# Patient Record
Sex: MALE | Race: WHITE | NOT HISPANIC OR LATINO
[De-identification: names, ages, dates, MRNs, and addresses within clinical notes are randomized per-mention and may not be internally consistent; named-entity substitution may affect disease eponyms.]

---

## 2017-12-12 PROBLEM — Z00.00 ENCOUNTER FOR PREVENTIVE HEALTH EXAMINATION: Status: ACTIVE | Noted: 2017-12-12

## 2017-12-18 ENCOUNTER — APPOINTMENT (OUTPATIENT)
Dept: SURGICAL ONCOLOGY | Facility: CLINIC | Age: 82
End: 2017-12-18
Payer: MEDICARE

## 2017-12-18 VITALS
HEIGHT: 63 IN | WEIGHT: 143 LBS | SYSTOLIC BLOOD PRESSURE: 148 MMHG | HEART RATE: 74 BPM | DIASTOLIC BLOOD PRESSURE: 70 MMHG | BODY MASS INDEX: 25.34 KG/M2 | TEMPERATURE: 97.8 F

## 2017-12-18 DIAGNOSIS — Z87.438 PERSONAL HISTORY OF OTHER DISEASES OF MALE GENITAL ORGANS: ICD-10-CM

## 2017-12-18 DIAGNOSIS — Z87.448 PERSONAL HISTORY OF OTHER DISEASES OF URINARY SYSTEM: ICD-10-CM

## 2017-12-18 DIAGNOSIS — N40.0 BENIGN PROSTATIC HYPERPLASIA WITHOUT LOWER URINARY TRACT SYMPMS: ICD-10-CM

## 2017-12-18 DIAGNOSIS — Z78.9 OTHER SPECIFIED HEALTH STATUS: ICD-10-CM

## 2017-12-18 DIAGNOSIS — Z99.2 DEPENDENCE ON RENAL DIALYSIS: ICD-10-CM

## 2017-12-18 PROCEDURE — 99204 OFFICE O/P NEW MOD 45 MIN: CPT

## 2018-01-03 ENCOUNTER — RESULT REVIEW (OUTPATIENT)
Age: 83
End: 2018-01-03

## 2018-02-13 ENCOUNTER — APPOINTMENT (OUTPATIENT)
Dept: NUCLEAR MEDICINE | Facility: HOSPITAL | Age: 83
End: 2018-02-13

## 2018-05-21 ENCOUNTER — FORM ENCOUNTER (OUTPATIENT)
Age: 83
End: 2018-05-21

## 2018-05-22 ENCOUNTER — APPOINTMENT (OUTPATIENT)
Dept: CT IMAGING | Facility: IMAGING CENTER | Age: 83
End: 2018-05-22
Payer: MEDICARE

## 2018-05-22 ENCOUNTER — OUTPATIENT (OUTPATIENT)
Dept: OUTPATIENT SERVICES | Facility: HOSPITAL | Age: 83
LOS: 1 days | End: 2018-05-22
Payer: MEDICARE

## 2018-05-22 DIAGNOSIS — C43.61 MALIGNANT MELANOMA OF RIGHT UPPER LIMB, INCLUDING SHOULDER: ICD-10-CM

## 2018-05-22 PROCEDURE — 71250 CT THORAX DX C-: CPT | Mod: 26

## 2018-05-22 PROCEDURE — 71250 CT THORAX DX C-: CPT

## 2018-06-07 ENCOUNTER — INPATIENT (INPATIENT)
Facility: HOSPITAL | Age: 83
LOS: 3 days | Discharge: HOME | DRG: 246 | End: 2018-06-11
Attending: THORACIC SURGERY (CARDIOTHORACIC VASCULAR SURGERY) | Admitting: THORACIC SURGERY (CARDIOTHORACIC VASCULAR SURGERY)
Payer: MEDICARE

## 2018-06-07 VITALS
SYSTOLIC BLOOD PRESSURE: 136 MMHG | WEIGHT: 140.88 LBS | OXYGEN SATURATION: 99 % | DIASTOLIC BLOOD PRESSURE: 79 MMHG | HEART RATE: 76 BPM | RESPIRATION RATE: 18 BRPM | TEMPERATURE: 98 F

## 2018-06-07 DIAGNOSIS — N18.6 END STAGE RENAL DISEASE: ICD-10-CM

## 2018-06-07 DIAGNOSIS — N30.90 CYSTITIS, UNSPECIFIED WITHOUT HEMATURIA: ICD-10-CM

## 2018-06-07 DIAGNOSIS — I25.10 ATHEROSCLEROTIC HEART DISEASE OF NATIVE CORONARY ARTERY WITHOUT ANGINA PECTORIS: ICD-10-CM

## 2018-06-07 DIAGNOSIS — I77.0 ARTERIOVENOUS FISTULA, ACQUIRED: Chronic | ICD-10-CM

## 2018-06-07 LAB
ALBUMIN SERPL ELPH-MCNC: 4 G/DL — SIGNIFICANT CHANGE UP (ref 3.3–5)
ALP SERPL-CCNC: 58 U/L — SIGNIFICANT CHANGE UP (ref 40–120)
ALT FLD-CCNC: 24 U/L — SIGNIFICANT CHANGE UP (ref 10–45)
ANION GAP SERPL CALC-SCNC: 13 MMOL/L — SIGNIFICANT CHANGE UP (ref 5–17)
APPEARANCE UR: CLEAR — SIGNIFICANT CHANGE UP
APTT BLD: 26.5 SEC — LOW (ref 27.5–37.4)
AST SERPL-CCNC: 27 U/L — SIGNIFICANT CHANGE UP (ref 10–40)
BASOPHILS # BLD AUTO: 0.1 K/UL — SIGNIFICANT CHANGE UP (ref 0–0.2)
BASOPHILS NFR BLD AUTO: 1.4 % — SIGNIFICANT CHANGE UP (ref 0–2)
BILIRUB SERPL-MCNC: 0.4 MG/DL — SIGNIFICANT CHANGE UP (ref 0.2–1.2)
BILIRUB UR-MCNC: NEGATIVE — SIGNIFICANT CHANGE UP
BLD GP AB SCN SERPL QL: NEGATIVE — SIGNIFICANT CHANGE UP
BUN SERPL-MCNC: 29 MG/DL — HIGH (ref 7–23)
CALCIUM SERPL-MCNC: 9.5 MG/DL — SIGNIFICANT CHANGE UP (ref 8.4–10.5)
CHLORIDE SERPL-SCNC: 99 MMOL/L — SIGNIFICANT CHANGE UP (ref 96–108)
CO2 SERPL-SCNC: 27 MMOL/L — SIGNIFICANT CHANGE UP (ref 22–31)
COLOR SPEC: YELLOW — SIGNIFICANT CHANGE UP
CREAT SERPL-MCNC: 5.77 MG/DL — HIGH (ref 0.5–1.3)
DIFF PNL FLD: ABNORMAL
EOSINOPHIL # BLD AUTO: 0.4 K/UL — SIGNIFICANT CHANGE UP (ref 0–0.5)
EOSINOPHIL NFR BLD AUTO: 5.2 % — SIGNIFICANT CHANGE UP (ref 0–6)
ESTIMATED AVERAGE GLUCOSE: 97 MG/DL — SIGNIFICANT CHANGE UP (ref 68–114)
GLUCOSE SERPL-MCNC: 95 MG/DL — SIGNIFICANT CHANGE UP (ref 70–99)
GLUCOSE UR QL: 100 MG/DL
HBA1C BLD-MCNC: 5 % — SIGNIFICANT CHANGE UP (ref 4–5.6)
HBA1C BLD-MCNC: 5 % — SIGNIFICANT CHANGE UP (ref 4–5.6)
HCT VFR BLD CALC: 30 % — LOW (ref 39–50)
HGB BLD-MCNC: 10.2 G/DL — LOW (ref 13–17)
INR BLD: 0.96 RATIO — SIGNIFICANT CHANGE UP (ref 0.88–1.16)
KETONES UR-MCNC: NEGATIVE — SIGNIFICANT CHANGE UP
LEUKOCYTE ESTERASE UR-ACNC: ABNORMAL
LYMPHOCYTES # BLD AUTO: 1.7 K/UL — SIGNIFICANT CHANGE UP (ref 1–3.3)
LYMPHOCYTES # BLD AUTO: 20.4 % — SIGNIFICANT CHANGE UP (ref 13–44)
MCHC RBC-ENTMCNC: 32.2 PG — SIGNIFICANT CHANGE UP (ref 27–34)
MCHC RBC-ENTMCNC: 34 GM/DL — SIGNIFICANT CHANGE UP (ref 32–36)
MCV RBC AUTO: 94.8 FL — SIGNIFICANT CHANGE UP (ref 80–100)
MONOCYTES # BLD AUTO: 0.6 K/UL — SIGNIFICANT CHANGE UP (ref 0–0.9)
MONOCYTES NFR BLD AUTO: 7.6 % — SIGNIFICANT CHANGE UP (ref 2–14)
MRSA PCR RESULT.: SIGNIFICANT CHANGE UP
NEUTROPHILS # BLD AUTO: 5.4 K/UL — SIGNIFICANT CHANGE UP (ref 1.8–7.4)
NEUTROPHILS NFR BLD AUTO: 65.4 % — SIGNIFICANT CHANGE UP (ref 43–77)
NITRITE UR-MCNC: NEGATIVE — SIGNIFICANT CHANGE UP
NT-PROBNP SERPL-SCNC: 2583 PG/ML — HIGH (ref 0–300)
PA ADP PRP-ACNC: 290 PRU — SIGNIFICANT CHANGE UP (ref 194–417)
PH UR: >9 — HIGH (ref 5–8)
PLATELET # BLD AUTO: 174 K/UL — SIGNIFICANT CHANGE UP (ref 150–400)
POTASSIUM SERPL-MCNC: 4.3 MMOL/L — SIGNIFICANT CHANGE UP (ref 3.5–5.3)
POTASSIUM SERPL-SCNC: 4.3 MMOL/L — SIGNIFICANT CHANGE UP (ref 3.5–5.3)
PROT SERPL-MCNC: 7.1 G/DL — SIGNIFICANT CHANGE UP (ref 6–8.3)
PROT UR-MCNC: 300 MG/DL
PROTHROM AB SERPL-ACNC: 10.5 SEC — SIGNIFICANT CHANGE UP (ref 9.8–12.7)
RBC # BLD: 3.17 M/UL — LOW (ref 4.2–5.8)
RBC # FLD: 13.2 % — SIGNIFICANT CHANGE UP (ref 10.3–14.5)
RH IG SCN BLD-IMP: POSITIVE — SIGNIFICANT CHANGE UP
S AUREUS DNA NOSE QL NAA+PROBE: SIGNIFICANT CHANGE UP
SODIUM SERPL-SCNC: 139 MMOL/L — SIGNIFICANT CHANGE UP (ref 135–145)
SP GR SPEC: 1.01 — SIGNIFICANT CHANGE UP (ref 1.01–1.02)
T4 FREE SERPL-MCNC: 1.4 NG/DL — SIGNIFICANT CHANGE UP (ref 0.9–1.8)
TSH SERPL-MCNC: 2.15 UIU/ML — SIGNIFICANT CHANGE UP (ref 0.27–4.2)
UROBILINOGEN FLD QL: NEGATIVE — SIGNIFICANT CHANGE UP
WBC # BLD: 8.2 K/UL — SIGNIFICANT CHANGE UP (ref 3.8–10.5)
WBC # FLD AUTO: 8.2 K/UL — SIGNIFICANT CHANGE UP (ref 3.8–10.5)

## 2018-06-07 PROCEDURE — 71045 X-RAY EXAM CHEST 1 VIEW: CPT | Mod: 26

## 2018-06-07 PROCEDURE — 99152 MOD SED SAME PHYS/QHP 5/>YRS: CPT

## 2018-06-07 PROCEDURE — 93010 ELECTROCARDIOGRAM REPORT: CPT

## 2018-06-07 PROCEDURE — 93460 R&L HRT ART/VENTRICLE ANGIO: CPT | Mod: 26

## 2018-06-07 PROCEDURE — 93306 TTE W/DOPPLER COMPLETE: CPT | Mod: 26

## 2018-06-07 PROCEDURE — 99222 1ST HOSP IP/OBS MODERATE 55: CPT

## 2018-06-07 RX ORDER — ATORVASTATIN CALCIUM 80 MG/1
40 TABLET, FILM COATED ORAL AT BEDTIME
Qty: 0 | Refills: 0 | Status: DISCONTINUED | OUTPATIENT
Start: 2018-06-07 | End: 2018-06-11

## 2018-06-07 RX ORDER — ASPIRIN/CALCIUM CARB/MAGNESIUM 324 MG
81 TABLET ORAL DAILY
Qty: 0 | Refills: 0 | Status: DISCONTINUED | OUTPATIENT
Start: 2018-06-07 | End: 2018-06-11

## 2018-06-07 RX ORDER — CEFUROXIME AXETIL 250 MG
1500 TABLET ORAL ONCE
Qty: 0 | Refills: 0 | Status: DISCONTINUED | OUTPATIENT
Start: 2018-06-07 | End: 2018-06-07

## 2018-06-07 RX ORDER — CHLORHEXIDINE GLUCONATE 213 G/1000ML
1 SOLUTION TOPICAL
Qty: 0 | Refills: 0 | Status: DISCONTINUED | OUTPATIENT
Start: 2018-06-07 | End: 2018-06-07

## 2018-06-07 RX ORDER — AMLODIPINE BESYLATE 2.5 MG/1
10 TABLET ORAL DAILY
Qty: 0 | Refills: 0 | Status: DISCONTINUED | OUTPATIENT
Start: 2018-06-07 | End: 2018-06-07

## 2018-06-07 RX ORDER — AMLODIPINE BESYLATE 2.5 MG/1
10 TABLET ORAL DAILY
Qty: 0 | Refills: 0 | Status: DISCONTINUED | OUTPATIENT
Start: 2018-06-07 | End: 2018-06-11

## 2018-06-07 RX ORDER — LATANOPROST 0.05 MG/ML
1 SOLUTION/ DROPS OPHTHALMIC; TOPICAL AT BEDTIME
Qty: 0 | Refills: 0 | Status: DISCONTINUED | OUTPATIENT
Start: 2018-06-07 | End: 2018-06-11

## 2018-06-07 RX ORDER — FINASTERIDE 5 MG/1
5 TABLET, FILM COATED ORAL DAILY
Qty: 0 | Refills: 0 | Status: DISCONTINUED | OUTPATIENT
Start: 2018-06-07 | End: 2018-06-11

## 2018-06-07 RX ORDER — HEPARIN SODIUM 5000 [USP'U]/ML
5000 INJECTION INTRAVENOUS; SUBCUTANEOUS EVERY 8 HOURS
Qty: 0 | Refills: 0 | Status: DISCONTINUED | OUTPATIENT
Start: 2018-06-07 | End: 2018-06-11

## 2018-06-07 RX ORDER — TAMSULOSIN HYDROCHLORIDE 0.4 MG/1
0.4 CAPSULE ORAL AT BEDTIME
Qty: 0 | Refills: 0 | Status: DISCONTINUED | OUTPATIENT
Start: 2018-06-07 | End: 2018-06-11

## 2018-06-07 RX ORDER — CEFTRIAXONE 500 MG/1
1 INJECTION, POWDER, FOR SOLUTION INTRAMUSCULAR; INTRAVENOUS ONCE
Qty: 0 | Refills: 0 | Status: COMPLETED | OUTPATIENT
Start: 2018-06-07 | End: 2018-06-07

## 2018-06-07 RX ORDER — CHLORHEXIDINE GLUCONATE 213 G/1000ML
15 SOLUTION TOPICAL
Qty: 0 | Refills: 0 | Status: DISCONTINUED | OUTPATIENT
Start: 2018-06-07 | End: 2018-06-07

## 2018-06-07 RX ORDER — METOPROLOL TARTRATE 50 MG
25 TABLET ORAL
Qty: 0 | Refills: 0 | Status: DISCONTINUED | OUTPATIENT
Start: 2018-06-07 | End: 2018-06-11

## 2018-06-07 RX ORDER — SODIUM CHLORIDE 9 MG/ML
3 INJECTION INTRAMUSCULAR; INTRAVENOUS; SUBCUTANEOUS EVERY 8 HOURS
Qty: 0 | Refills: 0 | Status: DISCONTINUED | OUTPATIENT
Start: 2018-06-07 | End: 2018-06-11

## 2018-06-07 RX ADMIN — HEPARIN SODIUM 5000 UNIT(S): 5000 INJECTION INTRAVENOUS; SUBCUTANEOUS at 06:26

## 2018-06-07 RX ADMIN — LATANOPROST 1 DROP(S): 0.05 SOLUTION/ DROPS OPHTHALMIC; TOPICAL at 21:41

## 2018-06-07 RX ADMIN — TAMSULOSIN HYDROCHLORIDE 0.4 MILLIGRAM(S): 0.4 CAPSULE ORAL at 21:41

## 2018-06-07 RX ADMIN — Medication 25 MILLIGRAM(S): at 06:14

## 2018-06-07 RX ADMIN — AMLODIPINE BESYLATE 10 MILLIGRAM(S): 2.5 TABLET ORAL at 06:14

## 2018-06-07 RX ADMIN — FINASTERIDE 5 MILLIGRAM(S): 5 TABLET, FILM COATED ORAL at 11:20

## 2018-06-07 RX ADMIN — SODIUM CHLORIDE 3 MILLILITER(S): 9 INJECTION INTRAMUSCULAR; INTRAVENOUS; SUBCUTANEOUS at 05:43

## 2018-06-07 RX ADMIN — SODIUM CHLORIDE 3 MILLILITER(S): 9 INJECTION INTRAMUSCULAR; INTRAVENOUS; SUBCUTANEOUS at 21:38

## 2018-06-07 RX ADMIN — CEFTRIAXONE 100 GRAM(S): 500 INJECTION, POWDER, FOR SOLUTION INTRAMUSCULAR; INTRAVENOUS at 11:20

## 2018-06-07 RX ADMIN — HEPARIN SODIUM 5000 UNIT(S): 5000 INJECTION INTRAVENOUS; SUBCUTANEOUS at 21:41

## 2018-06-07 RX ADMIN — ATORVASTATIN CALCIUM 40 MILLIGRAM(S): 80 TABLET, FILM COATED ORAL at 21:41

## 2018-06-07 RX ADMIN — Medication 25 MILLIGRAM(S): at 17:39

## 2018-06-07 RX ADMIN — Medication 81 MILLIGRAM(S): at 11:20

## 2018-06-07 NOTE — H&P ADULT - PROBLEM SELECTOR PLAN 1
D/w attending surgeon Dr. Garcia re: surgical management   C/w ASA, statin, BB for ACS  DVT ppx - Hep SQ, GI ppx - Pepcid   Pre op w/u: labs including p2y12 post cath, TTE, carotid duplex, type and screen, TFT's, A1c, cbc, cmp, EKG, PFT's

## 2018-06-07 NOTE — H&P ADULT - NSHPLABSRESULTS_GEN_ALL_CORE
18 coronary cath at OSH revealin-95% proximal and mid RCA stenosis, 70-80% stenosis RPL, 80% ostial and 70-80%  stenosis of mid large D1  6/3/18 TTE at OSH revealing: LV systolic function normal, normal LV wall motion, RV systolic function normal, mild MR, no effusion

## 2018-06-07 NOTE — CONSULT NOTE ADULT - SUBJECTIVE AND OBJECTIVE BOX
Patient is a 82y old  Male who presents with a chief complaint of CTS eval for CAD (07 Jun 2018 02:45)    HPI:  83 y/o M PMH CAD, ESRD on HD  - MWF via LUE AVF (last HD 6/6/18) , BPH, urinary outlet obstruction, self cath x3y. Pt presented to ED at [place], [place], c/o CP x several weeks described as sharp, heavy, radiating to L. arm and neck assoc. w/ diaphoresis and n/v, and worsened by physical exertion. In ED, found to have NSTEMI (downsloping in leads 1, aVL, V1, V2) and + troponins. Pt started on Heparin gtt, then transferred to [facility] ([place]. At [facility], cath revealing multivessel CAD; however, EKG changes not seen. At [facility], patient was referred for PCI; however, patient and family decided upon transfer and further evaluation and treatment at [facility], as patient's family lives nearby. Prior to transfer at [facility], patient was found to have bladder obstruction 2/2 BPH and probable cystitis, Urine Cx + group B strep and was placed on Amoxicilin x 5 day course. (07 Jun 2018 02:45)      PAST MEDICAL & SURGICAL HISTORY:  Cystitis  NSTEMI (non-ST elevated myocardial infarction)  CAD (coronary artery disease)  ESRD (end stage renal disease) on dialysis  BPH (benign prostatic hyperplasia)  AV fistula      Social history:    FAMILY HISTORY:    REVIEW OF SYSTEMS  General:	Denies any malaise fatigue or chills. Fevers absent    Skin:No rash  	  Ophthalmologic:Denies any visual complaints,discharge redness or photophobia  	  ENMT:No nasal discharge,headache,sinus congestion or throat pain.No dental complaints    Respiratory and Thorax:No cough,sputum or chest pain.Denies shortness of breath  	  Cardiovascular:	No chest pain,palpitaions or dizziness    Gastrointestinal:	NO nausea,abdominal pain or diarrhea.    Genitourinary:	No dysuria,frequency. No flank pain    Musculoskeletal:	No joint swelling or pain.No weakness    Neurological:No confusion,diziness.No extremity weakness.No bladder or bowel incontinence	    Psychiatric:No delusions or hallucinations	    Hematology/Lymphatics:	No LN swelling.No gum bleeding     Endocrine:	No recent weight gain or loss.No abnormal heat/cold intolerance    Allergic/Immunologic:	No hives or rash   Allergies    No Known Allergies    Intolerances        Antimicrobials:          Vital Signs Last 24 Hrs  T(C): 36.7 (07 Jun 2018 04:55), Max: 36.7 (07 Jun 2018 01:10)  T(F): 98.1 (07 Jun 2018 04:55), Max: 98.1 (07 Jun 2018 01:10)  HR: 70 (07 Jun 2018 04:55) (70 - 76)  BP: 136/62 (07 Jun 2018 04:55) (136/62 - 136/79)  BP(mean): --  RR: 17 (07 Jun 2018 04:55) (17 - 18)  SpO2: 99% (07 Jun 2018 04:55) (99% - 99%)    PHYSICAL EXAM:Pleasant patient in no acute distress.      Constitutional:Comfortable.Awake and alert  No cachexia     Eyes:PERRL EOMI.NO discharge or conjunctival injection    ENMT:No sinus tenderness.No thrush.No pharyngeal exudate or erythema.Fair dental hygiene    Neck:Supple,No LN,no JVD                Gastrointestinal:Soft BS(+) no tenderness no masses ,No rebound or guarding    Genitourinary:No CVA tendereness     Rectal:    Extremities:No cyanosis,clubbing or edema.    Vascular:peripheral pulses felt    Neurological:AAO X 3,No grossly focal deficits    Skin:No rash     Lymph Nodes:No palpable LNs    Musculoskeletal:No joint swelling or LOM    Psychiatric:Affect normal.                                10.2   8.2   )-----------( 174      ( 07 Jun 2018 06:50 )             30.0         06-07    139  |  99  |  29<H>  ----------------------------<  95  4.3   |  27  |  5.77<H>    Ca    9.5      07 Jun 2018 07:23    TPro  7.1  /  Alb  4.0  /  TBili  0.4  /  DBili  x   /  AST  27  /  ALT  24  /  AlkPhos  58  06-07      RECENT CULTURES:      MICROBIOLOGY:          Radiology:      Assessment:        Recommendations and Plan:    Pager [phone]  After 5 pm/weekends or if no response :[phone]
[facility]  [phone]  NEPHROLOGY      INITIAL CONSULT NOTE  --------------------------------------------------------------------------------  HPI:    81 y/o M PMH CAD, ESRD on HD  - MWF via LUE AVF (last HD 6/6/18) , BPH, urinary outlet obstruction, self cath x3y. Pt presented to ED at [place], [place], c/o CP x several weeks described as sharp, heavy, radiating to L. arm and neck assoc. w/ diaphoresis and n/v, and worsened by physical exertion. In ED, found to have NSTEMI (downsloping in leads 1, aVL, V1, V2) and + troponins. Pt started on Heparin gtt, then transferred to [facility] ([place] for further work up. At [facility], cath revealing multivessel CAD; however, EKG changes not seen. At [facility], patient was referred for PCI; however, patient and family decided upon transfer and further evaluation and treatment at [facility], as patient's family lives nearby. Prior to transfer at [facility], patient was found to have bladder obstruction 2/2 BPH and probable cystitis, Urine Cx + group B strep and was placed on Amoxicilin x 5 day course. pt now admitted being considered for coronary intervention       PAST HISTORY  --------------------------------------------------------------------------------  PAST MEDICAL & SURGICAL HISTORY:  Cystitis  NSTEMI (non-ST elevated myocardial infarction)  CAD (coronary artery disease)  ESRD (end stage renal disease) on dialysis  BPH (benign prostatic hyperplasia)  AV fistula    FAMILY HISTORY: no ckd     PAST SOCIAL HISTORY: past tobacco no alcohol     ALLERGIES & MEDICATIONS  --------------------------------------------------------------------------------  Allergies    No Known Allergies    Intolerances      Standing Inpatient Medications  amLODIPine   Tablet 10 milliGRAM(s) Oral daily  aspirin enteric coated 81 milliGRAM(s) Oral daily  atorvastatin 40 milliGRAM(s) Oral at bedtime  cefuroxime  IVPB 1500 milliGRAM(s) IV Intermittent once  chlorhexidine 0.12% Liquid 15 milliLiter(s) Swish and Spit two times a day  chlorhexidine 4% Liquid 1 Application(s) Topical two times a day  finasteride 5 milliGRAM(s) Oral daily  heparin  Injectable 5000 Unit(s) SubCutaneous every 8 hours  latanoprost 0.005% Ophthalmic Solution 1 Drop(s) Both EYES at bedtime  metoprolol tartrate 25 milliGRAM(s) Oral two times a day  sodium chloride 0.9% lock flush 3 milliLiter(s) IV Push every 8 hours  tamsulosin 0.4 milliGRAM(s) Oral at bedtime    PRN Inpatient Medications      REVIEW OF SYSTEMS  --------------------------------------------------------------------------------  Gen: No  fevers/chills   Skin: No rashes  Head/Eyes/Ears/Mouth: No headache; Normal hearing;  No sinus pain/discomfort, sore throat  Respiratory: No dyspnea, cough, wheezing, hemoptysis  CV: No chest pain, orthopnea now   GI: No abdominal pain, diarrhea, nausea, vomiting, melena, hematochezia  : No dysuria, decrease urination or hesitancy urinating  hematuria, nocturia self cath  MSK: No joint pain/swelling; no back pain  Neuro: No dizziness/lightheadedness  also with no edema     All other systems were reviewed and are negative, except as noted.    VITALS/PHYSICAL EXAM  --------------------------------------------------------------------------------  T(C): 36.8 (06-07-18 @ 18:00), Max: 36.9 (06-07-18 @ 17:29)  HR: 71 (06-07-18 @ 18:00) (70 - 76)  BP: 130/63 (06-07-18 @ 18:00) (130/63 - 146/53)  RR: 18 (06-07-18 @ 18:00) (17 - 18)  SpO2: 96% (06-07-18 @ 18:00) (96% - 99%)  Wt(kg): --    Weight (kg): 63.9 (06-07-18 @ 01:10)      06-06-18 @ 07:01  -  06-07-18 @ 07:00  --------------------------------------------------------  IN: 150 mL / OUT: 400 mL / NET: -250 mL    06-07-18 @ 07:01  -  06-07-18 @ 18:55  --------------------------------------------------------  IN: 480 mL / OUT: 400 mL / NET: 80 mL      Physical Exam:  	Gen: Non toxic comfortable appearing   	no jvd , supple neck,   	Pulm: decrease bs  no rales or ronchi or wheezing  	CV: RRR, S1S2; no rub  	Back: No CVA tenderness; no sacral edema  	Abd: +BS, soft, nontender/nondistended  	: No suprapubic tenderness  	UE: Warm, no cyanosis  no clubbing,  no edema; no asterixis  	LE: Warm, no cyanosis  no clubbing, no edema  	Neuro: alert and oriented. speech coherent   	Psych: Normal affect and mood  	Skin: Warm, no decrease skin turgor   	Vascular access:    LABS/STUDIES  --------------------------------------------------------------------------------              10.2   8.2   >-----------<  174      [06-07-18 @ 06:50]              30.0     139  |  99  |  29  ----------------------------<  95      [06-07-18 @ 07:23]  4.3   |  27  |  5.77        Ca     9.5     [06-07-18 @ 07:23]    TPro  7.1  /  Alb  4.0  /  TBili  0.4  /  DBili  x   /  AST  27  /  ALT  24  /  AlkPhos  58  [06-07-18 @ 07:23]    PT/INR: PT 10.5 , INR 0.96       [06-07-18 @ 06:50]  PTT: 26.5       [06-07-18 @ 06:50]      Creatinine Trend:  SCr 5.77 [06-07 @ 07:23]    Urinalysis - [06-07-18 @ 07:23]      Color Yellow / Appearance Clear / SG 1.012 / pH >9.0      Gluc 100 / Ketone Negative  / Bili Negative / Urobili Negative       Blood Small / Protein 300 / Leuk Est Moderate / Nitrite Negative      RBC 5-10 / WBC >50 / Hyaline  / Gran  / Sq Epi  / Non Sq Epi Few / Bacteria       HbA1c 5.0      [06-07-18 @ 11:06]  TSH 2.15      [06-07-18 @ 08:03]

## 2018-06-07 NOTE — H&P ADULT - PMH
BPH (benign prostatic hyperplasia)    CAD (coronary artery disease)    Cystitis    ESRD (end stage renal disease) on dialysis    NSTEMI (non-ST elevated myocardial infarction)

## 2018-06-07 NOTE — PROGRESS NOTE ADULT - PROBLEM SELECTOR PLAN 1
Repeat cath today with Dr Simmons  NPO after midnight  ck am labs  Abx on all to OR  T& C  CABG in am with Dr Garcia

## 2018-06-07 NOTE — CONSULT NOTE ADULT - ASSESSMENT
82 yr old with BPH, CRI, admitted for CABG. Prior to transfer had positive urine culture for group B stret.  Received 5 days of po ampicillin or amoxicillin     Desies any fever, chills sweats, malaise  has villareal     Reasonable to give one dose of ceftriaxone today  and apt [facility] get routine antibiotic prophylaxis with surgery in am.
81 y/o M PMH CAD, ESRD on HD  - MWF via LUE AVF (last HD 6/6/18) , BPH, urinary outlet obstruction, self cath x3y. Pt presented to ED at [place], [place], c/o CP found to have NSTEMI  At [facility], cath revealing multivessel CAD; however, EKG changes not seen. At [facility], patient was referred for PCI; however, patient and family decided upon transfer and further evaluation and treatment at [facility], awas found to have bladder obstruction 2/2 BPH and probable cystitis, Urine Cx + group B strep and was placed on Amoxicilin x 5 day course. now coronary angiogram    1- esrd  2- cad  3- htn  4- anemia    last hd yesterday therefore next hd in am consent obtained witnessed and in chart  for coronary angio  hold norvasc in am   to have hep serologies  epogen with hd   d/w cts when seen

## 2018-06-07 NOTE — H&P ADULT - HISTORY OF PRESENT ILLNESS
81 y/o M PMH CAD, ESRD on HD  - MWF via LUE AVF (last HD 6/6/18) , BPH, urinary outlet obstruction, self cath x3y. Pt presented to ED at [place], [place], c/o CP x several weeks described as sharp, heavy, radiating to L. arm and neck assoc. w/ diaphoresis and n/v, and worsened by physical exertion. In ED, found to have NSTEMI (downsloping in leads 1, aVL, V1, V2) and + troponins. Pt started on Heparin gtt, then transferred to [facility] [place]. At [facility], cath revealing multivessel CAD; however, EKG changes not seen. At [facility], patient was referred for PCI; however, patient and family decided upon transfer and further evaluation and treatment at [facility], as patient's family lives nearby. Prior to transfer at [facility], patient was found to have bladder obstruction 2/2 BPH and probable cystitis, Urine Cx + group B strep and was placed on Amoxicilin x 5 day course.

## 2018-06-07 NOTE — PROGRESS NOTE ADULT - ASSESSMENT
81 y/o male h/o ESRD/HD (M,W,F) BPH, self straight  villareal cath transferred to [facility] for surgical evaluation of CAD

## 2018-06-07 NOTE — PROGRESS NOTE ADULT - SUBJECTIVE AND OBJECTIVE BOX
Cardiac Surgery Pre-op Note:    CC: Patient is a 82y old  Male who transferred form [facility] for eval for CAD (2018 02:45)      Referring Physician:                                                                                                           Surgeon: Dr Garcia    Procedure: (Date) (Procedure) CABG    Allergies    No Known Allergies    Intolerances        HPI:  81 y/o M PMH CAD, ESRD on HD  - MWF via LUE AVF (last HD 18) , BPH, urinary outlet obstruction, self cath x3y. Pt presented to ED at [place], [place], c/o CP x several weeks described as sharp, heavy, radiating to L. arm and neck assoc. w/ diaphoresis and n/v, and worsened by physical exertion. In ED, found to have NSTEMI (downsloping in leads 1, aVL, V1, V2) and + troponins. Pt started on Heparin gtt, then transferred to [facility] [place] up. At [facility], cath revealing multivessel CAD; however, EKG changes not seen. At [facility], patient was referred for PCI; however, patient and family decided upon transfer and further evaluation and treatment at [facility], as patient's family lives nearby. Prior to transfer at [facility], patient was found to have bladder obstruction 2/2 BPH and probable cystitis, Urine Cx + group B strep and was placed on Amoxicilin x 5 day course. (2018 02:45)      PAST MEDICAL & SURGICAL HISTORY:  Cystitis  NSTEMI (non-ST elevated myocardial infarction)  CAD (coronary artery disease)  ESRD (end stage renal disease) on dialysis  BPH (benign prostatic hyperplasia)  AV fistula      MEDICATIONS  (STANDING):  amLODIPine   Tablet 10 milliGRAM(s) Oral daily  aspirin enteric coated 81 milliGRAM(s) Oral daily  atorvastatin 40 milliGRAM(s) Oral at bedtime  cefuroxime  IVPB 1500 milliGRAM(s) IV Intermittent once  chlorhexidine 0.12% Liquid 15 milliLiter(s) Swish and Spit two times a day  chlorhexidine 4% Liquid 1 Application(s) Topical two times a day  finasteride 5 milliGRAM(s) Oral daily  heparin  Injectable 5000 Unit(s) SubCutaneous every 8 hours  latanoprost 0.005% Ophthalmic Solution 1 Drop(s) Both EYES at bedtime  metoprolol tartrate 25 milliGRAM(s) Oral two times a day  sodium chloride 0.9% lock flush 3 milliLiter(s) IV Push every 8 hours  tamsulosin 0.4 milliGRAM(s) Oral at bedtime    MEDICATIONS  (PRN):        Labs:                        10.2   8.2   )-----------( 174      ( 2018 06:50 )             30.0         139  |  99  |  29<H>  ----------------------------<  95  4.3   |  27  |  5.77<H>    Ca    9.5      2018 07:23    TPro  7.1  /  Alb  4.0  /  TBili  0.4  /  DBili  x   /  AST  27  /  ALT  24  /  AlkPhos  58      PT/INR - ( 2018 06:50 )   PT: 10.5 sec;   INR: 0.96 ratio         PTT - ( 2018 06:50 )  PTT:26.5 sec    Blood Type: ABO Interpretation: B ( @ 07:04)    HGB A1C: Hemoglobin A1C, Whole Blood: 5.0 % ( @ 11:06)    Prealbumin:   Pro-BNP: Serum Pro-Brain Natriuretic Peptide: 2583 pg/mL ( @ 07:23)    Thyroid Panel:  @ 08:03/2.15  1.4/--/--    MRSA:  / MSSA:   Urinalysis Basic - ( 2018 07:23 )    Color: Yellow / Appearance: Clear / S.012 / pH: x  Gluc: x / Ketone: Negative  / Bili: Negative / Urobili: Negative   Blood: x / Protein: 300 mg/dL / Nitrite: Negative   Leuk Esterase: Moderate / RBC: 5-10 /HPF / WBC >50 /HPF   Sq Epi: x / Non Sq Epi: Few /HPF / Bacteria: x        CXR: < from: Xray Chest 1 View AP/PA (18 @ 06:03) >  The heart is normal in size. The lungs are clear. Degenerative changes of   the thoracic spine.    < end of copied text >      EKG:     Carotid Duplex:      PFT's:    Echocardiogram: pending    Cardiac catheterization: pending repeat    Vein Mapping:    Gen: WN/WD NAD  Neuro: AAOx3, nonfocal  Pulm: CTA B/L  CV: RRR, S1S2  Abd: Soft, NT, ND +BS  Ext: No edema, + peripheral pulses      Pt has AICD/PPM [ ] Yes  [ x] No             Brand Name:  Pre-op Beta Blocker ordered within 24 hrs of surgery (CABG ONLY)?  [ ] Yes  [ ] No  If not, Why?  Type & Cross  [x ] Yes  [ ] No  NPO after Midnight [x ] Yes  [ ] No  Pre-op ABX ordered, to be taped on chart:  [x ] Yes  [ ] No     Hibiclens/Peridex ordered [x ] Yes  [ ] No  Intraop on Hold: PRBCs, CXR, LINCOLN [x ]   Consent obtained  [ x] Yes  [ ] No

## 2018-06-07 NOTE — H&P ADULT - NSHPPHYSICALEXAM_GEN_ALL_CORE
General: NAD  HEENT:  NC/AT  Neuro: A&Ox4, speech clear, no focal deficits noted  Respiratory: B/L BS CTA, no wheeze, no rhonchi, no crackles noted  Cardiovascular: RRR, normal S1S2, no murmur noted  GI: Abd soft, NT/ND, +BSx4Q +BM  Peripheral Vascular:  B/L LE neg edema, 2+ peripheral pulses, no clubbing, cyanosis, varicosities/PVD noted  +LUE AVF w/ thrill   Musculoskeletal: B/L UE and LE 5/5 strength   Psychiatric: Normal mood, normal affect observed  Skin: Normal exam to inspection and palpation w/o bleeding or hematoma

## 2018-06-07 NOTE — H&P ADULT - ASSESSMENT
81 y/o M PMH CAD, ESRD on HD  - MWF via LUE AVF (last HD 6/6/18) , BPH, urinary outlet obstruction, self cath x3y. Transferred from facility in [place] to [facility] for CTS eval after cath revealed multivessel disease

## 2018-06-08 ENCOUNTER — APPOINTMENT (OUTPATIENT)
Dept: CARDIOTHORACIC SURGERY | Facility: HOSPITAL | Age: 83
End: 2018-06-08

## 2018-06-08 LAB
ANION GAP SERPL CALC-SCNC: 16 MMOL/L — SIGNIFICANT CHANGE UP (ref 5–17)
BUN SERPL-MCNC: 40 MG/DL — HIGH (ref 7–23)
CALCIUM SERPL-MCNC: 9.5 MG/DL — SIGNIFICANT CHANGE UP (ref 8.4–10.5)
CHLORIDE SERPL-SCNC: 99 MMOL/L — SIGNIFICANT CHANGE UP (ref 96–108)
CO2 SERPL-SCNC: 24 MMOL/L — SIGNIFICANT CHANGE UP (ref 22–31)
CREAT SERPL-MCNC: 7.58 MG/DL — HIGH (ref 0.5–1.3)
GLUCOSE SERPL-MCNC: 88 MG/DL — SIGNIFICANT CHANGE UP (ref 70–99)
HCT VFR BLD CALC: 31.2 % — LOW (ref 39–50)
HGB BLD-MCNC: 10.4 G/DL — LOW (ref 13–17)
MCHC RBC-ENTMCNC: 32.2 PG — SIGNIFICANT CHANGE UP (ref 27–34)
MCHC RBC-ENTMCNC: 33.5 GM/DL — SIGNIFICANT CHANGE UP (ref 32–36)
MCV RBC AUTO: 96.2 FL — SIGNIFICANT CHANGE UP (ref 80–100)
PLATELET # BLD AUTO: 174 K/UL — SIGNIFICANT CHANGE UP (ref 150–400)
POTASSIUM SERPL-MCNC: 4.9 MMOL/L — SIGNIFICANT CHANGE UP (ref 3.5–5.3)
POTASSIUM SERPL-SCNC: 4.9 MMOL/L — SIGNIFICANT CHANGE UP (ref 3.5–5.3)
RBC # BLD: 3.24 M/UL — LOW (ref 4.2–5.8)
RBC # FLD: 13.5 % — SIGNIFICANT CHANGE UP (ref 10.3–14.5)
RH IG SCN BLD-IMP: POSITIVE — SIGNIFICANT CHANGE UP
SODIUM SERPL-SCNC: 139 MMOL/L — SIGNIFICANT CHANGE UP (ref 135–145)
WBC # BLD: 9.2 K/UL — SIGNIFICANT CHANGE UP (ref 3.8–10.5)
WBC # FLD AUTO: 9.2 K/UL — SIGNIFICANT CHANGE UP (ref 3.8–10.5)

## 2018-06-08 PROCEDURE — 92933 PRQ TRLML C ATHRC ST ANGIOP1: CPT | Mod: RC

## 2018-06-08 PROCEDURE — 93010 ELECTROCARDIOGRAM REPORT: CPT | Mod: 76

## 2018-06-08 PROCEDURE — 99152 MOD SED SAME PHYS/QHP 5/>YRS: CPT

## 2018-06-08 RX ORDER — FENTANYL CITRATE 50 UG/ML
25 INJECTION INTRAVENOUS ONCE
Qty: 0 | Refills: 0 | Status: DISCONTINUED | OUTPATIENT
Start: 2018-06-08 | End: 2018-06-08

## 2018-06-08 RX ORDER — ONDANSETRON 8 MG/1
4 TABLET, FILM COATED ORAL ONCE
Qty: 0 | Refills: 0 | Status: DISCONTINUED | OUTPATIENT
Start: 2018-06-08 | End: 2018-06-08

## 2018-06-08 RX ORDER — NITROGLYCERIN 6.5 MG
0.5 CAPSULE, EXTENDED RELEASE ORAL ONCE
Qty: 0 | Refills: 0 | Status: COMPLETED | OUTPATIENT
Start: 2018-06-08 | End: 2018-06-08

## 2018-06-08 RX ORDER — CLOPIDOGREL BISULFATE 75 MG/1
600 TABLET, FILM COATED ORAL ONCE
Qty: 0 | Refills: 0 | Status: COMPLETED | OUTPATIENT
Start: 2018-06-08 | End: 2018-06-08

## 2018-06-08 RX ORDER — CLOPIDOGREL BISULFATE 75 MG/1
75 TABLET, FILM COATED ORAL DAILY
Qty: 0 | Refills: 0 | Status: DISCONTINUED | OUTPATIENT
Start: 2018-06-09 | End: 2018-06-11

## 2018-06-08 RX ORDER — LANOLIN ALCOHOL/MO/W.PET/CERES
3 CREAM (GRAM) TOPICAL AT BEDTIME
Qty: 0 | Refills: 0 | Status: DISCONTINUED | OUTPATIENT
Start: 2018-06-08 | End: 2018-06-11

## 2018-06-08 RX ADMIN — FENTANYL CITRATE 25 MICROGRAM(S): 50 INJECTION INTRAVENOUS at 12:52

## 2018-06-08 RX ADMIN — ATORVASTATIN CALCIUM 40 MILLIGRAM(S): 80 TABLET, FILM COATED ORAL at 23:01

## 2018-06-08 RX ADMIN — LATANOPROST 1 DROP(S): 0.05 SOLUTION/ DROPS OPHTHALMIC; TOPICAL at 23:01

## 2018-06-08 RX ADMIN — FINASTERIDE 5 MILLIGRAM(S): 5 TABLET, FILM COATED ORAL at 16:49

## 2018-06-08 RX ADMIN — CLOPIDOGREL BISULFATE 600 MILLIGRAM(S): 75 TABLET, FILM COATED ORAL at 14:50

## 2018-06-08 RX ADMIN — Medication 81 MILLIGRAM(S): at 08:14

## 2018-06-08 RX ADMIN — Medication 25 MILLIGRAM(S): at 23:01

## 2018-06-08 RX ADMIN — SODIUM CHLORIDE 3 MILLILITER(S): 9 INJECTION INTRAMUSCULAR; INTRAVENOUS; SUBCUTANEOUS at 23:17

## 2018-06-08 RX ADMIN — SODIUM CHLORIDE 3 MILLILITER(S): 9 INJECTION INTRAMUSCULAR; INTRAVENOUS; SUBCUTANEOUS at 16:44

## 2018-06-08 RX ADMIN — AMLODIPINE BESYLATE 10 MILLIGRAM(S): 2.5 TABLET ORAL at 05:20

## 2018-06-08 RX ADMIN — HEPARIN SODIUM 5000 UNIT(S): 5000 INJECTION INTRAVENOUS; SUBCUTANEOUS at 05:19

## 2018-06-08 RX ADMIN — TAMSULOSIN HYDROCHLORIDE 0.4 MILLIGRAM(S): 0.4 CAPSULE ORAL at 23:16

## 2018-06-08 RX ADMIN — HEPARIN SODIUM 5000 UNIT(S): 5000 INJECTION INTRAVENOUS; SUBCUTANEOUS at 23:01

## 2018-06-08 RX ADMIN — Medication 3 MILLIGRAM(S): at 23:49

## 2018-06-08 RX ADMIN — HEPARIN SODIUM 5000 UNIT(S): 5000 INJECTION INTRAVENOUS; SUBCUTANEOUS at 16:49

## 2018-06-08 RX ADMIN — Medication 25 MILLIGRAM(S): at 05:20

## 2018-06-08 RX ADMIN — SODIUM CHLORIDE 3 MILLILITER(S): 9 INJECTION INTRAMUSCULAR; INTRAVENOUS; SUBCUTANEOUS at 05:18

## 2018-06-08 RX ADMIN — Medication 0.5 INCH(S): at 12:32

## 2018-06-08 NOTE — PROGRESS NOTE ADULT - SUBJECTIVE AND OBJECTIVE BOX
[facility]   [phone]  DIALYSIS NOTE  Chief Complaint: ESRD/Ongoing hemodialysis requirement.    24 hour events/subjective:      seen earlier. no worsening sob       ALLERGIES & MEDICATIONS  --------------------------------------------------------------------------------  Allergies    No Known Allergies    Intolerances      Standing Inpatient Medications  amLODIPine   Tablet 10 milliGRAM(s) Oral daily  aspirin enteric coated 81 milliGRAM(s) Oral daily  atorvastatin 40 milliGRAM(s) Oral at bedtime  finasteride 5 milliGRAM(s) Oral daily  heparin  Injectable 5000 Unit(s) SubCutaneous every 8 hours  latanoprost 0.005% Ophthalmic Solution 1 Drop(s) Both EYES at bedtime  metoprolol tartrate 25 milliGRAM(s) Oral two times a day  sodium chloride 0.9% lock flush 3 milliLiter(s) IV Push every 8 hours  tamsulosin 0.4 milliGRAM(s) Oral at bedtime    PRN Inpatient Medications      REVIEW OF SYSTEMS  --------------------------------------------------------------------------------  no itching or rash  no fever or chill  no cp or palp   no sob or cough   no N/V/D/ no abd pain   ext no edema no pain         VITALS/PHYSICAL EXAM  --------------------------------------------------------------------------------  T(C): 36.3 (06-08-18 @ 16:33), Max: 36.9 (06-07-18 @ 19:00)  HR: 90 (06-08-18 @ 18:30) (65 - 90)  BP: 142/72 (06-08-18 @ 18:30) (116/57 - 151/57)  RR: 18 (06-08-18 @ 18:30) (17 - 18)  SpO2: 100% (06-08-18 @ 18:30) (96% - 100%)  Wt(kg): --    Weight (kg): 63.9 (06-07-18 @ 01:10)      06-07-18 @ 07:01  -  06-08-18 @ 07:00  --------------------------------------------------------  IN: 560 mL / OUT: 950 mL / NET: -390 mL      Physical Exam:  		    	Gen: alert oriented place person and date   	Pulm: Decreased breath sounds b/l bases no rales or ronchi  	CV: RRR, S1/S2  	Abd: +BS, soft, nontender/nondistended  	Extremity: No cyanosis, no edema no clubbing  	+ avf   	    LABS/STUDIES  --------------------------------------------------------------------------------              10.4   9.2   >-----------<  174      [06-08-18 @ 06:10]              31.2     139  |  99  |  40  ----------------------------<  88      [06-08-18 @ 06:10]  4.9   |  24  |  7.58        Ca     9.5     [06-08-18 @ 06:10]    TPro  7.1  /  Alb  4.0  /  TBili  0.4  /  DBili  x   /  AST  27  /  ALT  24  /  AlkPhos  58  [06-07-18 @ 07:23]    PT/INR: PT 10.5 , INR 0.96       [06-07-18 @ 06:50]  PTT: 26.5       [06-07-18 @ 06:50]              imp/suggest: ESRD      Hemodialysis Prescription:  	Access: avf  	Dialyzer: revaclear 300  	Blood Flow (mL/Min): 400  	Dialysate Flow (mL/Min): 600  	Target UF (Liters): 1 liter - 1.5 liter   	Treatment Time:3h   	Potassium: 2k  	Calcium: 2.5  	  CESAR    Vitamin D     continue with hd   see hd flow sheet

## 2018-06-08 NOTE — CHART NOTE - NSCHARTNOTEFT_GEN_A_CORE
Removal of Femoral Sheath    Pulses in the right lower extremity are palpable.  The patient was placed in the supine position. The insertion site was identified and the sutures were removed per protocol.    The __6__ Israeli femoral sheath was then removed.   Direct pressure was applied for  ___30___ minutes.   Complications: None, VSS, Good Hemostasis.     Monitoring of the right groin and both lower extremities including neuro-vascular checks and vital signs every 15 minutes x 4, then every 30 minutes x 4, then every 1 hour was ordered.    Discharge Instruction discussed with patient: ASA, Plavix, statin, diet, activities, access site care, follow up care, reportable signs and symptoms.     Plan: continue to monitor, Continue ASA, Plavix, Statin,   discharge home in am if stable.  Pt will follow up with his/her cardiologist in 1-2weeks.

## 2018-06-09 LAB
ANION GAP SERPL CALC-SCNC: 16 MMOL/L — SIGNIFICANT CHANGE UP (ref 5–17)
BASOPHILS # BLD AUTO: 0.1 K/UL — SIGNIFICANT CHANGE UP (ref 0–0.2)
BASOPHILS NFR BLD AUTO: 0.7 % — SIGNIFICANT CHANGE UP (ref 0–2)
BUN SERPL-MCNC: 24 MG/DL — HIGH (ref 7–23)
CALCIUM SERPL-MCNC: 9.8 MG/DL — SIGNIFICANT CHANGE UP (ref 8.4–10.5)
CHLORIDE SERPL-SCNC: 95 MMOL/L — LOW (ref 96–108)
CO2 SERPL-SCNC: 28 MMOL/L — SIGNIFICANT CHANGE UP (ref 22–31)
CREAT SERPL-MCNC: 5.25 MG/DL — HIGH (ref 0.5–1.3)
EOSINOPHIL # BLD AUTO: 0.4 K/UL — SIGNIFICANT CHANGE UP (ref 0–0.5)
EOSINOPHIL NFR BLD AUTO: 3.6 % — SIGNIFICANT CHANGE UP (ref 0–6)
GLUCOSE SERPL-MCNC: 100 MG/DL — HIGH (ref 70–99)
HAV IGM SER-ACNC: SIGNIFICANT CHANGE UP
HBV CORE AB SER-ACNC: SIGNIFICANT CHANGE UP
HBV CORE IGM SER-ACNC: SIGNIFICANT CHANGE UP
HBV SURFACE AB SER-ACNC: <3 MIU/ML — LOW
HBV SURFACE AG SER-ACNC: SIGNIFICANT CHANGE UP
HCT VFR BLD CALC: 29.7 % — LOW (ref 39–50)
HCV AB S/CO SERPL IA: 0.2 S/CO — SIGNIFICANT CHANGE UP
HCV AB SERPL-IMP: SIGNIFICANT CHANGE UP
HGB BLD-MCNC: 10.2 G/DL — LOW (ref 13–17)
LYMPHOCYTES # BLD AUTO: 17.1 % — SIGNIFICANT CHANGE UP (ref 13–44)
LYMPHOCYTES # BLD AUTO: 2 K/UL — SIGNIFICANT CHANGE UP (ref 1–3.3)
MCHC RBC-ENTMCNC: 32.8 PG — SIGNIFICANT CHANGE UP (ref 27–34)
MCHC RBC-ENTMCNC: 34.3 GM/DL — SIGNIFICANT CHANGE UP (ref 32–36)
MCV RBC AUTO: 95.5 FL — SIGNIFICANT CHANGE UP (ref 80–100)
MONOCYTES # BLD AUTO: 0.8 K/UL — SIGNIFICANT CHANGE UP (ref 0–0.9)
MONOCYTES NFR BLD AUTO: 6.9 % — SIGNIFICANT CHANGE UP (ref 2–14)
NEUTROPHILS # BLD AUTO: 8.2 K/UL — HIGH (ref 1.8–7.4)
NEUTROPHILS NFR BLD AUTO: 71.8 % — SIGNIFICANT CHANGE UP (ref 43–77)
PLATELET # BLD AUTO: 194 K/UL — SIGNIFICANT CHANGE UP (ref 150–400)
POTASSIUM SERPL-MCNC: 3.6 MMOL/L — SIGNIFICANT CHANGE UP (ref 3.5–5.3)
POTASSIUM SERPL-SCNC: 3.6 MMOL/L — SIGNIFICANT CHANGE UP (ref 3.5–5.3)
RBC # BLD: 3.11 M/UL — LOW (ref 4.2–5.8)
RBC # FLD: 13.7 % — SIGNIFICANT CHANGE UP (ref 10.3–14.5)
SODIUM SERPL-SCNC: 139 MMOL/L — SIGNIFICANT CHANGE UP (ref 135–145)
WBC # BLD: 11.5 K/UL — HIGH (ref 3.8–10.5)
WBC # FLD AUTO: 11.5 K/UL — HIGH (ref 3.8–10.5)

## 2018-06-09 PROCEDURE — 99231 SBSQ HOSP IP/OBS SF/LOW 25: CPT

## 2018-06-09 RX ADMIN — Medication 81 MILLIGRAM(S): at 12:20

## 2018-06-09 RX ADMIN — TAMSULOSIN HYDROCHLORIDE 0.4 MILLIGRAM(S): 0.4 CAPSULE ORAL at 22:35

## 2018-06-09 RX ADMIN — AMLODIPINE BESYLATE 10 MILLIGRAM(S): 2.5 TABLET ORAL at 07:11

## 2018-06-09 RX ADMIN — Medication 25 MILLIGRAM(S): at 18:33

## 2018-06-09 RX ADMIN — LATANOPROST 1 DROP(S): 0.05 SOLUTION/ DROPS OPHTHALMIC; TOPICAL at 22:34

## 2018-06-09 RX ADMIN — SODIUM CHLORIDE 3 MILLILITER(S): 9 INJECTION INTRAMUSCULAR; INTRAVENOUS; SUBCUTANEOUS at 07:12

## 2018-06-09 RX ADMIN — HEPARIN SODIUM 5000 UNIT(S): 5000 INJECTION INTRAVENOUS; SUBCUTANEOUS at 22:34

## 2018-06-09 RX ADMIN — CLOPIDOGREL BISULFATE 75 MILLIGRAM(S): 75 TABLET, FILM COATED ORAL at 12:20

## 2018-06-09 RX ADMIN — FINASTERIDE 5 MILLIGRAM(S): 5 TABLET, FILM COATED ORAL at 12:20

## 2018-06-09 RX ADMIN — Medication 25 MILLIGRAM(S): at 07:11

## 2018-06-09 RX ADMIN — Medication 0.5 INCH(S): at 00:30

## 2018-06-09 RX ADMIN — ATORVASTATIN CALCIUM 40 MILLIGRAM(S): 80 TABLET, FILM COATED ORAL at 22:34

## 2018-06-09 RX ADMIN — SODIUM CHLORIDE 3 MILLILITER(S): 9 INJECTION INTRAMUSCULAR; INTRAVENOUS; SUBCUTANEOUS at 22:34

## 2018-06-09 RX ADMIN — SODIUM CHLORIDE 3 MILLILITER(S): 9 INJECTION INTRAMUSCULAR; INTRAVENOUS; SUBCUTANEOUS at 13:19

## 2018-06-09 NOTE — PROGRESS NOTE ADULT - ASSESSMENT
81 y/o M PMH CAD, ESRD on HD  - MWF via LUE AVF (last HD 6/6/18) , BPH, urinary outlet obstruction, self cath x3y. Pt presented to ED at [place], [place], c/o CP found to have NSTEMI  At [facility], cath revealing multivessel CAD; however, EKG changes not seen. At [facility], patient was referred for PCI; however, patient and family decided upon transfer and further evaluation and treatment at [facility], awas found to have bladder obstruction 2/2 BPH and probable cystitis, Urine Cx + group B strep and was placed on Amoxicilin x 5 day course. now coronary angiogram    1- esrd  2- cad  3- htn  4- anemia    no hd today   cont with norvasc 10 mg qd and metoprolol. change norvasc to losartan 50 mg qd   trend hb   cont porscar for obstructive uropathy hx/bph

## 2018-06-09 NOTE — PROGRESS NOTE ADULT - ASSESSMENT
83 y/o M PMH CAD, ESRD on HD  - MWF via LUE AVF (last HD 6/6/18) , BPH, urinary outlet obstruction, self cath x3y. Pt presented to ED at [place], [place], c/o CP found to have NSTEMI  At [facility], cath revealing multivessel CAD; however, EKG changes not seen. At [facility], patient was referred for PCI; however, patient and family decided upon transfer and further evaluation and treatment at [facility], awas found to have bladder obstruction 2/2 BPH and probable cystitis, Urine Cx + group B strep and was placed on Amoxicilin x 5 day course. now coronary angiogram  ID consulted for uti- pt given 1 dose of ceftriaxone 7/2  renal following for ESRD - HD MWF   no surgery as per Dr. Garcia; pt underwent PCI to RCA 6/8 6/9 VSS- + groin hematoma compressed by cardiology; h/h stable  Discharge planning - home after HD mon 6/11

## 2018-06-09 NOTE — CHART NOTE - NSCHARTNOTEFT_GEN_A_CORE
Groin check s/p PCI LAD 6/8    right groin with small hematoma 1" x 1/2 "  manually compressed- with complete resolution  +DP    Bedrest x 3 hours  can ambulate at 10 am if groin remains soft  Hold SQ heparin this am - can resume tonight if groin remains stable  CBC pending Groin check s/p PCI LAD 6/8    right groin with small hematoma 1" x 1/2 "  manually compressed- with complete resolution  +DP    Bedrest x 3 hours  can ambulate at 10 am if groin remains soft  Hold SQ heparin this am - can resume tonight if groin remains stable  CBC pending  [facility] NP notified accordingly Groin check s/p PCI LAD 6/8    right groin with small hematoma 1" x 1/2 " no bruit  manually compressed- with complete resolution  +DP    Bedrest x 3 hours  can ambulate at 10 am if groin remains soft  Hold SQ heparin this am - can resume tonight if groin remains stable  CBC pending  CVS NP notified accordingly      11:00 am    groin soft, non tender.   no hematoma or bleeding +DP  ambulating around room. no pain  continue to monitor. CBC stable  D/W CVS NP Groin check s/p PCI RCA 6/8    right groin with small hematoma 1" x 1/2 " no bruit  manually compressed- with complete resolution  +DP    Bedrest x 3 hours  can ambulate at 10 am if groin remains soft  Hold SQ heparin this am - can resume tonight if groin remains stable  CBC pending  CVS NP notified accordingly      11:00 am    groin soft, non tender.   no hematoma or bleeding +DP  ambulating around room. no pain  continue to monitor. CBC stable  D/W [facility]S NP

## 2018-06-09 NOTE — PROGRESS NOTE ADULT - SUBJECTIVE AND OBJECTIVE BOX
VITAL SIGNS    Telemetry:    Vital Signs Last 24 Hrs  T(C): 36.8 (18 @ 12:47), Max: 37.2 (18 @ 20:30)  T(F): 98.2 (18 @ 12:47), Max: 99 (18 @ 20:30)  HR: 80 (18 @ 12:47) (79 - 90)  BP: 107/76 (18 @ 12:47) (107/67 - 148/72)  RR: 19 (18 @ 12:47) (17 - 19)  SpO2: 96% (18 @ 12:47) (96% - 100%)             @ 07:01  -   @ 07:00  --------------------------------------------------------  IN: 100 mL / OUT: 600 mL / NET: -500 mL     @ 07:01  -   @ 16:11  --------------------------------------------------------  IN: 580 mL / OUT: 50 mL / NET: 530 mL       Daily     Daily Weight in k (2018 08:00)  Admit Wt: Drug Dosing Weight    Weight (kg): 63.9 (2018 01:10)      CAPILLARY BLOOD GLUCOSE              amLODIPine   Tablet 10 milliGRAM(s) Oral daily  aspirin enteric coated 81 milliGRAM(s) Oral daily  atorvastatin 40 milliGRAM(s) Oral at bedtime  clopidogrel Tablet 75 milliGRAM(s) Oral daily  finasteride 5 milliGRAM(s) Oral daily  heparin  Injectable 5000 Unit(s) SubCutaneous every 8 hours  latanoprost 0.005% Ophthalmic Solution 1 Drop(s) Both EYES at bedtime  melatonin 3 milliGRAM(s) Oral at bedtime PRN  metoprolol tartrate 25 milliGRAM(s) Oral two times a day  sodium chloride 0.9% lock flush 3 milliLiter(s) IV Push every 8 hours  tamsulosin 0.4 milliGRAM(s) Oral at bedtime      PHYSICAL EXAM    Subjective: "Hi.   Neurology: alert and oriented x 3, nonfocal, no gross deficits  CV : tele:  RSR  Sternal Wound :  CDI with dressing , Stable  Lungs: clear. RR easy, unlabored   Abdomen: soft, nontender, nondistended, positive bowel sounds, bowel movement   Neg N/V/D   :  pt voiding without difficulty   Extremities:   ANDRES; edema, neg calf tenderness.   PPP bilaterally      PW:  Chest tubes: VITAL SIGNS    Telemetry: rsr 70-80    Vital Signs Last 24 Hrs  T(C): 36.8 (18 @ 12:47), Max: 37.2 (18 @ 20:30)  T(F): 98.2 (18 @ 12:47), Max: 99 (18 @ 20:30)  HR: 80 (18 @ 12:47) (79 - 90)  BP: 107/76 (18 @ 12:47) (107/67 - 148/72)  RR: 19 (18 @ 12:47) (17 - 19)  SpO2: 96% (18 @ 12:47) (96% - 100%)             @ 07:01  -   @ 07:00  --------------------------------------------------------  IN: 100 mL / OUT: 600 mL / NET: -500 mL     @ 07:01  -   @ 16:11  --------------------------------------------------------  IN: 580 mL / OUT: 50 mL / NET: 530 mL       Daily     Daily Weight in k (2018 08:00)  Admit Wt: Drug Dosing Weight    Weight (kg): 63.9 (2018 01:10)      CAPILLARY BLOOD GLUCOSE              amLODIPine   Tablet 10 milliGRAM(s) Oral daily  aspirin enteric coated 81 milliGRAM(s) Oral daily  atorvastatin 40 milliGRAM(s) Oral at bedtime  clopidogrel Tablet 75 milliGRAM(s) Oral daily  finasteride 5 milliGRAM(s) Oral daily  heparin  Injectable 5000 Unit(s) SubCutaneous every 8 hours  latanoprost 0.005% Ophthalmic Solution 1 Drop(s) Both EYES at bedtime  melatonin 3 milliGRAM(s) Oral at bedtime PRN  metoprolol tartrate 25 milliGRAM(s) Oral two times a day  sodium chloride 0.9% lock flush 3 milliLiter(s) IV Push every 8 hours  tamsulosin 0.4 milliGRAM(s) Oral at bedtime      PHYSICAL EXAM    Subjective: "no complaints."   Neurology: alert and oriented x 3, nonfocal, no gross deficits  CV : tele:  RSR 70-80   Lungs: clear. RR easy, unlabored   Abdomen: soft, nontender, nondistended, positive bowel sounds,+ bowel movement   Neg N/V/D   :  + villareal - clear, yellow urine   Extremities:   ANDRES; neg LE edema, neg calf tenderness.   PPP bilaterally; LT AVF + bruit; thrill;   right groin hematoma- ecchymotic but soft

## 2018-06-09 NOTE — PROGRESS NOTE ADULT - SUBJECTIVE AND OBJECTIVE BOX
[facility]   [phone]  RENAL FOLLOW UP NOTE  --------------------------------------------------------------------------------  Chief Complaint:    24 hour events/subjective:    seen and d/w cts team   pt with no specific c/o     PAST HISTORY  --------------------------------------------------------------------------------  No significant changes to PMH, PSH, FHx, SHx, unless otherwise noted    ALLERGIES & MEDICATIONS  --------------------------------------------------------------------------------  Allergies    No Known Allergies    Intolerances      Standing Inpatient Medications  amLODIPine   Tablet 10 milliGRAM(s) Oral daily  aspirin enteric coated 81 milliGRAM(s) Oral daily  atorvastatin 40 milliGRAM(s) Oral at bedtime  clopidogrel Tablet 75 milliGRAM(s) Oral daily  finasteride 5 milliGRAM(s) Oral daily  heparin  Injectable 5000 Unit(s) SubCutaneous every 8 hours  latanoprost 0.005% Ophthalmic Solution 1 Drop(s) Both EYES at bedtime  metoprolol tartrate 25 milliGRAM(s) Oral two times a day  sodium chloride 0.9% lock flush 3 milliLiter(s) IV Push every 8 hours  tamsulosin 0.4 milliGRAM(s) Oral at bedtime    PRN Inpatient Medications  melatonin 3 milliGRAM(s) Oral at bedtime PRN      REVIEW OF SYSTEMS  --------------------------------------------------------------------------------    Gen: denies  fevers/chills,  CVS: denies chest pain/palpitations  Resp: denies SOB/Cough  GI: Denies N/V/Abd pain  : Denies dysuria     All other systems were reviewed and are negative, except as noted.    VITALS/PHYSICAL EXAM  --------------------------------------------------------------------------------  T(C): 37.2 (06-09-18 @ 05:02), Max: 37.2 (06-08-18 @ 20:30)  HR: 79 (06-09-18 @ 05:02) (79 - 90)  BP: 110/59 (06-09-18 @ 05:02) (107/67 - 148/72)  RR: 17 (06-09-18 @ 05:02) (17 - 18)  SpO2: 98% (06-09-18 @ 05:02) (96% - 100%)  Wt(kg): --        06-08-18 @ 07:01  -  06-09-18 @ 07:00  --------------------------------------------------------  IN: 100 mL / OUT: 600 mL / NET: -500 mL    06-09-18 @ 07:01  -  06-09-18 @ 11:12  --------------------------------------------------------  IN: 240 mL / OUT: 0 mL / NET: 240 mL      Physical Exam:  	  Gen: Non toxic comfortable appearing   	no jvd , supple neck,   	Pulm: decrease bs  no rales or ronchi or wheezing  	CV: RRR, S1S2; no rub  	Abd: +BS, soft, nontender/nondistended  	: No suprapubic tenderness  	UE: Warm, no cyanosis  no clubbing,  no edema  	LE: Warm, no cyanosis  no clubbing, no edema  	Neuro: alert and oriented. speech coherent     LABS/STUDIES  --------------------------------------------------------------------------------              10.2   11.5  >-----------<  194      [06-09-18 @ 06:57]              29.7     139  |  95  |  24  ----------------------------<  100      [06-09-18 @ 06:56]  3.6   |  28  |  5.25        Ca     9.8     [06-09-18 @ 06:56]            Creatinine Trend:  SCr 5.25 [06-09 @ 06:56]  SCr 7.58 [06-08 @ 06:10]  SCr 5.77 [06-07 @ 07:23]              Urinalysis - [06-07-18 @ 07:23]      Color Yellow / Appearance Clear / SG 1.012 / pH >9.0      Gluc 100 / Ketone Negative  / Bili Negative / Urobili Negative       Blood Small / Protein 300 / Leuk Est Moderate / Nitrite Negative      RBC 5-10 / WBC >50 / Hyaline  / Gran  / Sq Epi  / Non Sq Epi Few / Bacteria       HbA1c 5.0      [06-07-18 @ 11:06]  TSH 2.15      [06-07-18 @ 08:03]

## 2018-06-10 LAB
ANION GAP SERPL CALC-SCNC: 18 MMOL/L — HIGH (ref 5–17)
BUN SERPL-MCNC: 41 MG/DL — HIGH (ref 7–23)
CALCIUM SERPL-MCNC: 9.5 MG/DL — SIGNIFICANT CHANGE UP (ref 8.4–10.5)
CHLORIDE SERPL-SCNC: 97 MMOL/L — SIGNIFICANT CHANGE UP (ref 96–108)
CO2 SERPL-SCNC: 24 MMOL/L — SIGNIFICANT CHANGE UP (ref 22–31)
CREAT SERPL-MCNC: 7.42 MG/DL — HIGH (ref 0.5–1.3)
GLUCOSE SERPL-MCNC: 90 MG/DL — SIGNIFICANT CHANGE UP (ref 70–99)
HCT VFR BLD CALC: 28.1 % — LOW (ref 39–50)
HGB BLD-MCNC: 9.7 G/DL — LOW (ref 13–17)
MCHC RBC-ENTMCNC: 33.1 PG — SIGNIFICANT CHANGE UP (ref 27–34)
MCHC RBC-ENTMCNC: 34.4 GM/DL — SIGNIFICANT CHANGE UP (ref 32–36)
MCV RBC AUTO: 96.3 FL — SIGNIFICANT CHANGE UP (ref 80–100)
PLATELET # BLD AUTO: 175 K/UL — SIGNIFICANT CHANGE UP (ref 150–400)
POTASSIUM SERPL-MCNC: 4.6 MMOL/L — SIGNIFICANT CHANGE UP (ref 3.5–5.3)
POTASSIUM SERPL-SCNC: 4.6 MMOL/L — SIGNIFICANT CHANGE UP (ref 3.5–5.3)
RBC # BLD: 2.92 M/UL — LOW (ref 4.2–5.8)
RBC # FLD: 14 % — SIGNIFICANT CHANGE UP (ref 10.3–14.5)
SODIUM SERPL-SCNC: 139 MMOL/L — SIGNIFICANT CHANGE UP (ref 135–145)
WBC # BLD: 9.6 K/UL — SIGNIFICANT CHANGE UP (ref 3.8–10.5)
WBC # FLD AUTO: 9.6 K/UL — SIGNIFICANT CHANGE UP (ref 3.8–10.5)

## 2018-06-10 RX ADMIN — CLOPIDOGREL BISULFATE 75 MILLIGRAM(S): 75 TABLET, FILM COATED ORAL at 11:55

## 2018-06-10 RX ADMIN — HEPARIN SODIUM 5000 UNIT(S): 5000 INJECTION INTRAVENOUS; SUBCUTANEOUS at 05:27

## 2018-06-10 RX ADMIN — SODIUM CHLORIDE 3 MILLILITER(S): 9 INJECTION INTRAMUSCULAR; INTRAVENOUS; SUBCUTANEOUS at 14:05

## 2018-06-10 RX ADMIN — SODIUM CHLORIDE 3 MILLILITER(S): 9 INJECTION INTRAMUSCULAR; INTRAVENOUS; SUBCUTANEOUS at 22:40

## 2018-06-10 RX ADMIN — Medication 81 MILLIGRAM(S): at 11:56

## 2018-06-10 RX ADMIN — Medication 3 MILLIGRAM(S): at 22:37

## 2018-06-10 RX ADMIN — TAMSULOSIN HYDROCHLORIDE 0.4 MILLIGRAM(S): 0.4 CAPSULE ORAL at 22:37

## 2018-06-10 RX ADMIN — SODIUM CHLORIDE 3 MILLILITER(S): 9 INJECTION INTRAMUSCULAR; INTRAVENOUS; SUBCUTANEOUS at 05:24

## 2018-06-10 RX ADMIN — FINASTERIDE 5 MILLIGRAM(S): 5 TABLET, FILM COATED ORAL at 11:55

## 2018-06-10 RX ADMIN — LATANOPROST 1 DROP(S): 0.05 SOLUTION/ DROPS OPHTHALMIC; TOPICAL at 22:37

## 2018-06-10 RX ADMIN — Medication 25 MILLIGRAM(S): at 17:13

## 2018-06-10 RX ADMIN — AMLODIPINE BESYLATE 10 MILLIGRAM(S): 2.5 TABLET ORAL at 05:27

## 2018-06-10 RX ADMIN — Medication 25 MILLIGRAM(S): at 05:27

## 2018-06-10 RX ADMIN — HEPARIN SODIUM 5000 UNIT(S): 5000 INJECTION INTRAVENOUS; SUBCUTANEOUS at 22:37

## 2018-06-10 RX ADMIN — ATORVASTATIN CALCIUM 40 MILLIGRAM(S): 80 TABLET, FILM COATED ORAL at 22:37

## 2018-06-10 NOTE — PROGRESS NOTE ADULT - SUBJECTIVE AND OBJECTIVE BOX
[facility]   [phone]  RENAL FOLLOW UP NOTE  --------------------------------------------------------------------------------  Chief Complaint:    24 hour events/subjective:    no new c/o     PAST HISTORY  --------------------------------------------------------------------------------  No significant changes to PMH, PSH, FHx, SHx, unless otherwise noted    ALLERGIES & MEDICATIONS  --------------------------------------------------------------------------------  Allergies    No Known Allergies    Intolerances      Standing Inpatient Medications  amLODIPine   Tablet 10 milliGRAM(s) Oral daily  aspirin enteric coated 81 milliGRAM(s) Oral daily  atorvastatin 40 milliGRAM(s) Oral at bedtime  clopidogrel Tablet 75 milliGRAM(s) Oral daily  finasteride 5 milliGRAM(s) Oral daily  heparin  Injectable 5000 Unit(s) SubCutaneous every 8 hours  latanoprost 0.005% Ophthalmic Solution 1 Drop(s) Both EYES at bedtime  metoprolol tartrate 25 milliGRAM(s) Oral two times a day  sodium chloride 0.9% lock flush 3 milliLiter(s) IV Push every 8 hours  tamsulosin 0.4 milliGRAM(s) Oral at bedtime    PRN Inpatient Medications  melatonin 3 milliGRAM(s) Oral at bedtime PRN      REVIEW OF SYSTEMS  --------------------------------------------------------------------------------    Gen: denies fatigue, fevers/chills,  CVS: denies chest pain/palpitations  Resp: denies SOB/Cough  GI: Denies N/V/Abd pain  : Denies dysuria    All other systems were reviewed and are negative, except as noted.    VITALS/PHYSICAL EXAM  --------------------------------------------------------------------------------  T(C): 37.1 (06-10-18 @ 05:00), Max: 37.1 (06-10-18 @ 05:00)  HR: 76 (06-10-18 @ 05:00) (72 - 80)  BP: 120/54 (06-10-18 @ 05:00) (107/76 - 134/60)  RR: 18 (06-10-18 @ 05:00) (18 - 19)  SpO2: 96% (06-10-18 @ 05:00) (95% - 96%)  Wt(kg): --        06-09-18 @ 07:01  -  06-10-18 @ 07:00  --------------------------------------------------------  IN: 920 mL / OUT: 500 mL / NET: 420 mL    06-10-18 @ 07:01  -  06-10-18 @ 11:31  --------------------------------------------------------  IN: 360 mL / OUT: 0 mL / NET: 360 mL      Physical Exam:  	    Physical Exam:  	Gen: alert oriented place person and date   	Pulm: Decreased breath sounds b/l bases. no rales or ronchi or wheezing  	CV: RRR, S1/S2. no rub  	Back: No CVA tenderness; no sacral edema  	Abd: +BS, soft, nontender/nondistended  	: No suprapubic tenderness.               Extremity: No cyanosis, no edema no clubbing + avf  	  LABS/STUDIES  --------------------------------------------------------------------------------              9.7    9.6   >-----------<  175      [06-10-18 @ 05:54]              28.1     139  |  97  |  41  ----------------------------<  90      [06-10-18 @ 05:54]  4.6   |  24  |  7.42        Ca     9.5     [06-10-18 @ 05:54]            Creatinine Trend:  SCr 7.42 [06-10 @ 05:54]  SCr 5.25 [06-09 @ 06:56]  SCr 7.58 [06-08 @ 06:10]  SCr 5.77 [06-07 @ 07:23]              Urinalysis - [06-07-18 @ 07:23]      Color Yellow / Appearance Clear / SG 1.012 / pH >9.0      Gluc 100 / Ketone Negative  / Bili Negative / Urobili Negative       Blood Small / Protein 300 / Leuk Est Moderate / Nitrite Negative      RBC 5-10 / WBC >50 / Hyaline  / Gran  / Sq Epi  / Non Sq Epi Few / Bacteria       HbA1c 5.0      [06-07-18 @ 11:06]  TSH 2.15      [06-07-18 @ 08:03]

## 2018-06-10 NOTE — PROGRESS NOTE ADULT - ASSESSMENT
83 y/o M PMH CAD, ESRD on HD  - MWF via LUE AVF (last HD 6/6/18) , BPH, urinary outlet obstruction, self cath x3y. Pt presented to ED at [place], [place], c/o CP found to have NSTEMI  At [facility], cath revealing multivessel CAD; however, EKG changes not seen. At [facility], patient was referred for PCI; however, patient and family decided upon transfer and further evaluation and treatment at [facility], awas found to have bladder obstruction 2/2 BPH and probable cystitis, Urine Cx + group B strep and was placed on Amoxicilin x 5 day course. now coronary angiogram  ID consulted for uti- pt given 1 dose of ceftriaxone 7/2  renal following for ESRD - HD MWF   no surgery as per Dr. Garcia; pt underwent PCI to RCA 6/8 6/9 VSS- + groin hematoma compressed by cardiology; h/h stable  Discharge planning - home after HD mon 6/11

## 2018-06-10 NOTE — PROGRESS NOTE ADULT - ASSESSMENT
81 y/o M PMH CAD, ESRD on HD  - MWF via LUE AVF (last HD 6/6/18) , BPH, urinary outlet obstruction, self cath x3y. Pt presented to ED at [place], [place], c/o CP found to have NSTEMI  At [facility], cath revealing multivessel CAD; however, EKG changes not seen. At [facility], patient was referred for PCI; however, patient and family decided upon transfer and further evaluation and treatment at [facility], awas found to have bladder obstruction 2/2 BPH and probable cystitis, Urine Cx + group B strep and was placed on Amoxicilin x 5 day course. now coronary angiogram    1- esrd  2- cad  3- htn  4- anemia    hd am    norvasc 10 mg qd  metoprolol to cont   trend hb epogen with hd   cont with flomax.   self catheterization and dc villareal am

## 2018-06-10 NOTE — PROGRESS NOTE ADULT - SUBJECTIVE AND OBJECTIVE BOX
CTS NP    Patient is a 82y old  Male who presents with a chief complaint of CTS eval for CAD (07 Jun 2018 02:45)      HPI:  81 y/o M PMH CAD, ESRD on HD  - MWF via LUE AVF (last HD 6/6/18) , BPH, urinary outlet obstruction, self cath x3y. Pt presented to ED at [place], [place], c/o CP x several weeks described as sharp, heavy, radiating to L. arm and neck assoc. w/ diaphoresis and n/v, and worsened by physical exertion. In ED, found to have NSTEMI (downsloping in leads 1, aVL, V1, V2) and + troponins. Pt started on Heparin gtt, then transferred to [facility] ([place]) for further work up. At [facility], cath revealing multivessel CAD; however, EKG changes not seen. At [facility], patient was referred for PCI; however, patient and family decided upon transfer and further evaluation and treatment at [facility], as patient's family lives nearby. Prior to transfer at [facility], patient was found to have bladder obstruction 2/2 BPH and probable cystitis, Urine Cx + group B strep and was placed on Amoxicilin x 5 day course. (07 Jun 2018 02:45)      PAST MEDICAL & SURGICAL HISTORY:  Cystitis  NSTEMI (non-ST elevated myocardial infarction)  CAD (coronary artery disease)  ESRD (end stage renal disease) on dialysis  BPH (benign prostatic hyperplasia)  AV fistula      MEDICATIONS  (STANDING):  amLODIPine   Tablet 10 milliGRAM(s) Oral daily  aspirin enteric coated 81 milliGRAM(s) Oral daily  atorvastatin 40 milliGRAM(s) Oral at bedtime  clopidogrel Tablet 75 milliGRAM(s) Oral daily  finasteride 5 milliGRAM(s) Oral daily  heparin  Injectable 5000 Unit(s) SubCutaneous every 8 hours  latanoprost 0.005% Ophthalmic Solution 1 Drop(s) Both EYES at bedtime  metoprolol tartrate 25 milliGRAM(s) Oral two times a day  sodium chloride 0.9% lock flush 3 milliLiter(s) IV Push every 8 hours  tamsulosin 0.4 milliGRAM(s) Oral at bedtime    MEDICATIONS  (PRN):  melatonin 3 milliGRAM(s) Oral at bedtime PRN Insomnia      Allergies    No Known Allergies    Intolerances        REVIEW OF SYSTEMS:  Negative unless otherwise stated    Vital Signs Last 24 Hrs  T(C): 37.1 (10 Gucci 2018 05:00), Max: 37.1 (10 Gucci 2018 05:00)  T(F): 98.7 (10 Gucci 2018 05:00), Max: 98.7 (10 Gucci 2018 05:00)  HR: 76 (10 Gucci 2018 05:00) (72 - 80)  BP: 120/54 (10 Gucci 2018 05:00) (107/76 - 134/60)  BP(mean): --  RR: 18 (10 Gucci 2018 05:00) (18 - 19)  SpO2: 96% (10 Gucci 2018 05:00) (95% - 96%)    PHYSICAL EXAM: Looks and feels well  Neuro: A&Ox3  Lungs: CTA bilaterally  COR: S1S2 present, no murmurs/rubs/gallops; telemetry NSR 60s-70s.  Extremities: ANDRES; no edema noted, + distal pulses; right femoral access site with ecchymosis noted, manual compression applied and area softened. No edema, no bleeding/drainage noted.  Pain: Patient denies pain and/or discomfort    LABS:                        9.7    9.6   )-----------( 175      ( 10 Gucci 2018 05:54 )             28.1     06-10    139  |  97  |  41<H>  ----------------------------<  90  4.6   |  24  |  7.42<H>    Ca    9.5      10 Gucci 2018 05:54          CAPILLARY BLOOD GLUCOSE          RADIOLOGY & ADDITIONAL TESTS:

## 2018-06-11 ENCOUNTER — TRANSCRIPTION ENCOUNTER (OUTPATIENT)
Age: 83
End: 2018-06-11

## 2018-06-11 VITALS
WEIGHT: 136.47 LBS | DIASTOLIC BLOOD PRESSURE: 73 MMHG | TEMPERATURE: 98 F | HEART RATE: 54 BPM | SYSTOLIC BLOOD PRESSURE: 101 MMHG | RESPIRATION RATE: 18 BRPM | OXYGEN SATURATION: 95 %

## 2018-06-11 LAB
ANION GAP SERPL CALC-SCNC: 17 MMOL/L — SIGNIFICANT CHANGE UP (ref 5–17)
BUN SERPL-MCNC: 54 MG/DL — HIGH (ref 7–23)
CALCIUM SERPL-MCNC: 9.4 MG/DL — SIGNIFICANT CHANGE UP (ref 8.4–10.5)
CHLORIDE SERPL-SCNC: 97 MMOL/L — SIGNIFICANT CHANGE UP (ref 96–108)
CO2 SERPL-SCNC: 23 MMOL/L — SIGNIFICANT CHANGE UP (ref 22–31)
CREAT SERPL-MCNC: 9.5 MG/DL — HIGH (ref 0.5–1.3)
GLUCOSE SERPL-MCNC: 94 MG/DL — SIGNIFICANT CHANGE UP (ref 70–99)
HCT VFR BLD CALC: 27.1 % — LOW (ref 39–50)
HGB BLD-MCNC: 9.4 G/DL — LOW (ref 13–17)
MCHC RBC-ENTMCNC: 33.7 PG — SIGNIFICANT CHANGE UP (ref 27–34)
MCHC RBC-ENTMCNC: 34.9 GM/DL — SIGNIFICANT CHANGE UP (ref 32–36)
MCV RBC AUTO: 96.6 FL — SIGNIFICANT CHANGE UP (ref 80–100)
PLATELET # BLD AUTO: 179 K/UL — SIGNIFICANT CHANGE UP (ref 150–400)
POTASSIUM SERPL-MCNC: 4.8 MMOL/L — SIGNIFICANT CHANGE UP (ref 3.5–5.3)
POTASSIUM SERPL-SCNC: 4.8 MMOL/L — SIGNIFICANT CHANGE UP (ref 3.5–5.3)
RBC # BLD: 2.8 M/UL — LOW (ref 4.2–5.8)
RBC # FLD: 14.1 % — SIGNIFICANT CHANGE UP (ref 10.3–14.5)
SODIUM SERPL-SCNC: 137 MMOL/L — SIGNIFICANT CHANGE UP (ref 135–145)
WBC # BLD: 9.3 K/UL — SIGNIFICANT CHANGE UP (ref 3.8–10.5)
WBC # FLD AUTO: 9.3 K/UL — SIGNIFICANT CHANGE UP (ref 3.8–10.5)

## 2018-06-11 PROCEDURE — C1874: CPT

## 2018-06-11 PROCEDURE — 83036 HEMOGLOBIN GLYCOSYLATED A1C: CPT

## 2018-06-11 PROCEDURE — C1817: CPT

## 2018-06-11 PROCEDURE — 85027 COMPLETE CBC AUTOMATED: CPT

## 2018-06-11 PROCEDURE — 86901 BLOOD TYPING SEROLOGIC RH(D): CPT

## 2018-06-11 PROCEDURE — 83880 ASSAY OF NATRIURETIC PEPTIDE: CPT

## 2018-06-11 PROCEDURE — 87340 HEPATITIS B SURFACE AG IA: CPT

## 2018-06-11 PROCEDURE — 80053 COMPREHEN METABOLIC PANEL: CPT

## 2018-06-11 PROCEDURE — 99152 MOD SED SAME PHYS/QHP 5/>YRS: CPT

## 2018-06-11 PROCEDURE — C1725: CPT

## 2018-06-11 PROCEDURE — 80048 BASIC METABOLIC PNL TOTAL CA: CPT

## 2018-06-11 PROCEDURE — 71045 X-RAY EXAM CHEST 1 VIEW: CPT

## 2018-06-11 PROCEDURE — 93005 ELECTROCARDIOGRAM TRACING: CPT

## 2018-06-11 PROCEDURE — 86803 HEPATITIS C AB TEST: CPT

## 2018-06-11 PROCEDURE — 86709 HEPATITIS A IGM ANTIBODY: CPT

## 2018-06-11 PROCEDURE — 85610 PROTHROMBIN TIME: CPT

## 2018-06-11 PROCEDURE — 87641 MR-STAPH DNA AMP PROBE: CPT

## 2018-06-11 PROCEDURE — 81001 URINALYSIS AUTO W/SCOPE: CPT

## 2018-06-11 PROCEDURE — 86705 HEP B CORE ANTIBODY IGM: CPT

## 2018-06-11 PROCEDURE — C1769: CPT

## 2018-06-11 PROCEDURE — 86706 HEP B SURFACE ANTIBODY: CPT

## 2018-06-11 PROCEDURE — 84439 ASSAY OF FREE THYROXINE: CPT

## 2018-06-11 PROCEDURE — 93306 TTE W/DOPPLER COMPLETE: CPT

## 2018-06-11 PROCEDURE — 99261: CPT

## 2018-06-11 PROCEDURE — 86850 RBC ANTIBODY SCREEN: CPT

## 2018-06-11 PROCEDURE — C1887: CPT

## 2018-06-11 PROCEDURE — 93460 R&L HRT ART/VENTRICLE ANGIO: CPT

## 2018-06-11 PROCEDURE — 86900 BLOOD TYPING SEROLOGIC ABO: CPT

## 2018-06-11 PROCEDURE — C9602: CPT | Mod: RC

## 2018-06-11 PROCEDURE — 87640 STAPH A DNA AMP PROBE: CPT

## 2018-06-11 PROCEDURE — 86704 HEP B CORE ANTIBODY TOTAL: CPT

## 2018-06-11 PROCEDURE — C1724: CPT

## 2018-06-11 PROCEDURE — C1894: CPT

## 2018-06-11 PROCEDURE — 85576 BLOOD PLATELET AGGREGATION: CPT

## 2018-06-11 PROCEDURE — 85730 THROMBOPLASTIN TIME PARTIAL: CPT

## 2018-06-11 PROCEDURE — 84443 ASSAY THYROID STIM HORMONE: CPT

## 2018-06-11 PROCEDURE — 99153 MOD SED SAME PHYS/QHP EA: CPT

## 2018-06-11 PROCEDURE — 99238 HOSP IP/OBS DSCHRG MGMT 30/<: CPT

## 2018-06-11 RX ORDER — CLOPIDOGREL BISULFATE 75 MG/1
1 TABLET, FILM COATED ORAL
Qty: 30 | Refills: 0 | OUTPATIENT
Start: 2018-06-11 | End: 2018-07-10

## 2018-06-11 RX ORDER — TAMSULOSIN HYDROCHLORIDE 0.4 MG/1
1 CAPSULE ORAL
Qty: 0 | Refills: 0 | COMMUNITY
Start: 2018-06-11

## 2018-06-11 RX ORDER — TAMSULOSIN HYDROCHLORIDE 0.4 MG/1
1 CAPSULE ORAL
Qty: 0 | Refills: 0 | COMMUNITY

## 2018-06-11 RX ORDER — METOPROLOL TARTRATE 50 MG
1 TABLET ORAL
Qty: 60 | Refills: 0 | OUTPATIENT
Start: 2018-06-11 | End: 2018-07-10

## 2018-06-11 RX ORDER — AMLODIPINE BESYLATE 2.5 MG/1
1 TABLET ORAL
Qty: 0 | Refills: 0 | COMMUNITY

## 2018-06-11 RX ORDER — LATANOPROST 0.05 MG/ML
1 SOLUTION/ DROPS OPHTHALMIC; TOPICAL
Qty: 0 | Refills: 0 | COMMUNITY
Start: 2018-06-11

## 2018-06-11 RX ORDER — ASPIRIN/CALCIUM CARB/MAGNESIUM 324 MG
1 TABLET ORAL
Qty: 0 | Refills: 0 | COMMUNITY
Start: 2018-06-11

## 2018-06-11 RX ORDER — ASPIRIN/CALCIUM CARB/MAGNESIUM 324 MG
1 TABLET ORAL
Qty: 0 | Refills: 0 | COMMUNITY

## 2018-06-11 RX ORDER — METOPROLOL TARTRATE 50 MG
25 TABLET ORAL
Qty: 0 | Refills: 0 | COMMUNITY

## 2018-06-11 RX ORDER — ATORVASTATIN CALCIUM 80 MG/1
1 TABLET, FILM COATED ORAL
Qty: 0 | Refills: 0 | COMMUNITY

## 2018-06-11 RX ORDER — ATORVASTATIN CALCIUM 80 MG/1
1 TABLET, FILM COATED ORAL
Qty: 0 | Refills: 0 | COMMUNITY
Start: 2018-06-11

## 2018-06-11 RX ORDER — AMLODIPINE BESYLATE 2.5 MG/1
1 TABLET ORAL
Qty: 30 | Refills: 0 | OUTPATIENT
Start: 2018-06-11 | End: 2018-07-10

## 2018-06-11 RX ORDER — LATANOPROST 0.05 MG/ML
1 SOLUTION/ DROPS OPHTHALMIC; TOPICAL
Qty: 0 | Refills: 0 | COMMUNITY

## 2018-06-11 RX ADMIN — AMLODIPINE BESYLATE 10 MILLIGRAM(S): 2.5 TABLET ORAL at 05:46

## 2018-06-11 RX ADMIN — Medication 25 MILLIGRAM(S): at 05:46

## 2018-06-11 RX ADMIN — FINASTERIDE 5 MILLIGRAM(S): 5 TABLET, FILM COATED ORAL at 13:22

## 2018-06-11 RX ADMIN — SODIUM CHLORIDE 3 MILLILITER(S): 9 INJECTION INTRAMUSCULAR; INTRAVENOUS; SUBCUTANEOUS at 05:44

## 2018-06-11 RX ADMIN — CLOPIDOGREL BISULFATE 75 MILLIGRAM(S): 75 TABLET, FILM COATED ORAL at 13:22

## 2018-06-11 RX ADMIN — HEPARIN SODIUM 5000 UNIT(S): 5000 INJECTION INTRAVENOUS; SUBCUTANEOUS at 05:48

## 2018-06-11 RX ADMIN — Medication 81 MILLIGRAM(S): at 13:22

## 2018-06-11 NOTE — DISCHARGE NOTE ADULT - CARE PROVIDER_API CALL
Guillermo Garcia), Surgery; Thoracic and Cardiac Surgery  [address] [place] [address]  Phone: [phone]  Fax: [phone]    Leanne Hodge), Cardiology; Internal Medicine; Interventional Cardiology  [facility] Dept of Cardiology  [address] [place] [address]  Phone: [phone]  Fax: [phone]

## 2018-06-11 NOTE — DISCHARGE NOTE ADULT - CARE PROVIDERS DIRECT ADDRESSES
[email or web address]

## 2018-06-11 NOTE — DISCHARGE NOTE ADULT - PLAN OF CARE
s/p pci to rca shower daily  weigh yourself daily  continue current prescriptions as ordered  increase activity as tolerated   dialysis every monday, wednesday, friday    no added salt; low fat; low cholesterol, low salt diet.; low potassium renal diet   follow up with Cardiologist in 1-2 weeks. Call to schedule appointment.  follow up with cardiac surgeon shower daily  weigh yourself daily  continue current prescriptions as ordered  increase activity as tolerated   dialysis every monday, wednesday, friday    no added salt; low fat; low cholesterol, low salt diet.; low potassium renal diet   follow up with Cardiologist, Dr. Hodge for further PCI/ Stent in 1 weeks. Call to schedule appointment. [phone]  Call Dr. Garcia's office for any questions. [phone]  diaysis every monday, wednesday and friday

## 2018-06-11 NOTE — DISCHARGE NOTE ADULT - NS AS ACTIVITY OBS
Showering allowed/Driving allowed/Walking-Indoors allowed/Walking-Outdoors allowed/Stairs allowed/Return to Work/School allowed/No Heavy lifting/straining/Sex allowed/Do not make important decisions/Do not drive or operate machinery

## 2018-06-11 NOTE — DISCHARGE NOTE ADULT - ADDITIONAL INSTRUCTIONS
follow up with Cardiologist, Dr. Hodge for further PCI/ Stent in 1 weeks. Call to schedule appointment. [phone]  Call Dr. Garcia's office for any questions. [phone]  diaysis every monday, wednesday and friday

## 2018-06-11 NOTE — DISCHARGE NOTE ADULT - CARE PLAN
Principal Discharge DX:	Coronary artery disease involving native coronary artery of native heart without angina pectoris  Goal:	s/p pci to rca  Assessment and plan of treatment:	shower daily  weigh yourself daily  continue current prescriptions as ordered  increase activity as tolerated   dialysis every monday, wednesday, friday    no added salt; low fat; low cholesterol, low salt diet.; low potassium renal diet   follow up with Cardiologist in 1-2 weeks. Call to schedule appointment.  follow up with cardiac surgeon Principal Discharge DX:	Coronary artery disease involving native coronary artery of native heart without angina pectoris  Goal:	s/p pci to rca  Assessment and plan of treatment:	shower daily  weigh yourself daily  continue current prescriptions as ordered  increase activity as tolerated   dialysis every monday, wednesday, friday    no added salt; low fat; low cholesterol, low salt diet.; low potassium renal diet   follow up with Cardiologist, Dr. Hodge for further PCI/ Stent in 1 weeks. Call to schedule appointment. [phone]  Call Dr. Garcia's office for any questions. [phone]  diaysis every monday, wednesday and friday

## 2018-06-11 NOTE — DISCHARGE NOTE ADULT - FUNCTIONAL SCREEN CURRENT LEVEL: BATHING, MLM
Patient notified that we are waiting on her labs and letting the Mag finish running in and letting the other meds start working and will not be giving any other meds at this time, till we know the results, per Dr Rico.    (0) independent

## 2018-06-11 NOTE — PROGRESS NOTE ADULT - SUBJECTIVE AND OBJECTIVE BOX
[facility]   [phone]  DIALYSIS NOTE  Chief Complaint: ESRD/Ongoing hemodialysis requirement. seen on hd    24 hour events/subjective:    no c/o at present except that he gets hd 2 hr 50 min and does not want any more time than that         ALLERGIES & MEDICATIONS  --------------------------------------------------------------------------------  Allergies    No Known Allergies    Intolerances      Standing Inpatient Medications  amLODIPine   Tablet 10 milliGRAM(s) Oral daily  aspirin enteric coated 81 milliGRAM(s) Oral daily  atorvastatin 40 milliGRAM(s) Oral at bedtime  clopidogrel Tablet 75 milliGRAM(s) Oral daily  finasteride 5 milliGRAM(s) Oral daily  heparin  Injectable 5000 Unit(s) SubCutaneous every 8 hours  latanoprost 0.005% Ophthalmic Solution 1 Drop(s) Both EYES at bedtime  metoprolol tartrate 25 milliGRAM(s) Oral two times a day  sodium chloride 0.9% lock flush 3 milliLiter(s) IV Push every 8 hours  tamsulosin 0.4 milliGRAM(s) Oral at bedtime    PRN Inpatient Medications  melatonin 3 milliGRAM(s) Oral at bedtime PRN      REVIEW OF SYSTEMS  --------------------------------------------------------------------------------  no itching or rash  no fever or chill  no cp or palp   no sob or cough   no N/V/D/ no abd pain   ext no edema        VITALS/PHYSICAL EXAM  --------------------------------------------------------------------------------  T(C): 36.5 (06-11-18 @ 10:00), Max: 37.1 (06-10-18 @ 13:50)  HR: 71 (06-11-18 @ 10:00) (71 - 90)  BP: 105/59 (06-11-18 @ 10:00) (105/59 - 125/50)  RR: 18 (06-11-18 @ 10:00) (18 - 18)  SpO2: 96% (06-11-18 @ 10:00) (96% - 97%)  Wt(kg): --        06-10-18 @ 07:01  -  06-11-18 @ 07:00  --------------------------------------------------------  IN: 700 mL / OUT: 800 mL / NET: -100 mL    06-11-18 @ 07:01  -  06-11-18 @ 12:26  --------------------------------------------------------  IN: 120 mL / OUT: 0 mL / NET: 120 mL      Physical Exam:  		    	Gen: alert oriented place person and date   	Pulm: Decreased breath sounds b/l bases no rales or ronchi  	CV: RRR, S1/S2  	Abd: +BS, soft, nontender/nondistended  	Extremity: No cyanosis, no edema no clubbing    	    LABS/STUDIES  --------------------------------------------------------------------------------              9.4    9.3   >-----------<  179      [06-11-18 @ 06:30]              27.1     137  |  97  |  54  ----------------------------<  94      [06-11-18 @ 06:30]  4.8   |  23  |  9.50        Ca     9.4     [06-11-18 @ 06:30]                    imp/suggest: ESRD      Hemodialysis Prescription:  	Access: avf  	Dialyzer: revaclear 300  	Blood Flow (mL/Min): 400  	Dialysate Flow (mL/Min): 600  	Target UF (Liters): 1 liter   	Treatment Time: 2hr 50 min   	Potassium: 2 k   	Calcium: 2.5  	  CESAR    Vitamin D     continue with hd   see hd flow sheet

## 2018-06-11 NOTE — DISCHARGE NOTE ADULT - MEDICATION SUMMARY - MEDICATIONS TO TAKE
I will START or STAY ON the medications listed below when I get home from the hospital:    dutasteride 0.5 mg oral capsule  -- 1 cap(s) by mouth once a day  -- Indication: For prostate enlargement    aspirin 81 mg oral delayed release tablet  -- 1 tab(s) by mouth once a day  -- Indication: For Blood thinner    tamsulosin 0.4 mg oral capsule  -- 1 cap(s) by mouth once a day (at bedtime)  -- Indication: For prostate enlargement    atorvastatin 40 mg oral tablet  -- 1 tab(s) by mouth once a day (at bedtime)  -- Indication: For Cholesterol    clopidogrel 75 mg oral tablet  -- 1 tab(s) by mouth once a day  -- Indication: For Blood thinner    metoprolol tartrate 25 mg oral tablet  -- 1 tab(s) by mouth 2 times a day  -- Indication: For Anti-arrythmic    amLODIPine 10 mg oral tablet  -- 1 tab(s) by mouth once a day  -- Indication: For hypertension    latanoprost 0.005% ophthalmic solution  -- 1 drop(s) to each affected eye once a day (at bedtime)  -- Indication: For Eye drops    calcium acetate 667 mg oral tablet  -- 3 tab(s) by mouth 3 times a day with each meal  -- Indication: For ESRD (end stage renal disease) on dialysis    Joelle-Arya oral tablet  -- 1 tab(s) by mouth once a day  -- Indication: For ESRD (end stage renal disease) on dialysis

## 2018-06-11 NOTE — DISCHARGE NOTE ADULT - PATIENT PORTAL LINK FT
You can access the [facility], offered by [facility], by registering with the following website: [email or web address]

## 2018-06-11 NOTE — DISCHARGE NOTE ADULT - HOSPITAL COURSE
81 y/o M PMH CAD, ESRD on HD  - MWF via LUE AVF (last HD 6/6/18) , BPH, urinary outlet obstruction, self cath x3y. Pt presented to ED at [place], [place], c/o CP found to have NSTEMI  At [facility], cath revealing multivessel CAD; however, EKG changes not seen. At [facility], patient was referred for PCI; however, patient and family decided upon transfer and further evaluation and treatment at [facility], awas found to have bladder obstruction 2/2 BPH and probable cystitis, Urine Cx + group B strep and was placed on Amoxicilin x 5 day course. now coronary angiogram  ID consulted for uti- pt given 1 dose of ceftriaxone 7/2  renal following for ESRD - HD MWF   no surgery as per Dr. Garcia; pt underwent PCI to RCA 6/8 6/9 VSS- + groin hematoma compressed by cardiology; h/h stable  Discharge planning - home today after HD mon - pt to follow up with Dr. Hodge within 1 week for for further PCI/ stent to diagnal lesion

## 2018-06-11 NOTE — DISCHARGE NOTE ADULT - HOME CARE AGENCY
[facility] - Your dialysis session is setup for 6am on Wednesday 6/13/2018. Please contact the dialysis center if you have any issues or questions

## 2018-06-11 NOTE — DISCHARGE NOTE ADULT - OTHER SIGNIFICANT FINDINGS
VSS  tele: rsr 60-90  S1S2  Lungs clear, RR easy unlabored  +bs nt nd + bm  LE: right groin cdi neg hematoma. PPP bilaterally, neg LE edema; LEFT AVF + bruit/ thrill     discharge pt home today 6/11 as per cardiology Dr. Hodge and pt to call for further PCI/ stent to diagn. lesion

## 2018-06-19 ENCOUNTER — APPOINTMENT (OUTPATIENT)
Dept: CARDIOLOGY | Facility: CLINIC | Age: 83
End: 2018-06-19
Payer: MEDICARE

## 2018-06-19 VITALS
HEIGHT: 63 IN | BODY MASS INDEX: 24.8 KG/M2 | DIASTOLIC BLOOD PRESSURE: 66 MMHG | SYSTOLIC BLOOD PRESSURE: 124 MMHG | HEART RATE: 71 BPM | WEIGHT: 140 LBS | OXYGEN SATURATION: 98 %

## 2018-06-19 DIAGNOSIS — Z87.891 PERSONAL HISTORY OF NICOTINE DEPENDENCE: ICD-10-CM

## 2018-06-19 PROCEDURE — 99215 OFFICE O/P EST HI 40 MIN: CPT

## 2018-06-19 PROCEDURE — 93000 ELECTROCARDIOGRAM COMPLETE: CPT

## 2018-06-19 RX ORDER — FOLIC ACID/VIT B COMPLEX AND C 0.8 MG
TABLET ORAL
Refills: 0 | Status: ACTIVE | COMMUNITY

## 2018-06-19 RX ORDER — ASPIRIN ENTERIC COATED TABLETS 81 MG 81 MG/1
81 TABLET, DELAYED RELEASE ORAL
Qty: 90 | Refills: 5 | Status: ACTIVE | COMMUNITY
Start: 2018-06-19

## 2018-06-19 RX ORDER — AMLODIPINE BESYLATE 10 MG/1
10 TABLET ORAL
Refills: 0 | Status: DISCONTINUED | COMMUNITY
End: 2018-06-19

## 2018-06-19 RX ORDER — CALCIUM ACETATE 667 MG/1
667 CAPSULE ORAL 3 TIMES DAILY
Refills: 0 | Status: ACTIVE | COMMUNITY

## 2018-06-20 ENCOUNTER — TRANSCRIPTION ENCOUNTER (OUTPATIENT)
Age: 83
End: 2018-06-20

## 2018-06-20 ENCOUNTER — OUTPATIENT (OUTPATIENT)
Dept: OUTPATIENT SERVICES | Facility: HOSPITAL | Age: 83
LOS: 1 days | End: 2018-06-20
Payer: MEDICARE

## 2018-06-20 VITALS
DIASTOLIC BLOOD PRESSURE: 60 MMHG | SYSTOLIC BLOOD PRESSURE: 133 MMHG | WEIGHT: 139.99 LBS | OXYGEN SATURATION: 98 % | TEMPERATURE: 98 F | RESPIRATION RATE: 18 BRPM | HEIGHT: 64 IN | HEART RATE: 69 BPM

## 2018-06-20 DIAGNOSIS — I77.0 ARTERIOVENOUS FISTULA, ACQUIRED: Chronic | ICD-10-CM

## 2018-06-20 DIAGNOSIS — I25.10 ATHEROSCLEROTIC HEART DISEASE OF NATIVE CORONARY ARTERY WITHOUT ANGINA PECTORIS: ICD-10-CM

## 2018-06-20 LAB
ALBUMIN SERPL ELPH-MCNC: 4 G/DL — SIGNIFICANT CHANGE UP (ref 3.3–5)
ALP SERPL-CCNC: 59 U/L — SIGNIFICANT CHANGE UP (ref 40–120)
ALT FLD-CCNC: 10 U/L — SIGNIFICANT CHANGE UP (ref 10–45)
ANION GAP SERPL CALC-SCNC: 19 MMOL/L — HIGH (ref 5–17)
AST SERPL-CCNC: 10 U/L — SIGNIFICANT CHANGE UP (ref 10–40)
BILIRUB SERPL-MCNC: 0.6 MG/DL — SIGNIFICANT CHANGE UP (ref 0.2–1.2)
BUN SERPL-MCNC: 52 MG/DL — HIGH (ref 7–23)
CALCIUM SERPL-MCNC: 10.3 MG/DL — SIGNIFICANT CHANGE UP (ref 8.4–10.5)
CHLORIDE SERPL-SCNC: 92 MMOL/L — LOW (ref 96–108)
CO2 SERPL-SCNC: 26 MMOL/L — SIGNIFICANT CHANGE UP (ref 22–31)
CREAT SERPL-MCNC: 8.46 MG/DL — HIGH (ref 0.5–1.3)
GLUCOSE SERPL-MCNC: 87 MG/DL — SIGNIFICANT CHANGE UP (ref 70–99)
HCT VFR BLD CALC: 32 % — LOW (ref 39–50)
HGB BLD-MCNC: 10.9 G/DL — LOW (ref 13–17)
MCHC RBC-ENTMCNC: 33.6 PG — SIGNIFICANT CHANGE UP (ref 27–34)
MCHC RBC-ENTMCNC: 34 GM/DL — SIGNIFICANT CHANGE UP (ref 32–36)
MCV RBC AUTO: 98.7 FL — SIGNIFICANT CHANGE UP (ref 80–100)
PLATELET # BLD AUTO: 269 K/UL — SIGNIFICANT CHANGE UP (ref 150–400)
POTASSIUM SERPL-MCNC: 4.8 MMOL/L — SIGNIFICANT CHANGE UP (ref 3.5–5.3)
POTASSIUM SERPL-SCNC: 4.8 MMOL/L — SIGNIFICANT CHANGE UP (ref 3.5–5.3)
PROT SERPL-MCNC: 7.5 G/DL — SIGNIFICANT CHANGE UP (ref 6–8.3)
RBC # BLD: 3.25 M/UL — LOW (ref 4.2–5.8)
RBC # FLD: 13.9 % — SIGNIFICANT CHANGE UP (ref 10.3–14.5)
SODIUM SERPL-SCNC: 137 MMOL/L — SIGNIFICANT CHANGE UP (ref 135–145)
WBC # BLD: 10.2 K/UL — SIGNIFICANT CHANGE UP (ref 3.8–10.5)
WBC # FLD AUTO: 10.2 K/UL — SIGNIFICANT CHANGE UP (ref 3.8–10.5)

## 2018-06-20 PROCEDURE — 93010 ELECTROCARDIOGRAM REPORT: CPT | Mod: 76

## 2018-06-20 RX ORDER — LATANOPROST 0.05 MG/ML
1 SOLUTION/ DROPS OPHTHALMIC; TOPICAL
Qty: 1 | Refills: 0 | OUTPATIENT
Start: 2018-06-20 | End: 2018-07-19

## 2018-06-20 RX ORDER — METOPROLOL TARTRATE 50 MG
1 TABLET ORAL
Qty: 60 | Refills: 0 | OUTPATIENT
Start: 2018-06-20 | End: 2018-07-19

## 2018-06-20 RX ORDER — CLOPIDOGREL BISULFATE 75 MG/1
1 TABLET, FILM COATED ORAL
Qty: 30 | Refills: 0 | OUTPATIENT
Start: 2018-06-20 | End: 2018-07-19

## 2018-06-20 RX ORDER — HYDRALAZINE HCL 50 MG
10 TABLET ORAL ONCE
Qty: 0 | Refills: 0 | Status: COMPLETED | OUTPATIENT
Start: 2018-06-20 | End: 2018-06-20

## 2018-06-20 RX ORDER — METOPROLOL TARTRATE 50 MG
25 TABLET ORAL
Qty: 0 | Refills: 0 | Status: DISCONTINUED | OUTPATIENT
Start: 2018-06-20 | End: 2018-06-21

## 2018-06-20 RX ORDER — CALCIUM ACETATE 667 MG
3 TABLET ORAL
Qty: 0 | Refills: 0 | COMMUNITY

## 2018-06-20 RX ORDER — HYDRALAZINE HCL 50 MG
5 TABLET ORAL ONCE
Qty: 0 | Refills: 0 | Status: COMPLETED | OUTPATIENT
Start: 2018-06-20 | End: 2018-06-20

## 2018-06-20 RX ORDER — CALCIUM ACETATE 667 MG
3 TABLET ORAL
Qty: 270 | Refills: 1 | OUTPATIENT
Start: 2018-06-20 | End: 2018-08-18

## 2018-06-20 RX ORDER — ATORVASTATIN CALCIUM 80 MG/1
1 TABLET, FILM COATED ORAL
Qty: 30 | Refills: 1 | OUTPATIENT
Start: 2018-06-20 | End: 2018-08-18

## 2018-06-20 RX ORDER — METOPROLOL TARTRATE 50 MG
1 TABLET ORAL
Qty: 60 | Refills: 1 | OUTPATIENT
Start: 2018-06-20 | End: 2018-08-18

## 2018-06-20 RX ORDER — ASPIRIN/CALCIUM CARB/MAGNESIUM 324 MG
1 TABLET ORAL
Qty: 30 | Refills: 0 | OUTPATIENT
Start: 2018-06-20 | End: 2018-07-19

## 2018-06-20 RX ORDER — ASPIRIN/CALCIUM CARB/MAGNESIUM 324 MG
1 TABLET ORAL
Qty: 30 | Refills: 1 | OUTPATIENT
Start: 2018-06-20 | End: 2018-08-18

## 2018-06-20 RX ORDER — LATANOPROST 0.05 MG/ML
1 SOLUTION/ DROPS OPHTHALMIC; TOPICAL AT BEDTIME
Qty: 0 | Refills: 0 | Status: DISCONTINUED | OUTPATIENT
Start: 2018-06-20 | End: 2018-06-21

## 2018-06-20 RX ORDER — CLOPIDOGREL BISULFATE 75 MG/1
75 TABLET, FILM COATED ORAL DAILY
Qty: 0 | Refills: 0 | Status: DISCONTINUED | OUTPATIENT
Start: 2018-06-20 | End: 2018-06-21

## 2018-06-20 RX ORDER — CALCIUM ACETATE 667 MG
2001 TABLET ORAL
Qty: 0 | Refills: 0 | Status: DISCONTINUED | OUTPATIENT
Start: 2018-06-20 | End: 2018-06-21

## 2018-06-20 RX ORDER — ASPIRIN/CALCIUM CARB/MAGNESIUM 324 MG
81 TABLET ORAL ONCE
Qty: 0 | Refills: 0 | Status: COMPLETED | OUTPATIENT
Start: 2018-06-20 | End: 2018-06-20

## 2018-06-20 RX ORDER — ATORVASTATIN CALCIUM 80 MG/1
1 TABLET, FILM COATED ORAL
Qty: 30 | Refills: 0 | OUTPATIENT
Start: 2018-06-20 | End: 2018-07-19

## 2018-06-20 RX ORDER — ASPIRIN/CALCIUM CARB/MAGNESIUM 324 MG
81 TABLET ORAL DAILY
Qty: 0 | Refills: 0 | Status: DISCONTINUED | OUTPATIENT
Start: 2018-06-21 | End: 2018-06-21

## 2018-06-20 RX ORDER — ATORVASTATIN CALCIUM 80 MG/1
40 TABLET, FILM COATED ORAL AT BEDTIME
Qty: 0 | Refills: 0 | Status: DISCONTINUED | OUTPATIENT
Start: 2018-06-20 | End: 2018-06-21

## 2018-06-20 RX ORDER — CLOPIDOGREL BISULFATE 75 MG/1
1 TABLET, FILM COATED ORAL
Qty: 90 | Refills: 3 | OUTPATIENT
Start: 2018-06-20 | End: 2019-06-14

## 2018-06-20 RX ADMIN — Medication 5 MILLIGRAM(S): at 11:45

## 2018-06-20 RX ADMIN — Medication 25 MILLIGRAM(S): at 22:03

## 2018-06-20 RX ADMIN — LATANOPROST 1 DROP(S): 0.05 SOLUTION/ DROPS OPHTHALMIC; TOPICAL at 22:04

## 2018-06-20 RX ADMIN — Medication 10 MILLIGRAM(S): at 13:39

## 2018-06-20 RX ADMIN — ATORVASTATIN CALCIUM 40 MILLIGRAM(S): 80 TABLET, FILM COATED ORAL at 22:02

## 2018-06-20 RX ADMIN — Medication 2001 MILLIGRAM(S): at 18:32

## 2018-06-20 RX ADMIN — Medication 1 TABLET(S): at 17:43

## 2018-06-20 NOTE — DISCHARGE NOTE ADULT - HOSPITAL COURSE
81 y/o M PMH CAD-Recent Stent to RCA in 6/8/18, ESRD on HD - MWF via LUE AVF (last HD 6/18/18 @ [facility]) , BPH, urinary outlet obstruction, Self Cath x 3yrs . Pt presented to ED in 6/18 at [place], [place], c/o CP found to have NSTEMI and at [facility] pt had Cardiac Cath revealing multivessel CAD. Patient was referred for PCI vs CT Surgery; however, patient and family decided to transfer for further evaluation and treatment at [facility]. During last hospitalization  pt was  found to have bladder obstruction 2/2 BPH and probable cystitis, Urine Cx + group B strep and was placed on Amoxicilin x 5 day course. Pt was seen by CT surgery Dr. Garcia and recommended PCI and pt underwent PCI to RCA 6/8. Pt presents today for further PCI/ stent to diagnal lesion. Pt is now s/p cardiac cath ALBERT x 1 1st diagonal .  Pt tolerated the procedure well, cardiac cath  Renal-Dr Steinberg 83 y/o M PMH CAD-Recent Stent to RCA in 6/8/18, ESRD on HD - MWF via LUE AVF (last HD 6/18/18 @ [facility]) , BPH, urinary outlet obstruction, Self Cath x 3yrs . Pt presented to ED in 6/18 at [place], [place], c/o CP found to have NSTEMI and at [facility] pt had Cardiac Cath revealing multivessel CAD. Patient was referred for PCI vs CT Surgery; however, patient and family decided to transfer for further evaluation and treatment at [facility]. During last hospitalization  pt was  found to have bladder obstruction 2/2 BPH and probable cystitis, Urine Cx + group B strep and was placed on Amoxicilin x 5 day course. Pt was seen by CT surgery Dr. Garcia and recommended PCI and pt underwent PCI to RCA 6/8. Pt presents today for further PCI/ stent to diagnal lesion. Pt is now s/p cardiac cath ALBERT x 1 1st diagonal .  Pt tolerated the procedure well, cardiac cath site benign. Post-procedure discharge instructions discussed and questions addressed     Renal-Dr Steinberg

## 2018-06-20 NOTE — DISCHARGE NOTE ADULT - CARE PLAN
Principal Discharge DX:	CAD (coronary artery disease)  Goal:	Pt remains chest pain free and understands post cath discharge instructions  Assessment and plan of treatment:	No heavy lifting or pushing/pulling with procedure arm for 2 weeks. No driving for 2 days. You may shower 24 hours following the procedure but avoid baths/swimming for 1 week. Check your wrist site for bleeding and/or swelling daily following procedure and call your doctor immediately if it occurs or if you experience increased pain at the site. Follow up with your cardiologist in 1-2 weeks. You may call [facility] if you have any questions/concerns regarding your procedure [phone].

## 2018-06-20 NOTE — DISCHARGE NOTE ADULT - PLAN OF CARE
Pt remains chest pain free and understands post cath discharge instructions No heavy lifting or pushing/pulling with procedure arm for 2 weeks. No driving for 2 days. You may shower 24 hours following the procedure but avoid baths/swimming for 1 week. Check your wrist site for bleeding and/or swelling daily following procedure and call your doctor immediately if it occurs or if you experience increased pain at the site. Follow up with your cardiologist in 1-2 weeks. You may call [facility] if you have any questions/concerns regarding your procedure [phone].

## 2018-06-20 NOTE — DISCHARGE NOTE ADULT - MEDICATION SUMMARY - MEDICATIONS TO TAKE
I will START or STAY ON the medications listed below when I get home from the hospital:    aspirin 81 mg oral delayed release tablet  -- 1 tab(s) by mouth once a day  -- Indication: For TO KEEP STENT OPEN     atorvastatin 40 mg oral tablet  -- 1 tab(s) by mouth once a day (at bedtime)  -- Indication: For High cholesterol     clopidogrel 75 mg oral tablet  -- 1 tab(s) by mouth once a day  -- Indication: For TO KEEP STENT OPEN     metoprolol tartrate 25 mg oral tablet  -- 1 tab(s) by mouth 2 times a day  -- Indication: For High blood pressure     latanoprost 0.005% ophthalmic solution  -- 1 drop(s) to each affected eye once a day (at bedtime)  -- Indication: For glaucoma     calcium acetate 667 mg oral tablet  -- 3 tab(s) by mouth 3 times a day with each meal  -- Indication: For ESRD    Joelle-Arya oral tablet  -- 1 tab(s) by mouth once a day  -- Indication: For ESRD

## 2018-06-20 NOTE — DISCHARGE NOTE ADULT - CARE PROVIDERS DIRECT ADDRESSES
[email or web address]

## 2018-06-20 NOTE — DISCHARGE NOTE ADULT - PATIENT PORTAL LINK FT
You can access the [facility] Patient Portal, offered by [facility], by registering with the following website: [email or web address]

## 2018-06-20 NOTE — H&P CARDIOLOGY - HISTORY OF PRESENT ILLNESS
83 y/o M PMH CAD, ESRD on HD  - MWF via LUE AVF (last HD 6/18/18 @ [facility]) , BPH, urinary outlet obstruction, self cath x3y. Pt presented to ED at [place], [place], c/o CP found to have NSTEMI  At [facility], cath revealing multivessel CAD; however, EKG changes not seen. At [facility], patient was referred for PCI; however, patient and family decided upon transfer and further evaluation and treatment at [facility], and was found to have bladder obstruction 2/2 BPH and probable cystitis, Urine Cx + group B strep and was placed on Amoxicilin x 5 day course.   ID consulted for uti- pt given 1 dose of ceftriaxone 7/2  Pt was seen by CT surgery Dr. Garcia and recommended PCI and pt underwent PCI to RCA 6/8     Pt presents today for further PCI/ stent to diagnal lesion.   Renal-Dr Wang 83 y/o M PMH CAD-Recent Stent to RCA in 6/8/18, ESRD on HD - MWF via LUE AVF (last HD 6/18/18 @ [facility]) , BPH, urinary outlet obstruction, Self Cath x 3yrs . Pt presented to ED in 6/18 at [place], [place], c/o CP found to have NSTEMI and at [facility] pt had Cardiac Cath revealing multivessel CAD. Patient was referred for PCI vs CT Surgery; however, patient and family decided to transfer for further evaluation and treatment at [facility]. During last hospitalization  pt was  found to have bladder obstruction 2/2 BPH and probable cystitis, Urine Cx + group B strep and was placed on Amoxicilin x 5 day course.   Pt was seen by CT surgery Dr. Garcia and recommended PCI and pt underwent PCI to RCA 6/8   Pt presents today for further PCI/ stent to diagnal lesion.   Renal-Dr Steinberg    < from: Cardiac Cath Lab - Adult (06.08.18 @ 10:16) >  VENTRICLES: No left ventriculogram was performed. No LV gram was performed; however, a recent echocardiogram demonstrated normal global and regional LV function.  CORONARY VESSELS: The coronary circulation is right dominant.  LM:   --  LM: This vessel was not injected, but was visualized during a prior cardiac catheterization.  LAD:   --  LAD: This vessel was notinjected, but was visualized during a prior cardiac catheterization.  CX:   --  Circumflex: This vessel was not injected, but was visualized during a prior cardiac catheterization.  RCA:   --  Proximal RCA: There was a diffuse 95 % stenosis. The lesion was heavily calcified. There was KATLYN grade 3 flow through the vessel (brisk flow).  --  Mid RCA: There was a diffuse 95 % stenosis. The lesion was heavily calcified. There was KATLYN grade 3 flow through the vessel (brisk flow). This is a likely culprit for the patient's clinical presentation.      INTERVENTIONAL RECOMMENDATIONS:  1. Successful orbital atherectomy (1.25mm anna) to the pRCA and mRCA followed by PCI (3.5mm and 3.5mm Resolute Mike ALBERT) in the setting of angina.  2. DAPT.  3. Given renal function and complexity will stage the D1 PCI.

## 2018-06-20 NOTE — CONSULT NOTE ADULT - SUBJECTIVE AND OBJECTIVE BOX
[facility]  [phone]  NEPHROLOGY      INITIAL CONSULT NOTE  --------------------------------------------------------------------------------  HPI:    83 y/o M PMH CAD-Recent Stent to RCA in 6/8/18, ESRD on HD - MWF via LUE AVF (last HD 6/18/18 @ [facility] ) , BPH, urinary outlet obstruction, Self Cath x 3yrs . Pt presented to ED in 6/18 at [place], [place], c/o CP found to have NSTEMI and at [facility] pt had Cardiac Cath revealing multivessel CAD. Patient was referred for PCI vs CT Surgery; however, patient and family decided to transfer for further evaluation and treatment at [facility]. During last hospitalization  pt was  found to have bladder obstruction 2/2 BPH and probable cystitis, Urine Cx + group B strep and was placed on Amoxicilin x 5 day course.   Pt was seen by CT surgery Dr. Garcia and recommended PCI and pt underwent PCI to RCA 6/8. he returned for staged procedure  further PCI/ stent to diagnal lesion.   Renal-Dr Steinberg    < from: Cardiac Cath Lab - Adult (06.08.18 @ 10:16) >  VENTRICLES: No left ventriculogram was performed. No LV gram was performed; however, a recent echocardiogram demonstrated normal global and regional LV function.  CORONARY VESSELS: The coronary circulation is right dominant.  LM:   --  LM: This vessel was not injected, but was visualized during a prior cardiac catheterization.  LAD:   --  LAD: This vessel was notinjected, but was visualized during a prior cardiac catheterization.  CX:   --  Circumflex: This vessel was not injected, but was visualized during a prior cardiac catheterization.  RCA:   --  Proximal RCA: There was a diffuse 95 % stenosis. The lesion was heavily calcified. There was KATLYN grade 3 flow through the vessel (brisk flow).  --  Mid RCA: There was a diffuse 95 % stenosis. The lesion was heavily calcified. There was KATLYN grade 3 flow through the vessel (brisk flow). This is a likely culprit for the patient's clinical presentation.      INTERVENTIONAL RECOMMENDATIONS:  1. Successful orbital atherectomy (1.25mm anna) to the pRCA and mRCA followed by PCI (3.5mm and 3.5mm Resolute Mike ALBERT) in the setting of angina.  2. DAPT.  3. Given renal function and complexity will stage the D1 PCI.   his last hd was 2 days ago. renal consult called. per pt with recent outpt hd had hypotensive episode. states has been taking his b-b before hd         PAST HISTORY  --------------------------------------------------------------------------------  PAST MEDICAL & SURGICAL HISTORY:  Cystitis  NSTEMI (non-ST elevated myocardial infarction)  CAD (coronary artery disease)  ESRD (end stage renal disease) on dialysis  BPH (benign prostatic hyperplasia)  AV fistula    FAMILY HISTORY: no ckd     PAST SOCIAL HISTORY: past tobacco but no alcohol     ALLERGIES & MEDICATIONS  --------------------------------------------------------------------------------  Allergies    No Known Allergies    Intolerances      Standing Inpatient Medications  atorvastatin 40 milliGRAM(s) Oral at bedtime  calcium acetate 2001 milliGRAM(s) Oral three times a day with meals  clopidogrel Tablet 75 milliGRAM(s) Oral daily  hydrALAZINE Injectable 10 milliGRAM(s) IV Push once  latanoprost 0.005% Ophthalmic Solution 1 Drop(s) Both EYES at bedtime  metoprolol tartrate 25 milliGRAM(s) Oral two times a day  Nephro-kwesi 1 Tablet(s) Oral daily    PRN Inpatient Medications      REVIEW OF SYSTEMS  --------------------------------------------------------------------------------  Gen: No  fevers/chills   Skin: No rashes  Head/Eyes/Ears/Mouth: No headache; Normal hearing;  No sinus pain/discomfort, sore throat  Respiratory: + dyspnea, cough, wheezing, hemoptysis  CV: No chest pain, orthopnea  GI: No abdominal pain, diarrhea, nausea, vomiting, melena  : No dysuria, decrease urination or hesitancy urinating  hematuria, nocturia. self catherization   MSK: No joint pain/swelling; no back pain  Neuro: No dizziness/lightheadedness, weakness, seizures,    also with no edema     All other systems were reviewed and are negative, except as noted.    VITALS/PHYSICAL EXAM  --------------------------------------------------------------------------------  T(C): 36.4 (06-20-18 @ 09:25), Max: 36.6 (06-20-18 @ 06:40)  HR: 81 (06-20-18 @ 12:45) (64 - 81)  BP: 203/64 (06-20-18 @ 12:45) (133/60 - 206/76)  RR: 17 (06-20-18 @ 12:45) (16 - 18)  SpO2: 100% (06-20-18 @ 12:45) (95% - 100%)  Wt(kg): --  Height (cm): 162.56 (06-20-18 @ 06:40)  Weight (kg): 63.5 (06-20-18 @ 06:40)  BMI (kg/m2): 24 (06-20-18 @ 06:40)  BSA (m2): 1.68 (06-20-18 @ 06:40)      06-20-18 @ 07:01  -  06-20-18 @ 13:07  --------------------------------------------------------  IN: 320 mL / OUT: 0 mL / NET: 320 mL      Physical Exam:  	Gen: Non toxic comfortable appearing   	no jvd , supple neck,   	Pulm: decrease bs  no rales or ronchi or wheezing  	CV: RRR, S1S2; no rub  	Abd: +BS, soft, nontender/nondistended  	: No suprapubic tenderness  	UE: Warm, no cyanosis  no clubbing,  no edema; no asterixis. Right wrist pressure dressing   	LE: Warm, no cyanosis  no clubbing, no edema  	Neuro: alert and oriented. speech coherent   	Psych: Normal affect and mood  	Skin: Warm, no decrease skin turgor   	Vascular access: avf + bruit and thrill     LABS/STUDIES  --------------------------------------------------------------------------------              10.9   10.2  >-----------<  269      [06-20-18 @ 06:59]              32.0     137  |  92  |  52  ----------------------------<  87      [06-20-18 @ 06:59]  4.8   |  26  |  8.46        Ca     10.3     [06-20-18 @ 06:59]    TPro  7.5  /  Alb  4.0  /  TBili  0.6  /  DBili  x   /  AST  10  /  ALT  10  /  AlkPhos  59  [06-20-18 @ 06:59]          Creatinine Trend:  SCr 8.46 [06-20 @ 06:59]  SCr 9.50 [06-11 @ 06:30]  SCr 7.42 [06-10 @ 05:54]  SCr 5.25 [06-09 @ 06:56]  SCr 7.58 [06-08 @ 06:10]    Urinalysis - [06-07-18 @ 07:23]      Color Yellow / Appearance Clear / SG 1.012 / pH >9.0      Gluc 100 / Ketone Negative  / Bili Negative / Urobili Negative       Blood Small / Protein 300 / Leuk Est Moderate / Nitrite Negative      RBC 5-10 / WBC >50 / Hyaline  / Gran  / Sq Epi  / Non Sq Epi Few / Bacteria       HbA1c 5.0      [06-07-18 @ 11:06]  TSH 2.15      [06-07-18 @ 08:03]    HBsAb <3.0      [06-08-18 @ 22:10]  HBsAg Nonreact      [06-08-18 @ 22:10]  HBcAb Nonreact      [06-08-18 @ 22:10]  HCV 0.20, Nonreact      [06-08-18 @ 22:10]

## 2018-06-20 NOTE — DISCHARGE NOTE ADULT - ADDITIONAL INSTRUCTIONS
Follow-up with your cardiologist in 1-2 weeks   *************Do not stop you Aspirin or Plavix unless instructed to do so by your cardiologist******************.

## 2018-06-20 NOTE — CHART NOTE - NSCHARTNOTEFT_GEN_A_CORE
Patient underwent a PCI procedure and is being admitted as they are at increased risk for major adverse cardiac and vascular events if discharged due to the following high risk characteristics:      ESRD,  greater than 75 years old  rotablator    Gentry Catherine, NP  x [phone]

## 2018-06-20 NOTE — CONSULT NOTE ADULT - ASSESSMENT
83 y/o M PMH CAD-Recent Stent to RCA in 6/8/18, ESRD on HD - MWF via LUE AVF (last HD 6/18/18 @ [facility]) , BPH, urinary outlet obstruction, Self Cath x 3yrs .  c/o CP found to have NSTEMI During last hospitalization  pt was  found to have bladder obstruction 2/2 BPH and probable cystitis, Urine Cx + group B strep and was placed on Amoxicilin x 5 day course.   underwent PCI to RCA 6/8. now s/p PCI diagonal. last hd 2 days ago    1- esrd  2- intradialytic hypotension   3- hyperlipidemia  4- CAD  5- htn     hd consent obtained and witnessed placed in chart.   discussed with pt and family hd timing increase to at least 3 hours minimal given pt only receiving 2 hr 50 min. discussed with them regarding why the increase time is more beneficial in his case  explained dialysis parameters to be followed and monitored and different labs to follow etc. pt educated on all these issues and parameters  to hold off b-b before hd given intradialytic hypotension   cont with zocor

## 2018-06-21 VITALS
OXYGEN SATURATION: 95 % | SYSTOLIC BLOOD PRESSURE: 142 MMHG | RESPIRATION RATE: 16 BRPM | TEMPERATURE: 98 F | DIASTOLIC BLOOD PRESSURE: 58 MMHG | HEART RATE: 77 BPM

## 2018-06-21 LAB
ANION GAP SERPL CALC-SCNC: 13 MMOL/L — SIGNIFICANT CHANGE UP (ref 5–17)
BUN SERPL-MCNC: 24 MG/DL — HIGH (ref 7–23)
CALCIUM SERPL-MCNC: 9.8 MG/DL — SIGNIFICANT CHANGE UP (ref 8.4–10.5)
CHLORIDE SERPL-SCNC: 94 MMOL/L — LOW (ref 96–108)
CO2 SERPL-SCNC: 30 MMOL/L — SIGNIFICANT CHANGE UP (ref 22–31)
CREAT SERPL-MCNC: 4.87 MG/DL — HIGH (ref 0.5–1.3)
GLUCOSE SERPL-MCNC: 157 MG/DL — HIGH (ref 70–99)
HCT VFR BLD CALC: 31.2 % — LOW (ref 39–50)
HGB BLD-MCNC: 10.9 G/DL — LOW (ref 13–17)
MCHC RBC-ENTMCNC: 34.1 PG — HIGH (ref 27–34)
MCHC RBC-ENTMCNC: 34.9 GM/DL — SIGNIFICANT CHANGE UP (ref 32–36)
MCV RBC AUTO: 97.7 FL — SIGNIFICANT CHANGE UP (ref 80–100)
PLATELET # BLD AUTO: 271 K/UL — SIGNIFICANT CHANGE UP (ref 150–400)
POTASSIUM SERPL-MCNC: 3.5 MMOL/L — SIGNIFICANT CHANGE UP (ref 3.5–5.3)
POTASSIUM SERPL-SCNC: 3.5 MMOL/L — SIGNIFICANT CHANGE UP (ref 3.5–5.3)
RBC # BLD: 3.19 M/UL — LOW (ref 4.2–5.8)
RBC # FLD: 14 % — SIGNIFICANT CHANGE UP (ref 10.3–14.5)
SODIUM SERPL-SCNC: 137 MMOL/L — SIGNIFICANT CHANGE UP (ref 135–145)
WBC # BLD: 10.2 K/UL — SIGNIFICANT CHANGE UP (ref 3.8–10.5)
WBC # FLD AUTO: 10.2 K/UL — SIGNIFICANT CHANGE UP (ref 3.8–10.5)

## 2018-06-21 PROCEDURE — C1769: CPT

## 2018-06-21 PROCEDURE — 99261: CPT

## 2018-06-21 PROCEDURE — 93005 ELECTROCARDIOGRAM TRACING: CPT

## 2018-06-21 PROCEDURE — 99152 MOD SED SAME PHYS/QHP 5/>YRS: CPT

## 2018-06-21 PROCEDURE — 99153 MOD SED SAME PHYS/QHP EA: CPT

## 2018-06-21 PROCEDURE — C1874: CPT

## 2018-06-21 PROCEDURE — 80053 COMPREHEN METABOLIC PANEL: CPT

## 2018-06-21 PROCEDURE — C1725: CPT

## 2018-06-21 PROCEDURE — 80048 BASIC METABOLIC PNL TOTAL CA: CPT

## 2018-06-21 PROCEDURE — C1894: CPT

## 2018-06-21 PROCEDURE — C9602: CPT | Mod: LD

## 2018-06-21 PROCEDURE — C1724: CPT

## 2018-06-21 PROCEDURE — 85027 COMPLETE CBC AUTOMATED: CPT

## 2018-06-21 PROCEDURE — C1887: CPT

## 2018-06-21 RX ADMIN — Medication 25 MILLIGRAM(S): at 07:35

## 2018-06-21 RX ADMIN — Medication 2001 MILLIGRAM(S): at 07:35

## 2018-06-21 RX ADMIN — Medication 81 MILLIGRAM(S): at 05:47

## 2018-06-21 RX ADMIN — CLOPIDOGREL BISULFATE 75 MILLIGRAM(S): 75 TABLET, FILM COATED ORAL at 05:47

## 2018-06-21 NOTE — PROGRESS NOTE ADULT - SUBJECTIVE AND OBJECTIVE BOX
Patient is a 82y old  Male who presents with  CAD (2018 13:14) now s/p C ALBERT x 1 D1 via right radial artery access.           Allergies    No Known Allergies    Intolerances        Medications:  aspirin enteric coated 81 milliGRAM(s) Oral daily  atorvastatin 40 milliGRAM(s) Oral at bedtime  calcium acetate 2001 milliGRAM(s) Oral three times a day with meals  clopidogrel Tablet 75 milliGRAM(s) Oral daily  latanoprost 0.005% Ophthalmic Solution 1 Drop(s) Both EYES at bedtime  metoprolol tartrate 25 milliGRAM(s) Oral two times a day  Nephro-kwesi 1 Tablet(s) Oral daily      Vitals:  T(C): 36.7 (18 @ 21:30), Max: 37.1 (18 @ 21:00)  HR: 77 (18 @ 21:30) (64 - 81)  BP: 131/77 (18 @ 21:30) (124/53 - 206/76)  BP(mean): 84 (18 @ 06:40) (84 - 84)  RR: 16 (18 @ 21:30) (16 - 18)  SpO2: 100% (18 @ 21:30) (95% - 100%)  Wt(kg): --  Daily Height in cm: 162.56 (2018 06:40)    Daily Weight in k.1 (2018 21:00)  I&O's Summary    2018 07:01  -  2018 02:30  --------------------------------------------------------  IN: 900 mL / OUT: 1000 mL / NET: -100 mL          Physical Exam:  Appearance: Normal  Cardiovascular: S1S2, RRR, No Lower extremity edema  Procedural Access Site: Right radial artery access site. No hematoma, Non-tender to palpation, 2+ pulse, No bruit, No Ecchymosis  Respiratory: Clear to auscultation bilaterally  Musculoskeletal: No clubbing, No joint deformity   Neurologic: Non-focal  Psychiatry: AAOx3, Mood & affect appropriate  Skin: No rashes, No ecchymoses, No cyanosis        137  |  94<L>  |  24<H>  ----------------------------<  157<H>  3.5   |  30  |  4.87<H>    Ca    9.8      2018 00:37    TPro  7.5  /  Alb  4.0  /  TBili  0.6  /  DBili  x   /  AST  10  /  ALT  10  /  AlkPhos  59          Interpretation of Telemetry: 76bpm    ASSESSMENT/PLAN   Patient is a 82y old  Male who presents with  CAD (2018 13:14) now s/p LHC ALBERT x 1 D1 via right radial artery access. Pt tolerated the procedure well, cardiac cath site benign. Discharge planning pending

## 2018-07-24 ENCOUNTER — APPOINTMENT (OUTPATIENT)
Dept: NUCLEAR MEDICINE | Facility: HOSPITAL | Age: 83
End: 2018-07-24

## 2018-09-11 PROBLEM — N18.6 END STAGE RENAL DISEASE: Chronic | Status: ACTIVE | Noted: 2018-06-07

## 2018-09-11 PROBLEM — I25.10 ATHEROSCLEROTIC HEART DISEASE OF NATIVE CORONARY ARTERY WITHOUT ANGINA PECTORIS: Chronic | Status: ACTIVE | Noted: 2018-06-07

## 2018-09-11 PROBLEM — I21.4 NON-ST ELEVATION (NSTEMI) MYOCARDIAL INFARCTION: Chronic | Status: ACTIVE | Noted: 2018-06-07

## 2018-09-11 PROBLEM — N30.90 CYSTITIS, UNSPECIFIED WITHOUT HEMATURIA: Chronic | Status: ACTIVE | Noted: 2018-06-07

## 2018-09-11 PROBLEM — N40.0 BENIGN PROSTATIC HYPERPLASIA WITHOUT LOWER URINARY TRACT SYMPTOMS: Chronic | Status: ACTIVE | Noted: 2018-06-07

## 2018-10-02 ENCOUNTER — APPOINTMENT (OUTPATIENT)
Dept: CARDIOLOGY | Facility: CLINIC | Age: 83
End: 2018-10-02
Payer: MEDICARE

## 2018-10-02 ENCOUNTER — OUTPATIENT (OUTPATIENT)
Dept: OUTPATIENT SERVICES | Facility: HOSPITAL | Age: 83
LOS: 1 days | End: 2018-10-02
Payer: MEDICARE

## 2018-10-02 VITALS
OXYGEN SATURATION: 96 % | DIASTOLIC BLOOD PRESSURE: 66 MMHG | HEIGHT: 63 IN | HEART RATE: 73 BPM | SYSTOLIC BLOOD PRESSURE: 125 MMHG

## 2018-10-02 VITALS
SYSTOLIC BLOOD PRESSURE: 110 MMHG | TEMPERATURE: 98 F | DIASTOLIC BLOOD PRESSURE: 60 MMHG | RESPIRATION RATE: 16 BRPM | HEART RATE: 62 BPM | HEIGHT: 62.5 IN | OXYGEN SATURATION: 99 % | WEIGHT: 138.89 LBS

## 2018-10-02 DIAGNOSIS — C43.61 MALIGNANT MELANOMA OF RIGHT UPPER LIMB, INCLUDING SHOULDER: ICD-10-CM

## 2018-10-02 DIAGNOSIS — I10 ESSENTIAL (PRIMARY) HYPERTENSION: ICD-10-CM

## 2018-10-02 DIAGNOSIS — I25.10 ATHEROSCLEROTIC HEART DISEASE OF NATIVE CORONARY ARTERY WITHOUT ANGINA PECTORIS: Chronic | ICD-10-CM

## 2018-10-02 DIAGNOSIS — I25.10 ATHEROSCLEROTIC HEART DISEASE OF NATIVE CORONARY ARTERY WITHOUT ANGINA PECTORIS: ICD-10-CM

## 2018-10-02 DIAGNOSIS — I77.0 ARTERIOVENOUS FISTULA, ACQUIRED: Chronic | ICD-10-CM

## 2018-10-02 LAB
BUN SERPL-MCNC: 41 MG/DL — HIGH (ref 7–23)
CALCIUM SERPL-MCNC: 10 MG/DL — SIGNIFICANT CHANGE UP (ref 8.4–10.5)
CHLORIDE SERPL-SCNC: 94 MMOL/L — LOW (ref 98–107)
CO2 SERPL-SCNC: 30 MMOL/L — SIGNIFICANT CHANGE UP (ref 22–31)
CREAT SERPL-MCNC: 7.15 MG/DL — HIGH (ref 0.5–1.3)
GLUCOSE SERPL-MCNC: 79 MG/DL — SIGNIFICANT CHANGE UP (ref 70–99)
HCT VFR BLD CALC: 34.4 % — LOW (ref 39–50)
HGB BLD-MCNC: 11.2 G/DL — LOW (ref 13–17)
MCHC RBC-ENTMCNC: 31.6 PG — SIGNIFICANT CHANGE UP (ref 27–34)
MCHC RBC-ENTMCNC: 32.6 % — SIGNIFICANT CHANGE UP (ref 32–36)
MCV RBC AUTO: 97.2 FL — SIGNIFICANT CHANGE UP (ref 80–100)
NRBC # FLD: 0 — SIGNIFICANT CHANGE UP
PLATELET # BLD AUTO: 177 K/UL — SIGNIFICANT CHANGE UP (ref 150–400)
PMV BLD: 12 FL — SIGNIFICANT CHANGE UP (ref 7–13)
POTASSIUM SERPL-MCNC: 4.8 MMOL/L — SIGNIFICANT CHANGE UP (ref 3.5–5.3)
POTASSIUM SERPL-SCNC: 4.8 MMOL/L — SIGNIFICANT CHANGE UP (ref 3.5–5.3)
RBC # BLD: 3.54 M/UL — LOW (ref 4.2–5.8)
RBC # FLD: 14.1 % — SIGNIFICANT CHANGE UP (ref 10.3–14.5)
SODIUM SERPL-SCNC: 139 MMOL/L — SIGNIFICANT CHANGE UP (ref 135–145)
WBC # BLD: 8.6 K/UL — SIGNIFICANT CHANGE UP (ref 3.8–10.5)
WBC # FLD AUTO: 8.6 K/UL — SIGNIFICANT CHANGE UP (ref 3.8–10.5)

## 2018-10-02 PROCEDURE — 93010 ELECTROCARDIOGRAM REPORT: CPT

## 2018-10-02 PROCEDURE — 99214 OFFICE O/P EST MOD 30 MIN: CPT

## 2018-10-02 PROCEDURE — 93000 ELECTROCARDIOGRAM COMPLETE: CPT

## 2018-10-02 RX ORDER — DUTASTERIDE 0.5 MG/1
1 CAPSULE, LIQUID FILLED ORAL
Qty: 0 | Refills: 0 | COMMUNITY

## 2018-10-02 NOTE — H&P PST ADULT - PROBLEM SELECTOR PLAN 3
Pt instructed to take metoprolol the morning of the surgery.  SUNITA precautions. Pt >3 criteria on STOP-Bang questionnaire.  OR booking notified.

## 2018-10-02 NOTE — H&P PST ADULT - PMH
BPH (benign prostatic hyperplasia)    CAD (coronary artery disease)    Cystitis    ESRD (end stage renal disease) on dialysis    Glaucoma    Hypertension    Malignant melanoma of right upper limb, including shoulder    NSTEMI (non-ST elevated myocardial infarction)

## 2018-10-02 NOTE — H&P PST ADULT - NEUROLOGICAL DETAILS
normal strength/responds to verbal commands/alert and oriented x 3/responds to pain/sensation intact

## 2018-10-02 NOTE — H&P PST ADULT - NSANTHOSAYNRD_GEN_A_CORE
No. SUNITA screening performed.  STOP BANG Legend: 0-2 = LOW Risk; 3-4 = INTERMEDIATE Risk; 5-8 = HIGH Risk

## 2018-10-02 NOTE — H&P PST ADULT - ATTENDING COMMENTS
83-year-old man with a 1.0 mm melanoma of the right side of his back scheduled for wide excision with sentinel node biopsy and reconstruction by Dr. Delgado.    Diagnosis and plan of management were reviewed with him and his daughter in my office, and again the morning of operation.    All questions answered, consent on chart

## 2018-10-02 NOTE — H&P PST ADULT - NEGATIVE NEUROLOGICAL SYMPTOMS
no weakness/no generalized seizures/no paresthesias/no vertigo/no difficulty walking/no headache/no tremors

## 2018-10-02 NOTE — H&P PST ADULT - HISTORY OF PRESENT ILLNESS
Pt reports he saw his dermatologist 1 year Dr. Landa.  Dermatologist referred patient to specialist for melanoma, Dr. Portillo. Pt was recommended for surgery in July 2018.  Pt reported chest pain in June.  Pt went to [facility], pt had echocardiogram, catheterization and 1 cardiac stent was placed on 6/7/18.  2nd cardiac stent was placed 6/20.  Pt has a history of end stage renal disease, pt has fistula left arm, pt get dialysis M/W/F. Pt reports he saw his dermatologist 1 year Dr. Landa.  Dermatologist referred patient to specialist for melanoma, Dr. Portillo. Pt was recommended for surgery in July 2018.  Pt reported chest pain in June.  Pt went to [facility], pt had echocardiogram, catheterization and 1 cardiac stent was placed on 6/7/18.  2nd cardiac stent was placed 6/20.  Pt has a history of end stage renal disease, pt has fistula left arm, pt get dialysis M/W/F.  Pt is scheduled for wide excision melanoma right posterior shoulder right axillary sentinel lymph node biopsy on 10/16/18

## 2018-10-02 NOTE — H&P PST ADULT - PROBLEM SELECTOR PLAN 1
Pt is scheduled for wide excision melanoma right posterior shoulder right axillary sentinel lymph node biopsy on 10/16/18  Pre-op instructions given, patient verbalized understanding.   Chlorhexidine wash provided, instructions given.     Medical evaluation requested by surgeon.  Medical clearance in the chart. Cardiac clearance in the chart.

## 2018-10-02 NOTE — H&P PST ADULT - MALLAMPATI CLASS
Class II - visualization of the soft palate, fauces, and uvula on phonation/Class I (easy) - visualization of the soft palate, fauces, uvula, and both anterior and posterior pillars

## 2018-10-02 NOTE — H&P PST ADULT - PSH
AV fistula  2016  CAD (coronary artery disease)  x2 cardiac stent, 1st stent placed in 6/7/18 and 6/20/18 at [facility]

## 2018-10-02 NOTE — H&P PST ADULT - NEGATIVE MUSCULOSKELETAL SYMPTOMS
no stiffness/no muscle weakness/no neck pain/no leg pain R/no muscle cramps/no back pain/no leg pain L

## 2018-10-02 NOTE — H&P PST ADULT - NEGATIVE ENMT SYMPTOMS
no nasal discharge/no throat pain/no dysphagia/no tinnitus/no vertigo/no sinus symptoms/no nasal congestion

## 2018-10-03 ENCOUNTER — APPOINTMENT (OUTPATIENT)
Dept: PLASTIC SURGERY | Facility: CLINIC | Age: 83
End: 2018-10-03
Payer: MEDICARE

## 2018-10-03 VITALS
BODY MASS INDEX: 24.8 KG/M2 | HEART RATE: 57 BPM | WEIGHT: 140 LBS | SYSTOLIC BLOOD PRESSURE: 132 MMHG | DIASTOLIC BLOOD PRESSURE: 69 MMHG | RESPIRATION RATE: 16 BRPM | HEIGHT: 63 IN | OXYGEN SATURATION: 95 %

## 2018-10-03 PROCEDURE — 99204 OFFICE O/P NEW MOD 45 MIN: CPT

## 2018-10-15 ENCOUNTER — FORM ENCOUNTER (OUTPATIENT)
Age: 83
End: 2018-10-15

## 2018-10-15 ENCOUNTER — TRANSCRIPTION ENCOUNTER (OUTPATIENT)
Age: 83
End: 2018-10-15

## 2018-10-15 NOTE — ASU PATIENT PROFILE, ADULT - PSH
AV fistula  2016  CAD (coronary artery disease)  x2 cardiac stent, 1st stent placed in 6/7/18 and 6/20/18 at [facility]

## 2018-10-15 NOTE — CHART NOTE - NSCHARTNOTEFT_GEN_A_CORE
Pre-operative Note    - Pre-operative Diagnosis: Melanoma    - Procedure: Wide excision melanoma, right posterior shoulder, right axillary sentinel lymph node biopsy    Brief History: 83 year old male w/ ESRD on HD (MWF) who presents after seeing his dermatologist for excision of melanoma. Pt was initially scheduled in June, however had two cardiac stents placed during June, and surgery was rescheduled. The patient presents now for wide excision of right posterior shoulder melanoma and right axillary sentinel node biopsy with plastics closure.     Pathology: Malignant melanoma, 1mm thickness, stage T1b, no ulceration    Imaging: CT Chest w/o contrast - normal    Surgical Plan: Wide excision of right posterior shoulder melanoma with margins of 1 cm - measured from edge of lesion (as pathology shows 1mm thickness). Removal of skin and subcutaneous tissue will be performed to the muscle fascia. [place] lymph node biopsy will be performed after. A radioactive tracer will be injected into the skin adjacent to the melanoma to locate the sentinel node in the right axilla.    - Permits:  > Consent in chart.  > Case scheduled with OR.    Zana, General Surgery

## 2018-10-16 ENCOUNTER — RESULT REVIEW (OUTPATIENT)
Age: 83
End: 2018-10-16

## 2018-10-16 ENCOUNTER — APPOINTMENT (OUTPATIENT)
Dept: NUCLEAR MEDICINE | Facility: HOSPITAL | Age: 83
End: 2018-10-16

## 2018-10-16 ENCOUNTER — APPOINTMENT (OUTPATIENT)
Dept: SURGICAL ONCOLOGY | Facility: HOSPITAL | Age: 83
End: 2018-10-16

## 2018-10-16 ENCOUNTER — OUTPATIENT (OUTPATIENT)
Dept: OUTPATIENT SERVICES | Facility: HOSPITAL | Age: 83
LOS: 1 days | Discharge: HOME | End: 2018-10-16
Payer: MEDICARE

## 2018-10-16 VITALS
OXYGEN SATURATION: 97 % | DIASTOLIC BLOOD PRESSURE: 57 MMHG | TEMPERATURE: 98 F | HEART RATE: 60 BPM | WEIGHT: 138.89 LBS | RESPIRATION RATE: 18 BRPM | SYSTOLIC BLOOD PRESSURE: 117 MMHG | HEIGHT: 62.5 IN

## 2018-10-16 VITALS
SYSTOLIC BLOOD PRESSURE: 148 MMHG | OXYGEN SATURATION: 97 % | RESPIRATION RATE: 16 BRPM | HEART RATE: 78 BPM | DIASTOLIC BLOOD PRESSURE: 58 MMHG | TEMPERATURE: 97 F

## 2018-10-16 DIAGNOSIS — I25.10 ATHEROSCLEROTIC HEART DISEASE OF NATIVE CORONARY ARTERY WITHOUT ANGINA PECTORIS: Chronic | ICD-10-CM

## 2018-10-16 DIAGNOSIS — C43.61 MALIGNANT MELANOMA OF RIGHT UPPER LIMB, INCLUDING SHOULDER: ICD-10-CM

## 2018-10-16 DIAGNOSIS — I77.0 ARTERIOVENOUS FISTULA, ACQUIRED: Chronic | ICD-10-CM

## 2018-10-16 LAB — POTASSIUM BLDV-SCNC: 4.8 MMOL/L — HIGH (ref 3.4–4.5)

## 2018-10-16 PROCEDURE — 38792 RA TRACER ID OF SENTINL NODE: CPT

## 2018-10-16 PROCEDURE — 88307 TISSUE EXAM BY PATHOLOGIST: CPT | Mod: 26

## 2018-10-16 PROCEDURE — 88341 IMHCHEM/IMCYTCHM EA ADD ANTB: CPT | Mod: 26

## 2018-10-16 PROCEDURE — 78195 LYMPH SYSTEM IMAGING: CPT | Mod: 26

## 2018-10-16 PROCEDURE — 88305 TISSUE EXAM BY PATHOLOGIST: CPT | Mod: 26

## 2018-10-16 PROCEDURE — 88342 IMHCHEM/IMCYTCHM 1ST ANTB: CPT | Mod: 26

## 2018-10-16 PROCEDURE — 15734 MUSCLE-SKIN GRAFT TRUNK: CPT

## 2018-10-16 PROCEDURE — 13101 CMPLX RPR TRUNK 2.6-7.5 CM: CPT | Mod: 59

## 2018-10-16 PROCEDURE — 38525 BIOPSY/REMOVAL LYMPH NODES: CPT

## 2018-10-16 PROCEDURE — 11603 EXC TR-EXT MAL+MARG 2.1-3 CM: CPT

## 2018-10-16 RX ORDER — SODIUM CHLORIDE 9 MG/ML
1000 INJECTION INTRAMUSCULAR; INTRAVENOUS; SUBCUTANEOUS
Qty: 0 | Refills: 0 | Status: DISCONTINUED | OUTPATIENT
Start: 2018-10-16 | End: 2018-10-16

## 2018-10-16 RX ORDER — SODIUM CHLORIDE 9 MG/ML
3 INJECTION INTRAMUSCULAR; INTRAVENOUS; SUBCUTANEOUS EVERY 8 HOURS
Qty: 0 | Refills: 0 | Status: DISCONTINUED | OUTPATIENT
Start: 2018-10-16 | End: 2018-10-16

## 2018-10-16 RX ORDER — HYDROMORPHONE HYDROCHLORIDE 2 MG/ML
0.5 INJECTION INTRAMUSCULAR; INTRAVENOUS; SUBCUTANEOUS
Qty: 0 | Refills: 0 | Status: DISCONTINUED | OUTPATIENT
Start: 2018-10-16 | End: 2018-10-16

## 2018-10-16 RX ORDER — TOBRAMYCIN 0.3 %
1 DROPS OPHTHALMIC (EYE) EVERY 4 HOURS
Qty: 0 | Refills: 0 | Status: DISCONTINUED | OUTPATIENT
Start: 2018-10-16 | End: 2018-10-31

## 2018-10-16 RX ORDER — CEPHALEXIN 500 MG
1 CAPSULE ORAL
Qty: 21 | Refills: 0 | OUTPATIENT
Start: 2018-10-16 | End: 2018-10-22

## 2018-10-16 RX ADMIN — Medication 1 DROP(S): at 15:36

## 2018-10-16 NOTE — ASU DISCHARGE PLAN (ADULT/PEDIATRIC). - ITEMS TO FOLLOWUP WITH YOUR PHYSICIAN'S
Please follow up with Dr. Delgado (plastic surgery) NEXT Thursday 10/25/18.  Please call the office for an appt: [phone].  Dr. Portillo will be calling with pathology results in 10-14 days; the results will determine subsequent follow up.

## 2018-10-16 NOTE — ASU DISCHARGE PLAN (ADULT/PEDIATRIC). - NOTIFY
Numbness, color, or temperature change to extremity/Bleeding that does not stop/Fever greater than 101/Pain not relieved by Medications/Swelling that continues/Persistent Nausea and Vomiting/Unable to Urinate

## 2018-10-16 NOTE — BRIEF OPERATIVE NOTE - OPERATION/FINDINGS
Minimal 1 cm margin for primary lesion.    Right axillary sentinel nodes Minimal 1 cm margin for primary lesion.    Right axillary sentinel nodes: Blue, 800 & 300, BG = 0

## 2018-10-16 NOTE — PROGRESS NOTE ADULT - SUBJECTIVE AND OBJECTIVE BOX
Called to bedside by RN for Pt. with complaint of "something in my eye"  Pt. denies diplopia, blurry vision,  or photophobia.  Eye irrigated with normal saline without alleviation of symptoms.    PE:  OD: PERRL, EOMI, sclera white, conjunctiva pink, no foreign object seen. + corneal abrasion seen at the 9-3 o'clock position.  A/P  Corneal abrasion OD  1) tetracaine opht. Solution 0.5% one drop to the affected eye times one  2) Tobramycin opht. solution 0.3% one drop to the affected eye every four hours times three doses  3) F/U with opht. if symptoms persist >24 hours  4) Case D/W Dr. Alvarado_

## 2018-10-16 NOTE — BRIEF OPERATIVE NOTE - SPECIMENS
Right axillary sentinel nodes, skin and soft tissue right upper back
additional 9 o clock margin stitch marks true margin

## 2018-10-16 NOTE — BRIEF OPERATIVE NOTE - OPERATION/FINDINGS
Right posterior shoulder wound s/p wide-excision of melanoma  right axillary wound s/p sentinel LN biopsy    right shoulder reconstruction with local flap; closed w/ 3-0 vicryl, 4-0 nylon  dressings: xeroform, 4x4, tegederm    right axillary wound closed primary with 3-0 vicryl, 3-0 monocryl  dressings: dermabond + steri

## 2018-10-16 NOTE — BRIEF OPERATIVE NOTE - PROCEDURE
<<-----Click on this checkbox to enter Procedure Adjacent tissue transfer  10/16/2018  local flap (rhomboid) reconstruction of right upper back/shoulder s/p excision of melanoma,  primary closure of right axillary wound s/p sentinel LN biopsy  Active  BKWON

## 2018-10-16 NOTE — ASU DISCHARGE PLAN (ADULT/PEDIATRIC). - YOU WERE IN THE HOSPITAL FOR:
Wide excision of RIGHT back melanoma with right axillary sentinel lymph node biopsy by Dr. Portillo with Plastics Reconstruction by Dr. Delgado

## 2018-10-16 NOTE — BRIEF OPERATIVE NOTE - PRE-OP DX
Melanoma of back  10/16/2018    Active  Pedro Portillo
Melanoma of back  10/16/2018    Active  Pedro Portillo

## 2018-10-16 NOTE — ASU DISCHARGE PLAN (ADULT/PEDIATRIC). - MEDICATION SUMMARY - MEDICATIONS TO TAKE
I will START or STAY ON the medications listed below when I get home from the hospital:    Percocet 5/325 oral tablet  -- 1 tab(s) by mouth every 6 hours, As Needed -for severe pain MDD:4 tabs   -- Caution federal law prohibits the transfer of this drug to any person other  than the person for whom it was prescribed.  May cause drowsiness.  Alcohol may intensify this effect.  Use care when operating dangerous machinery.  This prescription cannot be refilled.  This product contains acetaminophen.  Do not use  with any other product containing acetaminophen to prevent possible liver damage.  Using more of this medication than prescribed may cause serious breathing problems.    -- Indication: For for severe pain following surgery    aspirin 81 mg oral tablet  -- 1 tab(s) by mouth once a day  -- Indication: For home medication    clopidogrel 75 mg oral tablet  -- 1 tab(s) by mouth once a day  -- Indication: For home medication    Metoprolol Tartrate 25 mg oral tablet  -- 1 tab(s) by mouth 2 times a day  -- Indication: For home medication    Keflex 500 mg oral capsule  -- 1 cap(s) by mouth 3 times a day MDD:3 capsules  -- Finish all this medication unless otherwise directed by prescriber.    -- Indication: For antibiotics following surgery - take 3x a day with meals until finished    sucralfate 1 g oral tablet  -- 1 tab(s) by mouth 4 times a day (before meals and at bedtime)  -- Indication: For home medication    latanoprost 0.005% ophthalmic solution  -- 1 drop(s) to each affected eye once a day (in the evening)  -- Indication: For home medication    calcium acetate 667 mg oral tablet  -- 2 tab(s) by mouth 3 times a day  -- Indication: For home medication    ferric citrate 210 mg oral tablet  -- 1 tab(s) by mouth 3 times a day  -- Indication: For home medication    Joelle-Arya oral tablet  -- 1 tab(s) by mouth once a day last dose 10/8/18  -- Indication: For home medication    Vitamin B-12  -- 1000 microgram(s) by mouth once a day  -- Indication: For home medication

## 2018-10-16 NOTE — BRIEF OPERATIVE NOTE - PROCEDURE
<<-----Click on this checkbox to enter Procedure Melanoma excision  10/16/2018  Scintigraphy, right axillary sentinel lymph adenectomy, wide excision melanoma right upper back, Reconstruction Dr. Delgado  Active  MBEG

## 2018-10-16 NOTE — ASU DISCHARGE PLAN (ADULT/PEDIATRIC). - SPECIAL INSTRUCTIONS
Please keep all dressings intact, no need to change them.    Right shoulder:     Right Axilla: Keep incision dry for next 3 days. After 3 days you may get area wet; just pat dry after taking a quick shower. ***Please keep all dressings intact, no need to change them***    Right posterior shoulder: please keep ALL the dressings intact, do not remove them. They may become saturated with drainage and blood which is NORMAL.  Keep the incision dry.   some bruising and soreness may occur.    Right Axilla: Keep incision dry for next 2 days. You may take a quick shower starting Friday.    Take medications as directed.

## 2018-10-16 NOTE — BRIEF OPERATIVE NOTE - POST-OP DX
Melanoma of back  10/16/2018  right upper  Active  Pedro Portillo
Melanoma of back  10/16/2018  right upper  Active  Pedro Portillo

## 2018-10-17 PROBLEM — H40.9 UNSPECIFIED GLAUCOMA: Chronic | Status: ACTIVE | Noted: 2018-10-02

## 2018-10-17 PROBLEM — I10 ESSENTIAL (PRIMARY) HYPERTENSION: Chronic | Status: ACTIVE | Noted: 2018-10-02

## 2018-10-17 PROBLEM — C43.61 MALIGNANT MELANOMA OF RIGHT UPPER LIMB, INCLUDING SHOULDER: Chronic | Status: ACTIVE | Noted: 2018-10-02

## 2018-10-22 LAB — SURGICAL PATHOLOGY STUDY: SIGNIFICANT CHANGE UP

## 2018-10-25 ENCOUNTER — APPOINTMENT (OUTPATIENT)
Dept: PLASTIC SURGERY | Facility: CLINIC | Age: 83
End: 2018-10-25

## 2018-10-25 VITALS
BODY MASS INDEX: 24.8 KG/M2 | DIASTOLIC BLOOD PRESSURE: 53 MMHG | SYSTOLIC BLOOD PRESSURE: 162 MMHG | WEIGHT: 140 LBS | HEART RATE: 64 BPM | RESPIRATION RATE: 16 BRPM | HEIGHT: 63 IN

## 2018-10-25 DIAGNOSIS — C43.61 MALIGNANT MELANOMA OF RIGHT UPPER LIMB, INCLUDING SHOULDER: ICD-10-CM

## 2019-01-24 ENCOUNTER — INPATIENT (INPATIENT)
Facility: HOSPITAL | Age: 84
LOS: 0 days | Discharge: HOME | DRG: 246 | End: 2019-01-25
Attending: INTERNAL MEDICINE | Admitting: INTERNAL MEDICINE
Payer: MEDICARE

## 2019-01-24 ENCOUNTER — TRANSCRIPTION ENCOUNTER (OUTPATIENT)
Age: 84
End: 2019-01-24

## 2019-01-24 VITALS
HEIGHT: 64 IN | HEART RATE: 68 BPM | TEMPERATURE: 98 F | WEIGHT: 143.08 LBS | OXYGEN SATURATION: 98 % | RESPIRATION RATE: 18 BRPM | SYSTOLIC BLOOD PRESSURE: 143 MMHG | DIASTOLIC BLOOD PRESSURE: 61 MMHG

## 2019-01-24 DIAGNOSIS — C43.59 MALIGNANT MELANOMA OF OTHER PART OF TRUNK: Chronic | ICD-10-CM

## 2019-01-24 DIAGNOSIS — N18.6 END STAGE RENAL DISEASE: ICD-10-CM

## 2019-01-24 DIAGNOSIS — N18.9 CHRONIC KIDNEY DISEASE, UNSPECIFIED: ICD-10-CM

## 2019-01-24 DIAGNOSIS — I10 ESSENTIAL (PRIMARY) HYPERTENSION: ICD-10-CM

## 2019-01-24 DIAGNOSIS — I25.10 ATHEROSCLEROTIC HEART DISEASE OF NATIVE CORONARY ARTERY WITHOUT ANGINA PECTORIS: Chronic | ICD-10-CM

## 2019-01-24 DIAGNOSIS — I25.10 ATHEROSCLEROTIC HEART DISEASE OF NATIVE CORONARY ARTERY WITHOUT ANGINA PECTORIS: ICD-10-CM

## 2019-01-24 DIAGNOSIS — I77.0 ARTERIOVENOUS FISTULA, ACQUIRED: Chronic | ICD-10-CM

## 2019-01-24 LAB
ALBUMIN SERPL ELPH-MCNC: 4.5 G/DL — SIGNIFICANT CHANGE UP (ref 3.3–5)
ALP SERPL-CCNC: 82 U/L — SIGNIFICANT CHANGE UP (ref 40–120)
ALT FLD-CCNC: 15 U/L — SIGNIFICANT CHANGE UP (ref 10–45)
ANION GAP SERPL CALC-SCNC: 15 MMOL/L — SIGNIFICANT CHANGE UP (ref 5–17)
AST SERPL-CCNC: 19 U/L — SIGNIFICANT CHANGE UP (ref 10–40)
BILIRUB SERPL-MCNC: 0.4 MG/DL — SIGNIFICANT CHANGE UP (ref 0.2–1.2)
BUN SERPL-MCNC: 37 MG/DL — HIGH (ref 7–23)
CALCIUM SERPL-MCNC: 10.1 MG/DL — SIGNIFICANT CHANGE UP (ref 8.4–10.5)
CHLORIDE SERPL-SCNC: 94 MMOL/L — LOW (ref 96–108)
CO2 SERPL-SCNC: 31 MMOL/L — SIGNIFICANT CHANGE UP (ref 22–31)
CREAT SERPL-MCNC: 6.02 MG/DL — HIGH (ref 0.5–1.3)
GLUCOSE SERPL-MCNC: 95 MG/DL — SIGNIFICANT CHANGE UP (ref 70–99)
HCT VFR BLD CALC: 42.1 % — SIGNIFICANT CHANGE UP (ref 39–50)
HGB BLD-MCNC: 14.5 G/DL — SIGNIFICANT CHANGE UP (ref 13–17)
MCHC RBC-ENTMCNC: 34.2 PG — HIGH (ref 27–34)
MCHC RBC-ENTMCNC: 34.4 GM/DL — SIGNIFICANT CHANGE UP (ref 32–36)
MCV RBC AUTO: 99.3 FL — SIGNIFICANT CHANGE UP (ref 80–100)
PLATELET # BLD AUTO: 272 K/UL — SIGNIFICANT CHANGE UP (ref 150–400)
POTASSIUM SERPL-MCNC: 4.5 MMOL/L — SIGNIFICANT CHANGE UP (ref 3.5–5.3)
POTASSIUM SERPL-SCNC: 4.5 MMOL/L — SIGNIFICANT CHANGE UP (ref 3.5–5.3)
PROT SERPL-MCNC: 7.9 G/DL — SIGNIFICANT CHANGE UP (ref 6–8.3)
RBC # BLD: 4.24 M/UL — SIGNIFICANT CHANGE UP (ref 4.2–5.8)
RBC # FLD: 13.5 % — SIGNIFICANT CHANGE UP (ref 10.3–14.5)
SODIUM SERPL-SCNC: 140 MMOL/L — SIGNIFICANT CHANGE UP (ref 135–145)
WBC # BLD: 8.9 K/UL — SIGNIFICANT CHANGE UP (ref 3.8–10.5)
WBC # FLD AUTO: 8.9 K/UL — SIGNIFICANT CHANGE UP (ref 3.8–10.5)

## 2019-01-24 PROCEDURE — 99152 MOD SED SAME PHYS/QHP 5/>YRS: CPT | Mod: GC

## 2019-01-24 PROCEDURE — 93458 L HRT ARTERY/VENTRICLE ANGIO: CPT | Mod: 26,59,GC

## 2019-01-24 PROCEDURE — 92928 PRQ TCAT PLMT NTRAC ST 1 LES: CPT | Mod: LD,GC

## 2019-01-24 PROCEDURE — 92978 ENDOLUMINL IVUS OCT C 1ST: CPT | Mod: 26,RC,GC

## 2019-01-24 PROCEDURE — 93010 ELECTROCARDIOGRAM REPORT: CPT

## 2019-01-24 PROCEDURE — 93010 ELECTROCARDIOGRAM REPORT: CPT | Mod: 77

## 2019-01-24 RX ORDER — SUCRALFATE 1 G
1 TABLET ORAL
Qty: 0 | Refills: 0 | COMMUNITY

## 2019-01-24 RX ORDER — SODIUM CHLORIDE 9 MG/ML
3 INJECTION INTRAMUSCULAR; INTRAVENOUS; SUBCUTANEOUS EVERY 8 HOURS
Qty: 0 | Refills: 0 | Status: DISCONTINUED | OUTPATIENT
Start: 2019-01-24 | End: 2019-01-25

## 2019-01-24 RX ORDER — MULTIVIT-MIN/FERROUS GLUCONATE 9 MG/15 ML
1 LIQUID (ML) ORAL DAILY
Qty: 0 | Refills: 0 | Status: DISCONTINUED | OUTPATIENT
Start: 2019-01-24 | End: 2019-01-25

## 2019-01-24 RX ORDER — CALCIUM ACETATE 667 MG
1334 TABLET ORAL
Qty: 0 | Refills: 0 | Status: DISCONTINUED | OUTPATIENT
Start: 2019-01-24 | End: 2019-01-25

## 2019-01-24 RX ORDER — PREGABALIN 225 MG/1
1000 CAPSULE ORAL
Qty: 0 | Refills: 0 | COMMUNITY

## 2019-01-24 RX ORDER — LATANOPROST 0.05 MG/ML
1 SOLUTION/ DROPS OPHTHALMIC; TOPICAL AT BEDTIME
Qty: 0 | Refills: 0 | Status: DISCONTINUED | OUTPATIENT
Start: 2019-01-24 | End: 2019-01-25

## 2019-01-24 RX ORDER — ATORVASTATIN CALCIUM 80 MG/1
1 TABLET, FILM COATED ORAL
Qty: 30 | Refills: 11
Start: 2019-01-24 | End: 2020-01-18

## 2019-01-24 RX ORDER — ASPIRIN/CALCIUM CARB/MAGNESIUM 324 MG
81 TABLET ORAL DAILY
Qty: 0 | Refills: 0 | Status: DISCONTINUED | OUTPATIENT
Start: 2019-01-24 | End: 2019-01-25

## 2019-01-24 RX ORDER — ATORVASTATIN CALCIUM 80 MG/1
20 TABLET, FILM COATED ORAL AT BEDTIME
Qty: 0 | Refills: 0 | Status: DISCONTINUED | OUTPATIENT
Start: 2019-01-24 | End: 2019-01-25

## 2019-01-24 RX ORDER — CLOPIDOGREL BISULFATE 75 MG/1
75 TABLET, FILM COATED ORAL DAILY
Qty: 0 | Refills: 0 | Status: DISCONTINUED | OUTPATIENT
Start: 2019-01-24 | End: 2019-01-25

## 2019-01-24 RX ORDER — METOPROLOL TARTRATE 50 MG
1 TABLET ORAL
Qty: 0 | Refills: 0 | COMMUNITY

## 2019-01-24 RX ORDER — ZALEPLON 10 MG
5 CAPSULE ORAL ONCE
Qty: 0 | Refills: 0 | Status: DISCONTINUED | OUTPATIENT
Start: 2019-01-24 | End: 2019-01-24

## 2019-01-24 RX ORDER — CLOPIDOGREL BISULFATE 75 MG/1
1 TABLET, FILM COATED ORAL
Qty: 30 | Refills: 11
Start: 2019-01-24 | End: 2020-01-18

## 2019-01-24 RX ORDER — CLOPIDOGREL BISULFATE 75 MG/1
1 TABLET, FILM COATED ORAL
Qty: 0 | Refills: 0 | COMMUNITY

## 2019-01-24 RX ORDER — ACETAMINOPHEN 500 MG
650 TABLET ORAL ONCE
Qty: 0 | Refills: 0 | Status: COMPLETED | OUTPATIENT
Start: 2019-01-24 | End: 2019-01-24

## 2019-01-24 RX ORDER — METOPROLOL TARTRATE 50 MG
50 TABLET ORAL
Qty: 0 | Refills: 0 | Status: DISCONTINUED | OUTPATIENT
Start: 2019-01-24 | End: 2019-01-25

## 2019-01-24 RX ADMIN — Medication 650 MILLIGRAM(S): at 19:29

## 2019-01-24 RX ADMIN — Medication 50 MILLIGRAM(S): at 19:33

## 2019-01-24 RX ADMIN — Medication 1 TABLET(S): at 14:33

## 2019-01-24 RX ADMIN — CLOPIDOGREL BISULFATE 75 MILLIGRAM(S): 75 TABLET, FILM COATED ORAL at 13:27

## 2019-01-24 RX ADMIN — ATORVASTATIN CALCIUM 20 MILLIGRAM(S): 80 TABLET, FILM COATED ORAL at 21:17

## 2019-01-24 RX ADMIN — Medication 650 MILLIGRAM(S): at 20:00

## 2019-01-24 RX ADMIN — Medication 81 MILLIGRAM(S): at 13:26

## 2019-01-24 RX ADMIN — SODIUM CHLORIDE 3 MILLILITER(S): 9 INJECTION INTRAMUSCULAR; INTRAVENOUS; SUBCUTANEOUS at 21:17

## 2019-01-24 RX ADMIN — Medication 1334 MILLIGRAM(S): at 17:38

## 2019-01-24 RX ADMIN — LATANOPROST 1 DROP(S): 0.05 SOLUTION/ DROPS OPHTHALMIC; TOPICAL at 21:22

## 2019-01-24 RX ADMIN — Medication 5 MILLIGRAM(S): at 21:22

## 2019-01-24 RX ADMIN — Medication 1334 MILLIGRAM(S): at 14:31

## 2019-01-24 RX ADMIN — SODIUM CHLORIDE 3 MILLILITER(S): 9 INJECTION INTRAMUSCULAR; INTRAVENOUS; SUBCUTANEOUS at 13:29

## 2019-01-24 NOTE — DISCHARGE NOTE ADULT - CARE PLAN
Principal Discharge DX:	CAD (coronary artery disease)  Goal:	Pt remains chest pain free and understands post cath discharge instructions  Assessment and plan of treatment:	No heavy lifting or pushing/pulling with procedure arm for 2 weeks. No driving for 2 days. You may shower 24 hours following the procedure but avoid baths/swimming for 1 week. Check your wrist site for bleeding and/or swelling daily following procedure and call your doctor immediately if it occurs or if you experience increased pain at the site. Follow up with your cardiologist in 1-2 weeks. You may call [facility] if you have any questions/concerns regarding your procedure [phone].  Secondary Diagnosis:	Hypertension  Goal:	Your blood pressure will be controlled.  Assessment and plan of treatment:	Continue with your blood pressure medications; eat a heart healthy diet with low salt diet; exercise regularly (consult with your physician or cardiologist first); maintain a heart healthy weight; if you smoke - quit (A resource to help you stop smoking is the [facility] Tobacco Control – phone number [phone].); include healthy ways to manage stress. Continue to follow with your primary care physician or cardiologist. Principal Discharge DX:	CAD (coronary artery disease)  Goal:	Pt remains chest pain free and understands post cath discharge instructions  Assessment and plan of treatment:	No heavy lifting or pushing/pulling with procedure arm for 2 weeks. No driving for 2 days. You may shower 24 hours following the procedure but avoid baths/swimming for 1 week. Check your wrist site for bleeding and/or swelling daily following procedure and call your doctor immediately if it occurs or if you experience increased pain at the site. Follow up with your cardiologist in 1-2 weeks. You may call [facility] if you have any questions/concerns regarding your procedure [phone].  Secondary Diagnosis:	Hypertension  Goal:	Your blood pressure will be controlled.  Assessment and plan of treatment:	Continue with your blood pressure medications; eat a heart healthy diet with low salt diet; exercise regularly (consult with your physician or cardiologist first); maintain a heart healthy weight; if you smoke - quit (A resource to help you stop smoking is the [facility] Tobacco Control – phone number [phone].); include healthy ways to manage stress. Continue to follow with your primary care physician or cardiologist.  Secondary Diagnosis:	ESRD (end stage renal disease) on dialysis  Goal:	You will tolerate Dialysis  Assessment and plan of treatment:	You had dialysis today.  Resume your home schedule  Followup with your Kidney doctor as scheduled

## 2019-01-24 NOTE — DISCHARGE NOTE ADULT - PATIENT PORTAL LINK FT
You can access the [facility] Patient Portal, offered by [facility], by registering with the following website: [email or web address]

## 2019-01-24 NOTE — DISCHARGE NOTE ADULT - MEDICATION SUMMARY - MEDICATIONS TO TAKE
I will START or STAY ON the medications listed below when I get home from the hospital:    aspirin 81 mg oral tablet  -- 1 tab(s) by mouth once a day  -- Indication: For TO KEEP STENT OPEN    atorvastatin 20 mg oral tablet  -- 1 tab(s) by mouth once a day (at bedtime)  -- Indication: For High cholesterol    clopidogrel 75 mg oral tablet  -- 1 tab(s) by mouth once a day  -- Indication: For TO KEEP STENT OPEN    Metoprolol Tartrate 50 mg oral tablet  -- 1 tab(s) by mouth 2 times a day  -- Indication: For High blood pressure     latanoprost 0.005% ophthalmic solution  -- 1 drop(s) to each affected eye once a day (in the evening)  -- Indication: For glaucoma    calcium acetate 667 mg oral tablet  -- 2 tab(s) by mouth 3 times a day  -- Indication: For kidney function     Auryxia 210 mg oral tablet  -- 1 tab(s) by mouth 3 times a day (with meals) and snacks  -- Indication: For iron deficiency anemia     Joelle-Arya oral tablet  -- 1 tab(s) by mouth once a day last dose 10/8/18  -- Indication: For kidney function     Multiple Vitamins with Minerals oral tablet  -- 1 tab(s) by mouth once a day  -- Indication: For Supplement

## 2019-01-24 NOTE — H&P CARDIOLOGY - PSH
AV fistula AV fistula  2016  CAD (coronary artery disease)  x2 cardiac stent, 1st stent placed in 6/7/18 and 6/20/18 at [facility]  Malignant melanoma of back  removal in 10/2018

## 2019-01-24 NOTE — DISCHARGE NOTE ADULT - HOSPITAL COURSE
82 y/o Telugu male pt PMH x of HTN, HLD, CAD-Recent Stent to RCA (6/8/18) and D1 (6/20/2018), ESRD on HD - MWF via LUE AVF (last HD 1/123/189 @ [facility] in [place], nephrologist Dr. Schulz), BPH, urinary outlet obstruction, Self Cath 2 x day, recent melanoma removal from upper back (10/2018) presents for cardiac cath. Pt reports he has been feeling chest pain, across chest radiated left arm and fatigue for past few months, evaluated by a cardiologist, had chest pain while stress testing yesterday, now here for cath. Pt is now s/p cardiac cath ALBERT 1 D1 and ALBERT x 2 pRCA/dRCA via right radial artery access. Pt tolerated the procedure well, site benign. Post-procedure discharge instructions discussed and questions addressed 84 y/o Uz[place] male pt PMH x of HTN, HLD, CAD-Recent Stent to RCA (6/8/18) and D1 (6/20/2018), ESRD on HD - MWF via LUE AVF (last HD 1/123/189 @ [facility] in [place], nephrologist Dr. Schulz), BPH, urinary outlet obstruction, Self Cath 2 x day, recent melanoma removal from upper back (10/2018) presents for cardiac cath. Pt reports he has been feeling chest pain, across chest radiated left arm and fatigue for past few months, evaluated by a cardiologist, had chest pain while stress testing yesterday, now here for cath. Pt is now s/p cardiac cath ALBERT 1 D1 and ALBERT x 2 pRCA/dRCA via right radial artery access. Pt tolerated the procedure well, site benign. Post-procedure discharge instructions discussed and questions addressed.  {t s[ HD tolerated well home HD reinstated by case management pt stable for d/c home with CE flat.

## 2019-01-24 NOTE — DISCHARGE NOTE ADULT - PLAN OF CARE
Pt remains chest pain free and understands post cath discharge instructions No heavy lifting or pushing/pulling with procedure arm for 2 weeks. No driving for 2 days. You may shower 24 hours following the procedure but avoid baths/swimming for 1 week. Check your wrist site for bleeding and/or swelling daily following procedure and call your doctor immediately if it occurs or if you experience increased pain at the site. Follow up with your cardiologist in 1-2 weeks. You may call [facility] if you have any questions/concerns regarding your procedure [phone]. Your blood pressure will be controlled. Continue with your blood pressure medications; eat a heart healthy diet with low salt diet; exercise regularly (consult with your physician or cardiologist first); maintain a heart healthy weight; if you smoke - quit (A resource to help you stop smoking is the [facility] Tobacco [facility] – phone number [phone].); include healthy ways to manage stress. Continue to follow with your primary care physician or cardiologist. You will tolerate Dialysis You had dialysis today.  Resume your home schedule  Followup with your Kidney doctor as scheduled

## 2019-01-24 NOTE — H&P CARDIOLOGY - PMH
BPH (benign prostatic hyperplasia)    CAD (coronary artery disease)    Cystitis    ESRD (end stage renal disease) on dialysis    Glaucoma    Hypertension    Malignant melanoma of right upper limb, including shoulder    NSTEMI (non-ST elevated myocardial infarction)    Stented coronary artery Bladder obstruction  with UTI in 6/2018  BPH (benign prostatic hyperplasia)    CAD (coronary artery disease)    Cystitis    ESRD (end stage renal disease) on dialysis    Glaucoma    Hypertension    Malignant melanoma of right upper limb, including shoulder    NSTEMI (non-ST elevated myocardial infarction)    Stented coronary artery

## 2019-01-24 NOTE — H&P CARDIOLOGY - HISTORY OF PRESENT ILLNESS
84 y/o Uz[place] male pt PMH x of HTN, HLD, CAD-Recent Stent to RCA (6/8/18) and D1 (6/20/2018), ESRD on HD - MWF via LUE AVF (last HD 1/123/189 @ [facility] in [place], nephrologist Dr. Schulz), BPH, urinary outlet obstruction, Self Cath 2 x day presents for cardiac cath. Pt reports he has been feeling chest pain, across chest radiated left arm and fatigue for past few months, evaluated by a cardiologist, had chest pain while stress testing yesterday, now here for cath. No further chest pain after stress test yesterday.   Renal-Dr Steinberg  < from: Cardiac Cath Lab - Adult (06.20.18 @ 08:05) >  LM:   --  LM: Normal.  LAD:   --  Mid LAD: There was a 30 % stenosis.  --  D1: There was a diffuse 90 % stenosis. The lesion was heavily calcified.  CX:   --  Circumflex: This vessel was not injected, but was visualized during a prior cardiac catheterization.  RCA:   --  RCA: This vessel was not injected, but was visualized during a prior cardiac catheterization.  1. Successful rotational atherectomy (1.5mm anna) followed by 2.5mm Resolute [place] ALBERT to the D1 in the setting of recurrent rest angina.  2. DAPT. < end of copied text >  < from: Cardiac Cath Lab - Adult (06.08.18 @ 10:16) >  VENTRICLES: No left ventriculogram was performed. No LV gram was performed; however, a recent echocardiogram demonstrated normal global and regional LV function.  CORONARY VESSELS: The coronary circulation is right dominant.  LM:   --  LM: This vessel was not injected, but was visualized during a prior cardiac catheterization.  LAD:   --  LAD: This vessel was notinjected, but was visualized during a prior cardiac catheterization.  CX:   --  Circumflex: This vessel was not injected, but was visualized during a prior cardiac catheterization.  RCA:   --  Proximal RCA: There was a diffuse 95 % stenosis. The lesion was heavily calcified. There was KATLYN grade 3 flow through the vessel (brisk flow).  --  Mid RCA: There was a diffuse 95 % stenosis. The lesion was heavily calcified. There was KATLYN grade 3 flow through the vessel (brisk flow). This is a likely culprit for the patient's clinical presentation.  INTERVENTIONAL RECOMMENDATIONS:  1. Successful orbital atherectomy (1.25mm anna) to the pRCA and mRCA followed by PCI (3.5mm and 3.5mm Resolute [place] ALBERT) in the setting of angina.  2. DAPT. 3. Given renal function and complexity will stage the D1 PCI. 82 y/o Serbian male pt PMH x of HTN, HLD, CAD-Recent Stent to RCA (6/8/18) and D1 (6/20/2018), ESRD on HD - MWF via LUE AVF (last HD 1/123/189 @ [facility] in [place], nephrologist Dr. Schulz), BPH, urinary outlet obstruction, Self Cath 2 x day, recent melanoma removal from upper back (10/2018) presents for cardiac cath. Pt reports he has been feeling chest pain, across chest radiated left arm and fatigue for past few months, evaluated by a cardiologist, had chest pain while stress testing yesterday, now here for cath. No further chest pain after stress test yesterday.   Renal-Dr Steinberg  < from: Cardiac Cath Lab - Adult (06.20.18 @ 08:05) >  LM:   --  LM: Normal.  LAD:   --  Mid LAD: There was a 30 % stenosis.  --  D1: There was a diffuse 90 % stenosis. The lesion was heavily calcified.  CX:   --  Circumflex: This vessel was not injected, but was visualized during a prior cardiac catheterization.  RCA:   --  RCA: This vessel was not injected, but was visualized during a prior cardiac catheterization.  1. Successful rotational atherectomy (1.5mm anna) followed by 2.5mm Resolute [place] ALBERT to the D1 in the setting of recurrent rest angina.  2. DAPT. < end of copied text >  < from: Cardiac Cath Lab - Adult (06.08.18 @ 10:16) >  VENTRICLES: No left ventriculogram was performed. No LV gram was performed; however, a recent echocardiogram demonstrated normal global and regional LV function.  CORONARY VESSELS: The coronary circulation is right dominant.  LM:   --  LM: This vessel was not injected, but was visualized during a prior cardiac catheterization.  LAD:   --  LAD: This vessel was notinjected, but was visualized during a prior cardiac catheterization.  CX:   --  Circumflex: This vessel was not injected, but was visualized during a prior cardiac catheterization.  RCA:   --  Proximal RCA: There was a diffuse 95 % stenosis. The lesion was heavily calcified. There was KATLYN grade 3 flow through the vessel (brisk flow).  --  Mid RCA: There was a diffuse 95 % stenosis. The lesion was heavily calcified. There was KATLYN grade 3 flow through the vessel (brisk flow). This is a likely culprit for the patient's clinical presentation.  INTERVENTIONAL RECOMMENDATIONS:  1. Successful orbital atherectomy (1.25mm anna) to the pRCA and mRCA followed by PCI (3.5mm and 3.5mm Resolute [place] ALBERT) in the setting of angina.  2. DAPT. 3. Given renal function and complexity will stage the D1 PCI.

## 2019-01-24 NOTE — DISCHARGE NOTE ADULT - ADDITIONAL INSTRUCTIONS
Do not stop you Aspirin or Plavix unless instructed to do so by your cardiologist.   Follow-up with your cardiologist in 1-2 weeks

## 2019-01-24 NOTE — CONSULT NOTE ADULT - ASSESSMENT
82 y/o Luxembourgish male pt PMH x of HTN, HLD, CAD-Recent Stent to RCA (6/8/18) and D1 (6/20/2018), ESRD on HD - had cp now admitted underwent coronary angio with stent placement.     1- esrd  2- shpt   3- hyperlipidemia    hd consent obtained witnessed and placed in chart  hd in am   cont phoslo 2 with each meal  asa/plavix for cad/pci  cont lipitor 20 mg daily   d/w cards team when seen

## 2019-01-24 NOTE — DISCHARGE NOTE ADULT - CARE PROVIDER_API CALL
Leanne Hodge), Cardiology; Internal Medicine; Interventional Cardiology  [facility] Dept of Cardiology  [address] [place] [address]  Phone: [phone]  Fax: [phone]

## 2019-01-24 NOTE — CONSULT NOTE ADULT - SUBJECTIVE AND OBJECTIVE BOX
[facility] DIVISION OF KIDNEY DISEASES AND HYPERTENSION -- INITIAL CONSULT NOTE  --------------------------------------------------------------------------------  HPI:  82 y/o man with PMH  of HTN, HLD, CAD with stents, ESRD on HD,  BPH, urinary outlet obstruction (self catheterizes 2 x day) , recent melanoma excision from upper back (10/2018) presents for cardiac cath.  Nephrology consulted for continuation of inpatient HD. Pt get dialysis on [facility] schedule via [facility] [employer] in [place]. His nephrologist Dr. Schulz. Last HD was on 1/23.    PAST HISTORY  --------------------------------------------------------------------------------  PAST MEDICAL & SURGICAL HISTORY:  Bladder obstruction: with UTI in 6/2018  Stented coronary artery  Hypertension  Malignant melanoma of right upper limb, including shoulder  Glaucoma  Cystitis  NSTEMI (non-ST elevated myocardial infarction)  CAD (coronary artery disease)  ESRD (end stage renal disease) on dialysis  BPH (benign prostatic hyperplasia)  Malignant melanoma of back: removal in 10/2018  CAD (coronary artery disease): x2 cardiac stent, 1st stent placed in 6/7/18 and 6/20/18 at [facility]  AV fistula: 2016    FAMILY HISTORY:  No pertinent family history in first degree relatives    PAST SOCIAL HISTORY:    ALLERGIES & MEDICATIONS  --------------------------------------------------------------------------------  Allergies    No Known Allergies    Intolerances      Standing Inpatient Medications  aspirin enteric coated 81 milliGRAM(s) Oral daily  atorvastatin 20 milliGRAM(s) Oral at bedtime  calcium acetate 1334 milliGRAM(s) Oral three times a day with meals  clopidogrel Tablet 75 milliGRAM(s) Oral daily  latanoprost 0.005% Ophthalmic Solution 1 Drop(s) Both EYES at bedtime  metoprolol tartrate 50 milliGRAM(s) Oral two times a day  multivitamin/minerals 1 Tablet(s) Oral daily  sodium chloride 0.9% lock flush 3 milliLiter(s) IV Push every 8 hours    PRN Inpatient Medications      REVIEW OF SYSTEMS  --------------------------------------------------------------------------------  Gen: No weight changes, fatigue, fevers/chills, weakness  Skin: No rashes  Head/Eyes/Ears/Mouth: No headache; Normal hearing; Normal vision w/o blurriness; No sinus pain/discomfort, sore throat  Respiratory: No dyspnea, cough, wheezing, hemoptysis  CV: No chest pain, PND, orthopnea  GI: No abdominal pain, diarrhea, constipation, nausea, vomiting, melena, hematochezia  : No increased frequency, dysuria, hematuria, nocturia  MSK: No joint pain/swelling; no back pain; no edema  Neuro: No dizziness/lightheadedness, weakness, seizures, numbness, tingling  Heme: No easy bruising or bleeding  Endo: No heat/cold intolerance  Psych: No significant nervousness, anxiety, stress, depression    All other systems were reviewed and are negative, except as noted.    VITALS/PHYSICAL EXAM  --------------------------------------------------------------------------------  T(C): 36.6 (01-24-19 @ 12:45), Max: 36.8 (01-24-19 @ 07:41)  HR: 72 (01-24-19 @ 15:45) (68 - 77)  BP: 155/52 (01-24-19 @ 15:45) (135/73 - 156/84)  RR: 16 (01-24-19 @ 15:45) (16 - 18)  SpO2: 96% (01-24-19 @ 15:45) (95% - 100%)  Wt(kg): --  Height (cm): 162.56 (01-24-19 @ 12:45)  Weight (kg): 64.9 (01-24-19 @ 12:45)  BMI (kg/m2): 24.6 (01-24-19 @ 12:45)  BSA (m2): 1.7 (01-24-19 @ 12:45)      01-24-19 @ 07:01  -  01-24-19 @ 16:10  --------------------------------------------------------  IN: 240 mL / OUT: 0 mL / NET: 240 mL      Physical Exam:  	Gen: NAD, well-appearing  	HEENT: PERRL, supple neck, clear oropharynx  	Pulm: CTA B/L  	CV: RRR, S1S2; no rub  	Back: No spinal or CVA tenderness; no sacral edema  	Abd: +BS, soft, nontender/nondistended  	: No suprapubic tenderness  	UE: Warm, FROM, no clubbing, intact strength; no edema; no asterixis  	LE: Warm, FROM, no clubbing, intact strength; no edema  	Neuro: No focal deficits, intact gait  	Psych: Normal affect and mood  	Skin: Warm, without rashes  	Vascular access:    LABS/STUDIES  --------------------------------------------------------------------------------              14.5   8.9   >-----------<  272      [01-24-19 @ 08:10]              42.1     140  |  94  |  37  ----------------------------<  95      [01-24-19 @ 08:10]  4.5   |  31  |  6.02        Ca     10.1     [01-24-19 @ 08:10]    TPro  7.9  /  Alb  4.5  /  TBili  0.4  /  DBili  x   /  AST  19  /  ALT  15  /  AlkPhos  82  [01-24-19 @ 08:10]          Creatinine Trend:  SCr 6.02 [01-24 @ 08:10]    Urinalysis - [06-07-18 @ 07:23]      Color Yellow / Appearance Clear / SG 1.012 / pH >9.0      Gluc 100 / Ketone Negative  / Bili Negative / Urobili Negative       Blood Small / Protein 300 / Leuk Est Moderate / Nitrite Negative      RBC 5-10 / WBC >50 / Hyaline  / Gran  / Sq Epi  / Non Sq Epi Few / Bacteria       HbA1c 5.0      [06-07-18 @ 11:06]  TSH 2.15      [06-07-18 @ 08:03]    HBsAb <3.0      [06-08-18 @ 22:10]  HBsAg Nonreact      [06-08-18 @ 22:10]  HBcAb Nonreact      [06-08-18 @ 22:10]  HCV 0.20, Nonreact      [06-08-18 @ 22:10]

## 2019-01-24 NOTE — CONSULT NOTE ADULT - SUBJECTIVE AND OBJECTIVE BOX
[facility]  [phone]  NEPHROLOGY      INITIAL CONSULT NOTE  --------------------------------------------------------------------------------  HPI:    82 y/o [place] male pt PMH x of HTN, HLD, CAD-Recent Stent to RCA (6/8/18) and D1 (6/20/2018), ESRD on HD - MWF via LUE AVF (last HD 1/123/189 @ [facility] in [place], nephrologist Dr. Schulz), BPH, urinary outlet obstruction, Self Cath 2 x day, recent melanoma removal from upper back (10/2018) had cp now admitted underwent coronary angio with stent placement.   renal consult called     PAST HISTORY  --------------------------------------------------------------------------------  PAST MEDICAL & SURGICAL HISTORY:  Bladder obstruction: with UTI in 6/2018  Stented coronary artery  Hypertension  Malignant melanoma of right upper limb, including shoulder  Glaucoma  Cystitis  NSTEMI (non-ST elevated myocardial infarction)  CAD (coronary artery disease)  ESRD (end stage renal disease) on dialysis  BPH (benign prostatic hyperplasia)  Malignant melanoma of back: removal in 10/2018  CAD (coronary artery disease): x2 cardiac stent, 1st stent placed in 6/7/18 and 6/20/18 at [facility]  AV fistula: 2016    FAMILY HISTORY:  No pertinent family history in first degree relatives    PAST SOCIAL HISTORY:   no tobacco use   ALLERGIES & MEDICATIONS  --------------------------------------------------------------------------------  Allergies    No Known Allergies    Intolerances      Standing Inpatient Medications  aspirin enteric coated 81 milliGRAM(s) Oral daily  atorvastatin 20 milliGRAM(s) Oral at bedtime  calcium acetate 1334 milliGRAM(s) Oral three times a day with meals  clopidogrel Tablet 75 milliGRAM(s) Oral daily  latanoprost 0.005% Ophthalmic Solution 1 Drop(s) Both EYES at bedtime  metoprolol tartrate 50 milliGRAM(s) Oral two times a day  multivitamin/minerals 1 Tablet(s) Oral daily  sodium chloride 0.9% lock flush 3 milliLiter(s) IV Push every 8 hours    PRN Inpatient Medications      REVIEW OF SYSTEMS  --------------------------------------------------------------------------------  Gen: No  fevers/chills   Skin: No rashes  Head/Eyes/Ears/Mouth: No headache; Normal hearing;  No sinus pain/discomfort, sore throat  Respiratory: No dyspnea, cough, wheezing, hemoptysis  CV: dissipated  chest pain, or palp   GI: No abdominal pain, diarrhea, nausea, vomiting, melena, hematochezia  : No dysuria, decrease urination/ hesitancy urinating  hematuria,  + self cath   MSK: No joint pain/swelling; no back pain  Neuro: No dizziness/lightheadedness,   also with no edema     All other systems were reviewed and are negative, except as noted.    VITALS/PHYSICAL EXAM  --------------------------------------------------------------------------------  T(C): 36.9 (01-24-19 @ 19:45), Max: 36.9 (01-24-19 @ 19:45)  HR: 71 (01-24-19 @ 19:45) (68 - 77)  BP: 158/55 (01-24-19 @ 19:45) (135/73 - 188/89)  RR: 17 (01-24-19 @ 19:45) (16 - 18)  SpO2: 98% (01-24-19 @ 19:45) (95% - 100%)  Wt(kg): --  Height (cm): 162.56 (01-24-19 @ 12:45)  Weight (kg): 64.9 (01-24-19 @ 12:45)  BMI (kg/m2): 24.6 (01-24-19 @ 12:45)  BSA (m2): 1.7 (01-24-19 @ 12:45)      01-24-19 @ 07:01  -  01-24-19 @ 22:53  --------------------------------------------------------  IN: 720 mL / OUT: 0 mL / NET: 720 mL      Physical Exam:  	Gen: Non toxic comfortable appearing   	no jvd ,   	Pulm: decrease bs  no rales or ronchi or wheezing  	CV: RRR, S1S2; no rub  	Back: No CVA tenderness  	Abd: +BS, soft, nontender/nondistended  	: No suprapubic tenderness  	UE: Warm, no cyanosis  no clubbing,  no edema; no asterixis  	LE: Warm, no cyanosis  no clubbing, no edema  	Neuro: alert and oriented. speech coherent   	Psych: Normal affect and mood  	Skin: Warm, no decrease skin turgor   	Vascular access: LUE avf + bruit and thrill     LABS/STUDIES  --------------------------------------------------------------------------------              14.5   8.9   >-----------<  272      [01-24-19 @ 08:10]              42.1     140  |  94  |  37  ----------------------------<  95      [01-24-19 @ 08:10]  4.5   |  31  |  6.02        Ca     10.1     [01-24-19 @ 08:10]    TPro  7.9  /  Alb  4.5  /  TBili  0.4  /  DBili  x   /  AST  19  /  ALT  15  /  AlkPhos  82  [01-24-19 @ 08:10]          Creatinine Trend:  SCr 6.02 [01-24 @ 08:10]    Urinalysis - [06-07-18 @ 07:23]      Color Yellow / Appearance Clear / SG 1.012 / pH >9.0      Gluc 100 / Ketone Negative  / Bili Negative / Urobili Negative       Blood Small / Protein 300 / Leuk Est Moderate / Nitrite Negative      RBC 5-10 / WBC >50 / Hyaline  / Gran  / Sq Epi  / Non Sq Epi Few / Bacteria       HbA1c 5.0      [06-07-18 @ 11:06]  TSH 2.15      [06-07-18 @ 08:03]    HBsAb <3.0      [06-08-18 @ 22:10]  HBsAg Nonreact      [06-08-18 @ 22:10]  HBcAb Nonreact      [06-08-18 @ 22:10]  HCV 0.20, Nonreact      [06-08-18 @ 22:10]

## 2019-01-25 VITALS
OXYGEN SATURATION: 98 % | DIASTOLIC BLOOD PRESSURE: 77 MMHG | RESPIRATION RATE: 18 BRPM | TEMPERATURE: 98 F | SYSTOLIC BLOOD PRESSURE: 141 MMHG | HEART RATE: 92 BPM

## 2019-01-25 LAB
ANION GAP SERPL CALC-SCNC: 18 MMOL/L — HIGH (ref 5–17)
BUN SERPL-MCNC: 48 MG/DL — HIGH (ref 7–23)
CALCIUM SERPL-MCNC: 9.3 MG/DL — SIGNIFICANT CHANGE UP (ref 8.4–10.5)
CHLORIDE SERPL-SCNC: 94 MMOL/L — LOW (ref 96–108)
CK MB BLD-MCNC: 6 % — HIGH (ref 0–3.5)
CK MB CFR SERPL CALC: 4.2 NG/ML — SIGNIFICANT CHANGE UP (ref 0–6.7)
CK SERPL-CCNC: 70 U/L — SIGNIFICANT CHANGE UP (ref 30–200)
CO2 SERPL-SCNC: 24 MMOL/L — SIGNIFICANT CHANGE UP (ref 22–31)
CREAT SERPL-MCNC: 7.08 MG/DL — HIGH (ref 0.5–1.3)
GLUCOSE SERPL-MCNC: 90 MG/DL — SIGNIFICANT CHANGE UP (ref 70–99)
HAV IGM SER-ACNC: SIGNIFICANT CHANGE UP
HBV CORE AB SER-ACNC: SIGNIFICANT CHANGE UP
HBV CORE IGM SER-ACNC: SIGNIFICANT CHANGE UP
HBV SURFACE AB SER-ACNC: <3 MIU/ML — LOW
HBV SURFACE AG SER-ACNC: SIGNIFICANT CHANGE UP
HCT VFR BLD CALC: 36.1 % — LOW (ref 39–50)
HCV AB S/CO SERPL IA: 0.13 S/CO — SIGNIFICANT CHANGE UP
HCV AB SERPL-IMP: SIGNIFICANT CHANGE UP
HGB BLD-MCNC: 12.8 G/DL — LOW (ref 13–17)
MCHC RBC-ENTMCNC: 34.8 PG — HIGH (ref 27–34)
MCHC RBC-ENTMCNC: 35.3 GM/DL — SIGNIFICANT CHANGE UP (ref 32–36)
MCV RBC AUTO: 98.5 FL — SIGNIFICANT CHANGE UP (ref 80–100)
PLATELET # BLD AUTO: 228 K/UL — SIGNIFICANT CHANGE UP (ref 150–400)
POTASSIUM SERPL-MCNC: 4.6 MMOL/L — SIGNIFICANT CHANGE UP (ref 3.5–5.3)
POTASSIUM SERPL-SCNC: 4.6 MMOL/L — SIGNIFICANT CHANGE UP (ref 3.5–5.3)
RBC # BLD: 3.67 M/UL — LOW (ref 4.2–5.8)
RBC # FLD: 13.7 % — SIGNIFICANT CHANGE UP (ref 10.3–14.5)
SODIUM SERPL-SCNC: 136 MMOL/L — SIGNIFICANT CHANGE UP (ref 135–145)
TROPONIN T, HIGH SENSITIVITY RESULT: 143 NG/L — HIGH (ref 0–51)
TROPONIN T, HIGH SENSITIVITY RESULT: 148 NG/L — HIGH (ref 0–51)
WBC # BLD: 9.7 K/UL — SIGNIFICANT CHANGE UP (ref 3.8–10.5)
WBC # FLD AUTO: 9.7 K/UL — SIGNIFICANT CHANGE UP (ref 3.8–10.5)

## 2019-01-25 PROCEDURE — 86706 HEP B SURFACE ANTIBODY: CPT

## 2019-01-25 PROCEDURE — 80048 BASIC METABOLIC PNL TOTAL CA: CPT

## 2019-01-25 PROCEDURE — 85027 COMPLETE CBC AUTOMATED: CPT

## 2019-01-25 PROCEDURE — C1894: CPT

## 2019-01-25 PROCEDURE — 86705 HEP B CORE ANTIBODY IGM: CPT

## 2019-01-25 PROCEDURE — 82553 CREATINE MB FRACTION: CPT

## 2019-01-25 PROCEDURE — 93005 ELECTROCARDIOGRAM TRACING: CPT

## 2019-01-25 PROCEDURE — 87340 HEPATITIS B SURFACE AG IA: CPT

## 2019-01-25 PROCEDURE — 86704 HEP B CORE ANTIBODY TOTAL: CPT

## 2019-01-25 PROCEDURE — C1887: CPT

## 2019-01-25 PROCEDURE — C1725: CPT

## 2019-01-25 PROCEDURE — 86709 HEPATITIS A IGM ANTIBODY: CPT

## 2019-01-25 PROCEDURE — 99261: CPT

## 2019-01-25 PROCEDURE — C1769: CPT

## 2019-01-25 PROCEDURE — 92978 ENDOLUMINL IVUS OCT C 1ST: CPT | Mod: RC

## 2019-01-25 PROCEDURE — 93458 L HRT ARTERY/VENTRICLE ANGIO: CPT | Mod: 59

## 2019-01-25 PROCEDURE — C9600: CPT | Mod: LD

## 2019-01-25 PROCEDURE — 99152 MOD SED SAME PHYS/QHP 5/>YRS: CPT

## 2019-01-25 PROCEDURE — 93010 ELECTROCARDIOGRAM REPORT: CPT

## 2019-01-25 PROCEDURE — 82550 ASSAY OF CK (CPK): CPT

## 2019-01-25 PROCEDURE — 86803 HEPATITIS C AB TEST: CPT

## 2019-01-25 PROCEDURE — C1874: CPT

## 2019-01-25 PROCEDURE — 99153 MOD SED SAME PHYS/QHP EA: CPT

## 2019-01-25 PROCEDURE — 84484 ASSAY OF TROPONIN QUANT: CPT

## 2019-01-25 PROCEDURE — C1753: CPT

## 2019-01-25 PROCEDURE — 80053 COMPREHEN METABOLIC PANEL: CPT

## 2019-01-25 RX ORDER — IBUPROFEN 200 MG
200 TABLET ORAL ONCE
Qty: 0 | Refills: 0 | Status: DISCONTINUED | OUTPATIENT
Start: 2019-01-25 | End: 2019-01-25

## 2019-01-25 RX ADMIN — Medication 81 MILLIGRAM(S): at 12:56

## 2019-01-25 RX ADMIN — Medication 1 TABLET(S): at 12:56

## 2019-01-25 RX ADMIN — Medication 1334 MILLIGRAM(S): at 12:56

## 2019-01-25 RX ADMIN — CLOPIDOGREL BISULFATE 75 MILLIGRAM(S): 75 TABLET, FILM COATED ORAL at 12:56

## 2019-01-25 RX ADMIN — SODIUM CHLORIDE 3 MILLILITER(S): 9 INJECTION INTRAMUSCULAR; INTRAVENOUS; SUBCUTANEOUS at 06:16

## 2019-01-25 RX ADMIN — SODIUM CHLORIDE 3 MILLILITER(S): 9 INJECTION INTRAMUSCULAR; INTRAVENOUS; SUBCUTANEOUS at 13:19

## 2019-01-25 NOTE — PROGRESS NOTE ADULT - ASSESSMENT
83y old  Male who presents with CAD (24 Jan 2019 19:03) now s/p cardiac cath ALBERT x 2 RCA and ALBERT x 1 D1 via right radial artery access. Pt tolerated the procedure well, cardiac cath site being post procedure discharge instructions discussed and questions addressed.

## 2019-01-25 NOTE — PROGRESS NOTE ADULT - SUBJECTIVE AND OBJECTIVE BOX
83y old  Male who presents with CAD (2019 19:03) now s/p cardiac cath ALBERT x 2 RCA and ALBERT x 1 D1 via right radial artery access          Allergies    No Known Allergies    Intolerances        Medications:  aspirin enteric coated 81 milliGRAM(s) Oral daily  atorvastatin 20 milliGRAM(s) Oral at bedtime  calcium acetate 1334 milliGRAM(s) Oral three times a day with meals  clopidogrel Tablet 75 milliGRAM(s) Oral daily  latanoprost 0.005% Ophthalmic Solution 1 Drop(s) Both EYES at bedtime  metoprolol tartrate 50 milliGRAM(s) Oral two times a day  multivitamin/minerals 1 Tablet(s) Oral daily  sodium chloride 0.9% lock flush 3 milliLiter(s) IV Push every 8 hours      Vitals:  T(C): 36.7 (19 @ 04:30), Max: 36.9 (19 @ 19:45)  HR: 74 (19 @ 04:30) (68 - 77)  BP: 137/58 (19 @ 04:30) (135/73 - 188/89)  BP(mean): 88 (19 @ 07:41) (88 - 88)  RR: 17 (19 @ 04:30) (16 - 18)  SpO2: 97% (19 @ 04:30) (95% - 100%)  Wt(kg): --  Daily Height in cm: 162.56 (2019 12:45)    Daily Weight in k.9 (2019 07:41)  I&O's Summary    2019 07:01  -  2019 06:15  --------------------------------------------------------  IN: 1320 mL / OUT: 0 mL / NET: 1320 mL          Physical Exam:  Appearance: Normal  Procedural Access Site: Right radial artery access. No hematoma, Non-tender to palpation, 2+ pulse, No bruit, No Ecchymosis  Neurologic: Non-focal  Psychiatry: AAOx3, Mood & affect appropriate  Skin: No rashes, No ecchymoses, No cyanosis        136  |  94<L>  |  48<H>  ----------------------------<  90  4.6   |  24  |  7.08<H>    Ca    9.3      2019 05:11    TPro  7.9  /  Alb  4.5  /  TBili  0.4  /  DBili  x   /  AST  19  /  ALT  15  /  AlkPhos  82          Interpretation of Telemetry: SR 60-70bpm

## 2019-01-25 NOTE — PROGRESS NOTE ADULT - SUBJECTIVE AND OBJECTIVE BOX
[facility]   [phone]  DIALYSIS NOTE  Chief Complaint: ESRD/Ongoing hemodialysis requirement. seen on hd    24 hour events/subjective:    does not recall his TW.   called his hd unit in [place] been informed TW 65kg and they minimally remove fluid from him   he becomes hypotensive          ALLERGIES & MEDICATIONS  --------------------------------------------------------------------------------  Allergies    No Known Allergies    Intolerances      Standing Inpatient Medications  aspirin enteric coated 81 milliGRAM(s) Oral daily  atorvastatin 20 milliGRAM(s) Oral at bedtime  calcium acetate 1334 milliGRAM(s) Oral three times a day with meals  clopidogrel Tablet 75 milliGRAM(s) Oral daily  latanoprost 0.005% Ophthalmic Solution 1 Drop(s) Both EYES at bedtime  metoprolol tartrate 50 milliGRAM(s) Oral two times a day  multivitamin/minerals 1 Tablet(s) Oral daily  sodium chloride 0.9% lock flush 3 milliLiter(s) IV Push every 8 hours    PRN Inpatient Medications      REVIEW OF SYSTEMS  --------------------------------------------------------------------------------  no itching or rash  no fever or chill  no cp or palp   no sob or cough   no N/V/D/ no abd pain   ext no edema         VITALS/PHYSICAL EXAM  --------------------------------------------------------------------------------  T(C): 36.6 (01-25-19 @ 08:45), Max: 36.9 (01-24-19 @ 19:45)  HR: 72 (01-25-19 @ 08:45) (71 - 77)  BP: 143/72 (01-25-19 @ 08:45) (135/73 - 188/89)  RR: 17 (01-25-19 @ 08:45) (16 - 18)  SpO2: 98% (01-25-19 @ 08:45) (95% - 100%)  Wt(kg): --  Height (cm): 162.56 (01-24-19 @ 12:45)  Weight (kg): 64.9 (01-24-19 @ 12:45)  BMI (kg/m2): 24.6 (01-24-19 @ 12:45)  BSA (m2): 1.7 (01-24-19 @ 12:45)      01-24-19 @ 07:01  -  01-25-19 @ 07:00  --------------------------------------------------------  IN: 1320 mL / OUT: 0 mL / NET: 1320 mL    01-25-19 @ 07:01  -  01-25-19 @ 09:43  --------------------------------------------------------  IN: 240 mL / OUT: 0 mL / NET: 240 mL      Physical Exam:  		    	Gen: alert oriented place person and date   	Pulm: cta  no rales or ronchi  	CV: RRR, S1/S2  	Abd: +BS, soft, nontender/nondistended  	Extremity: No cyanosis, no edema no clubbing      LABS/STUDIES  --------------------------------------------------------------------------------              12.8   9.7   >-----------<  228      [01-25-19 @ 05:10]              36.1     136  |  94  |  48  ----------------------------<  90      [01-25-19 @ 05:11]  4.6   |  24  |  7.08        Ca     9.3     [01-25-19 @ 05:11]    TPro  7.9  /  Alb  4.5  /  TBili  0.4  /  DBili  x   /  AST  19  /  ALT  15  /  AlkPhos  82  [01-24-19 @ 08:10]

## 2019-01-25 NOTE — PROGRESS NOTE ADULT - ASSESSMENT
82 y/o [place] male pt PMH x of HTN, HLD, CAD-Recent Stent to RCA (6/8/18) and D1 (6/20/2018), ESRD on HD - had cp now admitted underwent coronary angio with stent placement.     1- esrd  2- shpt   3- hyperlipidemia  4- cad s/p coronary angio/stent       imp/suggest: ESRD      Hemodialysis Prescription:  	Access: avf  	Dialyzer: revaclear 300  	Blood Flow (mL/Min): 400  	Dialysate Flow (mL/Min): 600  	Target UF (Liters): 0.5 liter   	Treatment Time: 3 h  	Potassium: 2k  	Calcium: 2.5  	  CESAR    Vitamin D     continue with hd   see hd flow sheet

## 2019-01-28 PROBLEM — N32.0 BLADDER-NECK OBSTRUCTION: Chronic | Status: ACTIVE | Noted: 2019-01-24

## 2019-01-28 PROBLEM — Z95.5 PRESENCE OF CORONARY ANGIOPLASTY IMPLANT AND GRAFT: Chronic | Status: ACTIVE | Noted: 2019-01-24

## 2019-02-19 ENCOUNTER — APPOINTMENT (OUTPATIENT)
Dept: CARDIOLOGY | Facility: CLINIC | Age: 84
End: 2019-02-19

## 2019-04-24 ENCOUNTER — INBOUND DOCUMENT (OUTPATIENT)
Age: 84
End: 2019-04-24

## 2019-05-27 ENCOUNTER — INPATIENT (INPATIENT)
Facility: HOSPITAL | Age: 84
LOS: 2 days | Discharge: HOME | DRG: 246 | End: 2019-05-30
Attending: STUDENT IN AN ORGANIZED HEALTH CARE EDUCATION/TRAINING PROGRAM | Admitting: HOSPITALIST
Payer: MEDICARE

## 2019-05-27 VITALS
DIASTOLIC BLOOD PRESSURE: 68 MMHG | RESPIRATION RATE: 18 BRPM | HEART RATE: 65 BPM | HEIGHT: 64 IN | SYSTOLIC BLOOD PRESSURE: 133 MMHG | OXYGEN SATURATION: 96 % | TEMPERATURE: 98 F | WEIGHT: 145.06 LBS

## 2019-05-27 DIAGNOSIS — I77.0 ARTERIOVENOUS FISTULA, ACQUIRED: Chronic | ICD-10-CM

## 2019-05-27 DIAGNOSIS — R07.9 CHEST PAIN, UNSPECIFIED: ICD-10-CM

## 2019-05-27 DIAGNOSIS — I25.10 ATHEROSCLEROTIC HEART DISEASE OF NATIVE CORONARY ARTERY WITHOUT ANGINA PECTORIS: Chronic | ICD-10-CM

## 2019-05-27 DIAGNOSIS — C43.59 MALIGNANT MELANOMA OF OTHER PART OF TRUNK: Chronic | ICD-10-CM

## 2019-05-27 LAB
ALBUMIN SERPL ELPH-MCNC: 4.4 G/DL — SIGNIFICANT CHANGE UP (ref 3.3–5)
ALP SERPL-CCNC: 98 U/L — SIGNIFICANT CHANGE UP (ref 40–120)
ALT FLD-CCNC: 16 U/L — SIGNIFICANT CHANGE UP (ref 10–45)
ANION GAP SERPL CALC-SCNC: 13 MMOL/L — SIGNIFICANT CHANGE UP (ref 5–17)
APTT BLD: 27.1 SEC — LOW (ref 27.5–36.3)
AST SERPL-CCNC: 16 U/L — SIGNIFICANT CHANGE UP (ref 10–40)
BASE EXCESS BLDV CALC-SCNC: 10 MMOL/L — HIGH (ref -2–2)
BILIRUB SERPL-MCNC: 0.4 MG/DL — SIGNIFICANT CHANGE UP (ref 0.2–1.2)
BUN SERPL-MCNC: 31 MG/DL — HIGH (ref 7–23)
CA-I SERPL-SCNC: 1.16 MMOL/L — SIGNIFICANT CHANGE UP (ref 1.12–1.3)
CALCIUM SERPL-MCNC: 10 MG/DL — SIGNIFICANT CHANGE UP (ref 8.4–10.5)
CHLORIDE BLDV-SCNC: 97 MMOL/L — SIGNIFICANT CHANGE UP (ref 96–108)
CHLORIDE SERPL-SCNC: 97 MMOL/L — SIGNIFICANT CHANGE UP (ref 96–108)
CO2 BLDV-SCNC: 37 MMOL/L — HIGH (ref 22–30)
CO2 SERPL-SCNC: 31 MMOL/L — SIGNIFICANT CHANGE UP (ref 22–31)
CREAT SERPL-MCNC: 5.38 MG/DL — HIGH (ref 0.5–1.3)
GAS PNL BLDV: 138 MMOL/L — SIGNIFICANT CHANGE UP (ref 136–145)
GAS PNL BLDV: SIGNIFICANT CHANGE UP
GAS PNL BLDV: SIGNIFICANT CHANGE UP
GLUCOSE BLDV-MCNC: 92 MG/DL — SIGNIFICANT CHANGE UP (ref 70–99)
GLUCOSE SERPL-MCNC: 93 MG/DL — SIGNIFICANT CHANGE UP (ref 70–99)
HCO3 BLDV-SCNC: 35 MMOL/L — HIGH (ref 21–29)
HCT VFR BLD CALC: 35.8 % — LOW (ref 39–50)
HCT VFR BLDA CALC: 35 % — LOW (ref 39–50)
HGB BLD CALC-MCNC: 11.5 G/DL — LOW (ref 13–17)
HGB BLD-MCNC: 12.1 G/DL — LOW (ref 13–17)
INR BLD: 0.97 RATIO — SIGNIFICANT CHANGE UP (ref 0.88–1.16)
LACTATE BLDV-MCNC: 0.9 MMOL/L — SIGNIFICANT CHANGE UP (ref 0.7–2)
MCHC RBC-ENTMCNC: 33.9 GM/DL — SIGNIFICANT CHANGE UP (ref 32–36)
MCHC RBC-ENTMCNC: 34.8 PG — HIGH (ref 27–34)
MCV RBC AUTO: 103 FL — HIGH (ref 80–100)
NT-PROBNP SERPL-SCNC: 3173 PG/ML — HIGH (ref 0–300)
PCO2 BLDV: 52 MMHG — HIGH (ref 35–50)
PH BLDV: 7.44 — SIGNIFICANT CHANGE UP (ref 7.35–7.45)
PLATELET # BLD AUTO: 200 K/UL — SIGNIFICANT CHANGE UP (ref 150–400)
PO2 BLDV: 22 MMHG — LOW (ref 25–45)
POTASSIUM BLDV-SCNC: 4.1 MMOL/L — SIGNIFICANT CHANGE UP (ref 3.5–5.3)
POTASSIUM SERPL-MCNC: 3.5 MMOL/L — SIGNIFICANT CHANGE UP (ref 3.5–5.3)
POTASSIUM SERPL-SCNC: 3.5 MMOL/L — SIGNIFICANT CHANGE UP (ref 3.5–5.3)
PROT SERPL-MCNC: 7.3 G/DL — SIGNIFICANT CHANGE UP (ref 6–8.3)
PROTHROM AB SERPL-ACNC: 11 SEC — SIGNIFICANT CHANGE UP (ref 10–12.9)
RAPID RVP RESULT: SIGNIFICANT CHANGE UP
RBC # BLD: 3.48 M/UL — LOW (ref 4.2–5.8)
RBC # FLD: 13.3 % — SIGNIFICANT CHANGE UP (ref 10.3–14.5)
SAO2 % BLDV: 31 % — LOW (ref 67–88)
SODIUM SERPL-SCNC: 141 MMOL/L — SIGNIFICANT CHANGE UP (ref 135–145)
TROPONIN T, HIGH SENSITIVITY RESULT: 35 NG/L — SIGNIFICANT CHANGE UP (ref 0–51)
TROPONIN T, HIGH SENSITIVITY RESULT: 39 NG/L — SIGNIFICANT CHANGE UP (ref 0–51)
WBC # BLD: 6.9 K/UL — SIGNIFICANT CHANGE UP (ref 3.8–10.5)
WBC # FLD AUTO: 6.9 K/UL — SIGNIFICANT CHANGE UP (ref 3.8–10.5)

## 2019-05-27 PROCEDURE — 99285 EMERGENCY DEPT VISIT HI MDM: CPT | Mod: 25

## 2019-05-27 PROCEDURE — 93010 ELECTROCARDIOGRAM REPORT: CPT

## 2019-05-27 PROCEDURE — 71046 X-RAY EXAM CHEST 2 VIEWS: CPT | Mod: 26

## 2019-05-27 PROCEDURE — 99223 1ST HOSP IP/OBS HIGH 75: CPT

## 2019-05-27 RX ORDER — ASPIRIN/CALCIUM CARB/MAGNESIUM 324 MG
243 TABLET ORAL ONCE
Refills: 0 | Status: COMPLETED | OUTPATIENT
Start: 2019-05-27 | End: 2019-05-27

## 2019-05-27 RX ORDER — LATANOPROST 0.05 MG/ML
1 SOLUTION/ DROPS OPHTHALMIC; TOPICAL AT BEDTIME
Refills: 0 | Status: DISCONTINUED | OUTPATIENT
Start: 2019-05-27 | End: 2019-05-30

## 2019-05-27 RX ORDER — ASPIRIN/CALCIUM CARB/MAGNESIUM 324 MG
81 TABLET ORAL DAILY
Refills: 0 | Status: DISCONTINUED | OUTPATIENT
Start: 2019-05-27 | End: 2019-05-27

## 2019-05-27 RX ORDER — TICAGRELOR 90 MG/1
180 TABLET ORAL ONCE
Refills: 0 | Status: COMPLETED | OUTPATIENT
Start: 2019-05-27 | End: 2019-05-27

## 2019-05-27 RX ORDER — PANTOPRAZOLE SODIUM 20 MG/1
40 TABLET, DELAYED RELEASE ORAL
Refills: 0 | Status: DISCONTINUED | OUTPATIENT
Start: 2019-05-27 | End: 2019-05-30

## 2019-05-27 RX ORDER — CALCIUM ACETATE 667 MG
1334 TABLET ORAL
Refills: 0 | Status: DISCONTINUED | OUTPATIENT
Start: 2019-05-27 | End: 2019-05-30

## 2019-05-27 RX ORDER — METOPROLOL TARTRATE 50 MG
50 TABLET ORAL
Refills: 0 | Status: DISCONTINUED | OUTPATIENT
Start: 2019-05-27 | End: 2019-05-29

## 2019-05-27 RX ORDER — ATORVASTATIN CALCIUM 80 MG/1
80 TABLET, FILM COATED ORAL ONCE
Refills: 0 | Status: COMPLETED | OUTPATIENT
Start: 2019-05-27 | End: 2019-05-27

## 2019-05-27 RX ORDER — ASPIRIN/CALCIUM CARB/MAGNESIUM 324 MG
81 TABLET ORAL ONCE
Refills: 0 | Status: COMPLETED | OUTPATIENT
Start: 2019-05-27 | End: 2019-05-27

## 2019-05-27 RX ORDER — HEPARIN SODIUM 5000 [USP'U]/ML
INJECTION INTRAVENOUS; SUBCUTANEOUS
Qty: 25000 | Refills: 0 | Status: DISCONTINUED | OUTPATIENT
Start: 2019-05-27 | End: 2019-05-29

## 2019-05-27 RX ORDER — ATORVASTATIN CALCIUM 80 MG/1
80 TABLET, FILM COATED ORAL AT BEDTIME
Refills: 0 | Status: DISCONTINUED | OUTPATIENT
Start: 2019-05-28 | End: 2019-05-30

## 2019-05-27 RX ORDER — ASPIRIN/CALCIUM CARB/MAGNESIUM 324 MG
81 TABLET ORAL DAILY
Refills: 0 | Status: DISCONTINUED | OUTPATIENT
Start: 2019-05-27 | End: 2019-05-30

## 2019-05-27 RX ORDER — FERRIC CITRATE 210 MG/1
1 TABLET, COATED ORAL
Qty: 0 | Refills: 0 | DISCHARGE

## 2019-05-27 RX ORDER — HEPARIN SODIUM 5000 [USP'U]/ML
4100 INJECTION INTRAVENOUS; SUBCUTANEOUS ONCE
Refills: 0 | Status: COMPLETED | OUTPATIENT
Start: 2019-05-27 | End: 2019-05-27

## 2019-05-27 RX ORDER — TICAGRELOR 90 MG/1
90 TABLET ORAL
Refills: 0 | Status: DISCONTINUED | OUTPATIENT
Start: 2019-05-28 | End: 2019-05-30

## 2019-05-27 RX ORDER — HEPARIN SODIUM 5000 [USP'U]/ML
4100 INJECTION INTRAVENOUS; SUBCUTANEOUS EVERY 6 HOURS
Refills: 0 | Status: DISCONTINUED | OUTPATIENT
Start: 2019-05-27 | End: 2019-05-29

## 2019-05-27 RX ADMIN — Medication 243 MILLIGRAM(S): at 20:14

## 2019-05-27 RX ADMIN — HEPARIN SODIUM 800 UNIT(S)/HR: 5000 INJECTION INTRAVENOUS; SUBCUTANEOUS at 22:42

## 2019-05-27 RX ADMIN — Medication 81 MILLIGRAM(S): at 21:18

## 2019-05-27 RX ADMIN — TICAGRELOR 180 MILLIGRAM(S): 90 TABLET ORAL at 21:19

## 2019-05-27 RX ADMIN — HEPARIN SODIUM 4100 UNIT(S): 5000 INJECTION INTRAVENOUS; SUBCUTANEOUS at 22:41

## 2019-05-27 RX ADMIN — ATORVASTATIN CALCIUM 80 MILLIGRAM(S): 80 TABLET, FILM COATED ORAL at 21:18

## 2019-05-27 NOTE — H&P ADULT - NSICDXPASTSURGICALHX_GEN_ALL_CORE_FT
PAST SURGICAL HISTORY:  AV fistula 2016    CAD (coronary artery disease) x2 cardiac stent, 1st stent placed in 6/7/18 and 6/20/18 at [facility]    Malignant melanoma of back removal in 10/2018

## 2019-05-27 NOTE — ED PROVIDER NOTE - CLINICAL SUMMARY MEDICAL DECISION MAKING FREE TEXT BOX
Adult male ckd/hd,cad sp ptca w stents pw cp cw prior coronary artery disease. Check labs, ekg,trop, xray. probable admit  Presley Pepper MD, Facep

## 2019-05-27 NOTE — ED PROVIDER NOTE - PSH
AV fistula  2016  CAD (coronary artery disease)  x2 cardiac stent, 1st stent placed in 6/7/18 and 6/20/18 at [facility]  Malignant melanoma of back  removal in 10/2018

## 2019-05-27 NOTE — ED ADULT NURSE NOTE - OBJECTIVE STATEMENT
83 yrs old male with a cardiac history and CRF and is on dialysis M-W-F , present to the ER for chest pain radiating to both arms. As per pt he has being experiencing intermittent chest pain x1 week now getting progressively worse. Chest pain is associated with nausea and vomiting. Pt reports that the pain is worse at nighttime and then subsides in the day. As per pt he had similar symptoms the last time he had stents placement. Pt reported that the pain was excruciating this morning he called an ambulance , however had refused to go to the hospital. Reported that his pain level was 8.5/10 on pain scale. Denies fever/ chills.

## 2019-05-27 NOTE — CONSULT NOTE ADULT - SUBJECTIVE AND OBJECTIVE BOX
Patient seen and evaluated @   Reason for consult: CP    HPI: 83 year old M pt with PMH of HTN, HLD, CAD s/p multiple PCIs (known to Dr. Hodge), ESRD on HD, and BPH p/w 1 week of worsening chest pain. Pt endorsed non-exertional bilateral chest pain that lasts approx 1-2 hours each time with no clear alleviating/aggravating factors. Pt noted similar symptoms back in 1/2019 during which he was found to have significant CAD s/p multiple stents to D1, pRCA, and dRCA. Pt noted compliance with his medications and ROS was otherwise unremarkable.    Primary Service HPI:    PMH:   Bladder obstruction  Stented coronary artery  Hypertension  Malignant melanoma of right upper limb, including shoulder  Glaucoma  Cystitis  NSTEMI (non-ST elevated myocardial infarction)  CAD (coronary artery disease)  ESRD (end stage renal disease) on dialysis  BPH (benign prostatic hyperplasia)    PSH:   Malignant melanoma of back  CAD (coronary artery disease)  AV fistula    Medications:   heparin  Infusion.  Unit(s)/Hr IV Continuous <Continuous>  heparin  Injectable 4100 Unit(s) IV Push every 6 hours PRN    Allergies:  No Known Allergies    FAMILY HISTORY:  No pertinent family history in first degree relatives    Social History:  Smoking:  Alcohol:  Drugs:    Review of Systems:  Constitutional: [ ] Fever [ ] Chills [ ] Fatigue [ ] Weight change   HEENT: [ ] Blurred vision [ ] Eye Pain [ ] Headache [ ] Runny nose [ ] Sore Throat   Respiratory: [ ] Cough [ ] Wheezing [ ] Shortness of breath  Cardiovascular: [ ] Chest Pain [ ] Palpitations [ ] ABADLLA [ ] PND [ ] Orthopnea  Gastrointestinal: [ ] Abdominal Pain [ ] Diarrhea [ ] Constipation [ ] Hemorrhoids [ ] Nausea [ ] Vomiting  Genitourinary: [ ] Nocturia [ ] Dysuria [ ] Incontinence  Extremities: [ ] Swelling [ ] Joint Pain  Neurologic: [ ] Focal deficit [ ] Paresthesias [ ] Syncope  Lymphatic: [ ] Swelling [ ] Lymphadenopathy   Skin: [ ] Rash [ ] Ecchymoses [ ] Wounds [ ] Lesions  Psychiatry: [ ] Depression [ ] Suicidal/Homicidal Ideation [ ] Anxiety [ ] Sleep Disturbances  [ ] 10 point review of systems is otherwise negative except as mentioned above            [ ]Unable to obtain    Physical Exam:  T(C): 36.9 (05-27-19 @ 22:45), Max: 36.9 (05-27-19 @ 19:24)  HR: 78 (05-27-19 @ 22:45) (65 - 78)  BP: 129/65 (05-27-19 @ 22:45) (129/65 - 135/77)  RR: 22 (05-27-19 @ 22:45) (16 - 22)  SpO2: 95% (05-27-19 @ 22:45) (95% - 98%)  Wt(kg): --    Daily Height in cm: 162.56 (27 May 2019 18:04)    Daily     Appearance: NAD  Eyes: PERRL, EOMI  HENT: Normal oral mucosa, NC/AT  Cardiovascular: normal S1 and S2, RRR, no m/r/g, no edema, normal JVP  Procedural Access Site:  No hematoma, non-tender to palpation, 2+ pulses distally, no bruit, no ecchymosis  Respiratory: Clear to auscultation bilaterally  Gastrointestinal: Soft, non-tender, non-distended, BS+  Musculoskeletal: No clubbing, no joint deformity   Neurologic: Non-focal  Lymphatic: No lymphadenopathy  Psychiatry: AAOx3, mood & affect appropriate  Skin: No rashes, no ecchymoses, no cyanosis    Cardiovascular Diagnostic Testing:  ECG:    Echo:    Stress Testing:    Cath:    Interpretation of Telemetry:    Imaging:    Labs:                        12.1   6.9   )-----------( 200      ( 27 May 2019 18:47 )             35.8     05-27    141  |  97  |  31<H>  ----------------------------<  93  3.5   |  31  |  5.38<H>    Ca    10.0      27 May 2019 18:47    TPro  7.3  /  Alb  4.4  /  TBili  0.4  /  DBili  x   /  AST  16  /  ALT  16  /  AlkPhos  98  05-27    PT/INR - ( 27 May 2019 21:30 )   PT: 11.0 sec;   INR: 0.97 ratio         PTT - ( 27 May 2019 21:30 )  PTT:27.1 sec      Serum Pro-Brain Natriuretic Peptide: 3173 pg/mL (05-27 @ 18:47)

## 2019-05-27 NOTE — ED ADULT NURSE REASSESSMENT NOTE - NS ED NURSE REASSESS COMMENT FT1
Heparin administered to 18 G to R AC with 2 RN's at the bedside as per Heparin nomogram and MD order. Pt educated on S/S of bleeding and to call for assistance if needed, verbalized understanding. Call bell within reach.

## 2019-05-27 NOTE — CONSULT NOTE ADULT - ASSESSMENT
A/P: 83 year old M pt with PMH of HTN, HLD, CAD s/p multiple PCIs (known to Dr. Hodge), ESRD on HD, and BPH p/w 1 week of worsening chest pain.    - clinically stable with mild chest discomfort  - EKG with no significant change and trop of 30s, which is lower than previous study in 1/2019  - considering high risk and unstable angina, start ACS treatment with DAPT, heparin, and statin  - will discuss further with Dr. Hodge regarding possible LHC  - please call with further questions    Sotero Clement, #[phone]  Cardiology Fellow

## 2019-05-27 NOTE — H&P ADULT - HISTORY OF PRESENT ILLNESS
83M c hx CAD s/p multiple stents (Jun '18 & Jan '19), ESRD of unclear etiol on HD MWF (last HD earlier today), BPH s/p daily self-cath, HTN, R shoulder melanoma excision (2018), presents with chest pain.    Pt states he lives in the [place]. Pt reports intermittent nightly 30 minutes of diffuse chest pain with radiation to b/l arms. These episodes only seem to occur at night and self-resolve. Pt states he was awoken with an episode of chest pain this morning, 8 out of 10 level, associated with nausea, dry heaving, diaphoresis. Denies any palpitations, SOB during these times. Pt called EMS, but refused care. He then went to have full HD at his dialysis center, and afterwards, his son came to pick him to and drove him to this hospital. Pt also reports a dry persistent cough for the past 1 month.    VS: Tm 98.4, P 78, /65, R 22, 95% RA  In the ED, received , brilinta load, hep gtt, lipitor 80.

## 2019-05-27 NOTE — H&P ADULT - NSHPPHYSICALEXAM_GEN_ALL_CORE
PHYSICAL EXAM:   GENERAL: Alert. Not confused. No acute distress. Not thin. Not cachectic. Not obese.  HEAD:  Atraumatic. Normocephalic.  EYES: EOMI. PERRLA. Normal conjunctiva/sclera.  ENT: Neck supple. No JVD. Moist oral mucosa. Not edentulous. No thrush.  LYMPH: Normal supraclavicular/cervical lymph nodes.   CARDIAC: Not tachy, Not armando. Regular rhythm. Not irregularly irregular. S1. S2. No murmur. No rub. No distant heart sounds.  LUNG/CHEST: CTAB. BS equal bilaterally. No wheezes. No rales. No rhonchi.  ABDOMEN: Soft. No tenderness. No distension. No fluid wave. Normal bowel sounds.  BACK: No midline/vertebral tenderness. No flank tenderness.  VASCULAR: +2 b/l radial or ulnar pulses. Palpable DP pulses.  EXTREMITIES:  No clubbing. No cyanosis. No edema. Moving all 4.  NEUROLOGY: A&Ox3. Non-focal exam. Cranial nerves intact. Normal speech. Sensation intact.  PSYCH: Normal behavior. Normal affect.  SKIN: No jaundice. No erythema. No rash/lesion.  Vascular Access:     ICU Vital Signs Last 24 Hrs  T(C): 36.9 (27 May 2019 22:45), Max: 36.9 (27 May 2019 19:24)  T(F): 98.4 (27 May 2019 22:45), Max: 98.4 (27 May 2019 19:24)  HR: 78 (27 May 2019 22:45) (65 - 78)  BP: 129/65 (27 May 2019 22:45) (129/65 - 135/77)  BP(mean): --  ABP: --  ABP(mean): --  RR: 22 (27 May 2019 22:45) (16 - 22)  SpO2: 95% (27 May 2019 22:45) (95% - 98%)      I&O's Summary

## 2019-05-27 NOTE — H&P ADULT - PROBLEM SELECTOR PLAN 3
- will need to call Renal at some point. next HD will be Wednesday. Pt seen by Dr. Juan Aden last hospitalization.  - no need for urgent HD

## 2019-05-27 NOTE — H&P ADULT - ASSESSMENT
83M c hx CAD s/p multiple stents (Jun '18 & Jan '19), ESRD of unclear etiol on HD MWF (last HD earlier today), BPH s/p daily self-cath, HTN, R shoulder melanoma excision (2018), presents with chest pain.

## 2019-05-27 NOTE — ED PROVIDER NOTE - PMH
Bladder obstruction  with UTI in 6/2018  BPH (benign prostatic hyperplasia)    CAD (coronary artery disease)    Cystitis    ESRD (end stage renal disease) on dialysis    Glaucoma    Hypertension    Malignant melanoma of right upper limb, including shoulder    NSTEMI (non-ST elevated myocardial infarction)    Stented coronary artery

## 2019-05-27 NOTE — H&P ADULT - NSICDXPASTMEDICALHX_GEN_ALL_CORE_FT
PAST MEDICAL HISTORY:  Bladder obstruction with UTI in 6/2018    BPH (benign prostatic hyperplasia)     CAD (coronary artery disease)     Cystitis     ESRD (end stage renal disease) on dialysis     Glaucoma     Hypertension     Malignant melanoma of right upper limb, including shoulder     NSTEMI (non-ST elevated myocardial infarction)     Stented coronary artery

## 2019-05-27 NOTE — ED PROVIDER NOTE - OBJECTIVE STATEMENT
Private Physician Shauna Henry [place]/pcp  83y male pmh BPH, CAD, SP ptca w stent, ESRD on HD (M,W,F) completed today without incident, HTN, Melanoma. Pt comes to ed complains of chest pains for the past week. Awoke this am with pain and resolved, mod severe. Had called EMS. refused care. Pain 8/10. Seems worse at night. Rad to melva arms. associated with diaphoresis nausea/vomiting. Symptoms all cw prior coronary artery disease prior to most recent stent  1/2019. NO current pain. +cough.

## 2019-05-27 NOTE — H&P ADULT - NSHPSOCIALHISTORY_GEN_ALL_CORE
Lives alone, , former cigarette smoker 40 pk yrs, denies alcohol/illict drug use. Works at [employer] in [place]. Has 2 sons.

## 2019-05-27 NOTE — H&P ADULT - NSHPLABSRESULTS_GEN_ALL_CORE
Personally reviewed labs.   Personally reviewed imaging.   Personally reviewed EKG. NSR, rate 66, No ST or TW changes.                          12.1   6.9   )-----------( 200      ( 27 May 2019 18:47 )             35.8       05-27    141  |  97  |  31<H>  ----------------------------<  93  3.5   |  31  |  5.38<H>    Ca    10.0      27 May 2019 18:47    TPro  7.3  /  Alb  4.4  /  TBili  0.4  /  DBili  x   /  AST  16  /  ALT  16  /  AlkPhos  98  05-27            LIVER FUNCTIONS - ( 27 May 2019 18:47 )  Alb: 4.4 g/dL / Pro: 7.3 g/dL / ALK PHOS: 98 U/L / ALT: 16 U/L / AST: 16 U/L / GGT: x             PT/INR - ( 27 May 2019 21:30 )   PT: 11.0 sec;   INR: 0.97 ratio         PTT - ( 27 May 2019 21:30 )  PTT:27.1 sec

## 2019-05-27 NOTE — ED ADULT NURSE REASSESSMENT NOTE - NS ED NURSE REASSESS COMMENT FT1
Report received from PRASANNA Manrique. Pt is breathing unlabored on RA. VSS. Pt denies CP, SOB at this time. As per VICTORIANO Vazquez, pt can take at home medication, Auryxia 210 mg with food at this time. Educated pt on plan of care. Safety and comfort maintained. Awaiting Chest-X-ray results.

## 2019-05-27 NOTE — CONSULT NOTE ADULT - ATTENDING COMMENTS
Patient interviewed and examined. I was physically present for the essential portions of the E/M service provided.  Chart reviewed and note edited where appropriate.  Case discussed with fellow.  Agree w/ Assessment and Plan as outlined.  Sx-free at present w/ normal EXAM, ECG and flat, low level Troponin elevation.  Nevertheless reports that sxs are identical to prior ischemic events and agree w/ heparin repeat cath.     Escobar Hook MD [facility]  Spectra:  [phone]  Office: [phone]

## 2019-05-27 NOTE — H&P ADULT - NSHPREVIEWOFSYSTEMS_GEN_ALL_CORE
REVIEW OF SYSTEMS:  CONSTITUTIONAL: No weakness. No fevers. No chills. No rigors. No weight loss. No poor appetite.  EYES: No visual changes. No eye pain.  ENT: No hearing difficulty. No vertigo. No dysphagia. No Sinusitis/rhinorrhea.  NECK: No pain. No stiffness/rigidity.  CARDIAC: +chest pain. No palpitations. No lightheadedness.  RESPIRATORY: +cough. No SOB. No hemoptysis.  GASTROINTESTINAL: No abdominal pain. +nausea. +vomiting. No hematemesis. No diarrhea. No constipation. No melena. No hematochezia.  GENITOURINARY: No dysuria. No frequency. +hesitancy. No hematuria.  NEUROLOGICAL: No numbness/tingling. No focal weakness. No incontinence. No headache.  BACK: No back pain.  EXTREMITIES: No lower extremity edema. Full ROM.  SKIN: No rashes. No itching. No other lesions.  PSYCHIATRIC: No depression. No anxiety. No SI/HI.  ALLERGIC: No lip swelling. No hives.  All other review of systems is negative unless indicated above.  Unless indicated above, unable to assess ROS 2/2

## 2019-05-27 NOTE — ED PROVIDER NOTE - PROGRESS NOTE DETAILS
Received signout on pt from VICTORIANO Vazquez pending cards eval and recs. Cards fellow Dr. Sotero Clement evaluated pt at bedside, recommends admit to medicine,  load, Brilinta 180 mg load, heparin full anticoag, and atorvastatin 80 now and cards will follow as inpatient, will see pt tomorrow for possible cardiac catheterization. Attending made aware. Will put orders in and page hospitalist for admission. - Som Hall PA-C Received signout on pt from VICTORIANO Vazquez pending cards eval and recs. Cards fellow Dr. Sotero Clement evaluated pt at bedside, recommends admit to medicine,  load (already received 243, will give additional 81 mg), Brilinta 180 mg load, heparin for ACS anticoag, and atorvastatin 80 now and cards will follow as inpatient, will see pt tomorrow for possible cardiac catheterization. Attending made aware. Will put orders in and page hospitalist for admission. No coags ordered initially so will obtain prior to heparin. - Som Hall PA-C Pt endorsed to Dr. Danilo Platt to admit to tele. Advised him of convo w/ cards as above and that awaiting coag results prior to putting heparin order in. Attending aware. Pt stable for admission. - Som Hall PA-C Received signout on pt from VICTORIANO Vazquez pending cards eval and recs. Cards fellow Dr. Sotero Clement evaluated pt at bedside, recommends admit to medicine,  load (already received 243, will give additional 81 mg), Brilinta 180 mg load, heparin for ACS anticoag, and atorvastatin 80 now and cards will follow as inpatient, will see pt tomorrow for possible cardiac catheterization. Attending made aware. Will put orders in and page hospitalist for admission per attending and cards. No coags ordered initially so will obtain prior to heparin. - Som Hall PA-C

## 2019-05-27 NOTE — H&P ADULT - PROBLEM SELECTOR PLAN 1
- seen by cards  - SHELBY + poss [facility] by cards given that pt is a high risk patient  - cont protonix  - tele  - trend CE  - TTE  - grossly normal CT chest in May 2018 - concerning for unstable angina  - seen by cards  - SHELBY + poss LHC by cards given that pt is a high risk patient  - cont protonix  - tele  - trend CE  - TTE  - grossly normal CT chest in May 2018

## 2019-05-28 DIAGNOSIS — R07.9 CHEST PAIN, UNSPECIFIED: ICD-10-CM

## 2019-05-28 DIAGNOSIS — N18.6 END STAGE RENAL DISEASE: ICD-10-CM

## 2019-05-28 DIAGNOSIS — I10 ESSENTIAL (PRIMARY) HYPERTENSION: ICD-10-CM

## 2019-05-28 DIAGNOSIS — I25.110 ATHEROSCLEROTIC HEART DISEASE OF NATIVE CORONARY ARTERY WITH UNSTABLE ANGINA PECTORIS: ICD-10-CM

## 2019-05-28 LAB
ALBUMIN SERPL ELPH-MCNC: 4 G/DL — SIGNIFICANT CHANGE UP (ref 3.3–5)
ALP SERPL-CCNC: 90 U/L — SIGNIFICANT CHANGE UP (ref 40–120)
ALT FLD-CCNC: 15 U/L — SIGNIFICANT CHANGE UP (ref 10–45)
ANION GAP SERPL CALC-SCNC: 16 MMOL/L — SIGNIFICANT CHANGE UP (ref 5–17)
APTT BLD: 57.1 SEC — HIGH (ref 27.5–36.3)
APTT BLD: 59.7 SEC — HIGH (ref 27.5–36.3)
APTT BLD: 79 SEC — HIGH (ref 27.5–36.3)
APTT BLD: 86.7 SEC — HIGH (ref 27.5–36.3)
AST SERPL-CCNC: 14 U/L — SIGNIFICANT CHANGE UP (ref 10–40)
BILIRUB SERPL-MCNC: 0.5 MG/DL — SIGNIFICANT CHANGE UP (ref 0.2–1.2)
BUN SERPL-MCNC: 42 MG/DL — HIGH (ref 7–23)
CALCIUM SERPL-MCNC: 9.9 MG/DL — SIGNIFICANT CHANGE UP (ref 8.4–10.5)
CHLORIDE SERPL-SCNC: 98 MMOL/L — SIGNIFICANT CHANGE UP (ref 96–108)
CHOLEST SERPL-MCNC: 140 MG/DL — SIGNIFICANT CHANGE UP (ref 10–199)
CK MB CFR SERPL CALC: 1.8 NG/ML — SIGNIFICANT CHANGE UP (ref 0–6.7)
CK SERPL-CCNC: 50 U/L — SIGNIFICANT CHANGE UP (ref 30–200)
CO2 SERPL-SCNC: 28 MMOL/L — SIGNIFICANT CHANGE UP (ref 22–31)
CREAT SERPL-MCNC: 6.34 MG/DL — HIGH (ref 0.5–1.3)
GLUCOSE SERPL-MCNC: 95 MG/DL — SIGNIFICANT CHANGE UP (ref 70–99)
HCT VFR BLD CALC: 34.4 % — LOW (ref 39–50)
HDLC SERPL-MCNC: 38 MG/DL — LOW
HGB BLD-MCNC: 12 G/DL — LOW (ref 13–17)
LIPID PNL WITH DIRECT LDL SERPL: 85 MG/DL — SIGNIFICANT CHANGE UP
MAGNESIUM SERPL-MCNC: 2.3 MG/DL — SIGNIFICANT CHANGE UP (ref 1.6–2.6)
MCHC RBC-ENTMCNC: 34.8 GM/DL — SIGNIFICANT CHANGE UP (ref 32–36)
MCHC RBC-ENTMCNC: 35.5 PG — HIGH (ref 27–34)
MCV RBC AUTO: 102 FL — HIGH (ref 80–100)
PHOSPHATE SERPL-MCNC: 3.1 MG/DL — SIGNIFICANT CHANGE UP (ref 2.5–4.5)
PLATELET # BLD AUTO: 172 K/UL — SIGNIFICANT CHANGE UP (ref 150–400)
POTASSIUM SERPL-MCNC: 4 MMOL/L — SIGNIFICANT CHANGE UP (ref 3.5–5.3)
POTASSIUM SERPL-SCNC: 4 MMOL/L — SIGNIFICANT CHANGE UP (ref 3.5–5.3)
PROT SERPL-MCNC: 6.8 G/DL — SIGNIFICANT CHANGE UP (ref 6–8.3)
RBC # BLD: 3.37 M/UL — LOW (ref 4.2–5.8)
RBC # FLD: 13.4 % — SIGNIFICANT CHANGE UP (ref 10.3–14.5)
SODIUM SERPL-SCNC: 142 MMOL/L — SIGNIFICANT CHANGE UP (ref 135–145)
TOTAL CHOLESTEROL/HDL RATIO MEASUREMENT: 3.7 RATIO — SIGNIFICANT CHANGE UP (ref 3.4–9.6)
TRIGL SERPL-MCNC: 84 MG/DL — SIGNIFICANT CHANGE UP (ref 10–149)
TROPONIN T, HIGH SENSITIVITY RESULT: 37 NG/L — SIGNIFICANT CHANGE UP (ref 0–51)
WBC # BLD: 8 K/UL — SIGNIFICANT CHANGE UP (ref 3.8–10.5)
WBC # FLD AUTO: 8 K/UL — SIGNIFICANT CHANGE UP (ref 3.8–10.5)

## 2019-05-28 PROCEDURE — 99223 1ST HOSP IP/OBS HIGH 75: CPT | Mod: GC

## 2019-05-28 PROCEDURE — 93306 TTE W/DOPPLER COMPLETE: CPT | Mod: 26

## 2019-05-28 PROCEDURE — 99233 SBSQ HOSP IP/OBS HIGH 50: CPT

## 2019-05-28 PROCEDURE — 93010 ELECTROCARDIOGRAM REPORT: CPT

## 2019-05-28 RX ADMIN — TICAGRELOR 90 MILLIGRAM(S): 90 TABLET ORAL at 06:08

## 2019-05-28 RX ADMIN — PANTOPRAZOLE SODIUM 40 MILLIGRAM(S): 20 TABLET, DELAYED RELEASE ORAL at 06:07

## 2019-05-28 RX ADMIN — HEPARIN SODIUM 650 UNIT(S)/HR: 5000 INJECTION INTRAVENOUS; SUBCUTANEOUS at 14:05

## 2019-05-28 RX ADMIN — HEPARIN SODIUM 650 UNIT(S)/HR: 5000 INJECTION INTRAVENOUS; SUBCUTANEOUS at 20:28

## 2019-05-28 RX ADMIN — TICAGRELOR 90 MILLIGRAM(S): 90 TABLET ORAL at 17:59

## 2019-05-28 RX ADMIN — LATANOPROST 1 DROP(S): 0.05 SOLUTION/ DROPS OPHTHALMIC; TOPICAL at 01:11

## 2019-05-28 RX ADMIN — HEPARIN SODIUM 650 UNIT(S)/HR: 5000 INJECTION INTRAVENOUS; SUBCUTANEOUS at 06:49

## 2019-05-28 RX ADMIN — Medication 50 MILLIGRAM(S): at 06:07

## 2019-05-28 RX ADMIN — LATANOPROST 1 DROP(S): 0.05 SOLUTION/ DROPS OPHTHALMIC; TOPICAL at 21:42

## 2019-05-28 RX ADMIN — Medication 1334 MILLIGRAM(S): at 11:49

## 2019-05-28 RX ADMIN — Medication 50 MILLIGRAM(S): at 17:59

## 2019-05-28 RX ADMIN — HEPARIN SODIUM 0 UNIT(S)/HR: 5000 INJECTION INTRAVENOUS; SUBCUTANEOUS at 06:08

## 2019-05-28 RX ADMIN — Medication 81 MILLIGRAM(S): at 11:49

## 2019-05-28 RX ADMIN — Medication 1334 MILLIGRAM(S): at 18:31

## 2019-05-28 RX ADMIN — ATORVASTATIN CALCIUM 80 MILLIGRAM(S): 80 TABLET, FILM COATED ORAL at 21:42

## 2019-05-28 NOTE — CONSULT NOTE ADULT - SUBJECTIVE AND OBJECTIVE BOX
[facility]  [phone]  NEPHROLOGY      INITIAL CONSULT NOTE  --------------------------------------------------------------------------------  HPI:    well known to me from previous admissions    83M c hx CAD s/p multiple stents (Jun '18 & Jan '19), ESRD of unclear etiol on HD MWF (last HD earlier today), BPH/obstructive uropathy s/p daily self-cath, HTN, R shoulder melanoma excision (2018), presents with chest pain.  Pt states he lives in the [place], [place]. Pt reports intermittent nightly 30 minutes of diffuse chest pain with radiation to b/l arms. These episodes only seem to occur at night and self-resolve. Pt states he was awoken with an episode of chest pain this morning, 8 out of 10 level, associated with nausea, dry heaving, diaphoresis. Pt called EMS, but refused care. He then went to have full HD at his dialysis center, and afterwards, his son came to pick him to and drove him to [facility].  In the ED, received , brilinta load, hep gtt, lipitor 80.  is now scheduled for coronary angiogram. last hd yesterday. renal consult called.       PAST HISTORY  --------------------------------------------------------------------------------  PAST MEDICAL & SURGICAL HISTORY:  Bladder obstruction: with UTI in 6/2018  Stented coronary artery  Hypertension  Malignant melanoma of right upper limb, including shoulder  Glaucoma  Cystitis  NSTEMI (non-ST elevated myocardial infarction)  CAD (coronary artery disease)  ESRD (end stage renal disease) on dialysis  BPH (benign prostatic hyperplasia)  Malignant melanoma of back: removal in 10/2018  CAD (coronary artery disease): x2 cardiac stent, 1st stent placed in 6/7/18 and 6/20/18 at [facility]  AV fistula: 2016    FAMILY HISTORY:  No pertinent family history in first degree relatives    PAST SOCIAL HISTORY: no tobacco use     ALLERGIES & MEDICATIONS  --------------------------------------------------------------------------------  Allergies    No Known Allergies    Intolerances      Standing Inpatient Medications  aspirin enteric coated 81 milliGRAM(s) Oral daily  atorvastatin 80 milliGRAM(s) Oral at bedtime  calcium acetate 1334 milliGRAM(s) Oral three times a day with meals  heparin  Infusion.  Unit(s)/Hr IV Continuous <Continuous>  latanoprost 0.005% Ophthalmic Solution 1 Drop(s) Both EYES at bedtime  metoprolol tartrate 50 milliGRAM(s) Oral two times a day  pantoprazole    Tablet 40 milliGRAM(s) Oral before breakfast  ticagrelor 90 milliGRAM(s) Oral two times a day    PRN Inpatient Medications  heparin  Injectable 4100 Unit(s) IV Push every 6 hours PRN      REVIEW OF SYSTEMS  --------------------------------------------------------------------------------  Gen: No  fevers/chills   Skin: No rashes  Head/Eyes/Ears/Mouth: No headache; Normal hearing;  No sinus pain/discomfort, sore throat  Respiratory: No dyspnea, cough, wheezing, hemoptysis  CV: +  chest pain, orthopnea  GI: No abdominal pain, diarrhea,  had nausea, + vomiting, melena,  : No dysuria,   MSK: No joint pain/swelling; no back pain  Neuro: No dizziness/lightheadedness,   also with no edema     All other systems were reviewed and are negative, except as noted.    VITALS/PHYSICAL EXAM  --------------------------------------------------------------------------------  T(C): 36.9 (05-28-19 @ 19:31), Max: 36.9 (05-28-19 @ 19:31)  HR: 62 (05-28-19 @ 19:31) (62 - 76)  BP: 135/60 (05-28-19 @ 19:31) (135/60 - 175/75)  RR: 18 (05-28-19 @ 19:31) (16 - 18)  SpO2: 96% (05-28-19 @ 19:31) (96% - 99%)  Wt(kg): --  Height (cm): 162.56 (05-27-19 @ 18:04)  Weight (kg): 67.2 (05-27-19 @ 21:23)  BMI (kg/m2): 25.4 (05-27-19 @ 21:23)  BSA (m2): 1.72 (05-27-19 @ 21:23)      05-27-19 @ 07:01  -  05-28-19 @ 07:00  --------------------------------------------------------  IN: 190.5 mL / OUT: 0 mL / NET: 190.5 mL    05-28-19 @ 07:01  -  05-28-19 @ 23:14  --------------------------------------------------------  IN: 240 mL / OUT: 0 mL / NET: 240 mL      Physical Exam:  	Gen: Non toxic comfortable appearing   	no jvd ,  	Pulm: decrease bs  no rales or ronchi or wheezing  	CV: RRR, S1S2; no rub  	Back: No CVA tenderness; no sacral edema  	Abd: +BS, soft, nontender/nondistended  	: No suprapubic tenderness  	UE: Warm, no cyanosis  no clubbing,  no edema  	LE: Warm, no cyanosis  no clubbing, no edema  	Neuro: alert and oriented. speech coherent   	Psych: Normal affect and mood  	Skin: Warm, no decrease skin turgor   	Vascular access: LUE AVF + bruit and thrill     LABS/STUDIES  --------------------------------------------------------------------------------              12.0   8.0   >-----------<  172      [05-28-19 @ 05:37]              34.4     142  |  98  |  42  ----------------------------<  95      [05-28-19 @ 05:36]  4.0   |  28  |  6.34        Ca     9.9     [05-28-19 @ 05:36]      Mg     2.3     [05-28-19 @ 05:36]      Phos  3.1     [05-28-19 @ 05:36]    TPro  6.8  /  Alb  4.0  /  TBili  0.5  /  DBili  x   /  AST  14  /  ALT  15  /  AlkPhos  90  [05-28-19 @ 05:36]    PT/INR: PT 11.0 , INR 0.97       [05-27-19 @ 21:30]  PTT: 57.1       [05-28-19 @ 20:03]    CK 50      [05-28-19 @ 05:36]    Creatinine Trend:  SCr 6.34 [05-28 @ 05:36]  SCr 5.38 [05-27 @ 18:47]    Urinalysis - [06-07-18 @ 07:23]      Color Yellow / Appearance Clear / SG 1.012 / pH >9.0      Gluc 100 / Ketone Negative  / Bili Negative / Urobili Negative       Blood Small / Protein 300 / Leuk Est Moderate / Nitrite Negative      RBC 5-10 / WBC >50 / Hyaline  / Gran  / Sq Epi  / Non Sq Epi Few / Bacteria       HbA1c 5.0      [06-07-18 @ 11:06]  TSH 2.15      [06-07-18 @ 08:03]  Lipid: chol 140, TG 84, HDL 38, LDL 85      [05-28-19 @ 08:44]    HBsAb <3.0      [01-25-19 @ 10:31]  HBsAg Nonreact      [01-25-19 @ 10:31]  HBcAb Nonreact      [01-25-19 @ 10:31]  HCV 0.13, Nonreact      [01-25-19 @ 10:31]    < from: Xray Chest 2 Views PA/Lat (05.27.19 @ 19:04) >    EXAM:  XR CHEST PA LAT 2V                            PROCEDURE DATE:  05/27/2019            INTERPRETATION:    CLINICAL INDICATION: Chest pain.    TECHNIQUE: PA and lateral views of the chest.    COMPARISON: 6/7/2018.    FINDINGS:     Cardiac size is within normal limits.   The lungs are clear.   There are no pleural effusions. No pneumothorax.  There is no acute osseous abnormality.     IMPRESSION:   Clear lungs.       < end of copied text >

## 2019-05-28 NOTE — CONSULT NOTE ADULT - ASSESSMENT
83M c hx CAD s/p multiple stents (Jun '18 & Jan '19), ESRD of unclear etiology on HD MWF (last HD earlier today), BPH/obstructive uropathy s/p daily self-cath, HTN, R shoulder melanoma excision (2018), presents with chest pain.    1- esrd  2- cp  3- shpt    lytes in range  hd am after hd unless situation changes   consent obtained for hd witnessed and placed in chart  cont phoslo 2 tab with meals  heparin drip as well as metoprolol 50 mg bid

## 2019-05-28 NOTE — PROGRESS NOTE ADULT - SUBJECTIVE AND OBJECTIVE BOX
Patient is a 83y old  Male who presents with a chief complaint of chest pain (27 May 2019 23:28)      SUBJECTIVE / OVERNIGHT EVENTS: Patient seen and examined at bedside. He denies any CP, SOB, abd pain and n/v. He and family only want Dr. Hodge to perform LHC.     ROS:  All other review of systems negative    Allergies    No Known Allergies    Intolerances        MEDICATIONS  (STANDING):  aspirin enteric coated 81 milliGRAM(s) Oral daily  atorvastatin 80 milliGRAM(s) Oral at bedtime  calcium acetate 1334 milliGRAM(s) Oral three times a day with meals  heparin  Infusion.  Unit(s)/Hr (8 mL/Hr) IV Continuous <Continuous>  latanoprost 0.005% Ophthalmic Solution 1 Drop(s) Both EYES at bedtime  metoprolol tartrate 50 milliGRAM(s) Oral two times a day  pantoprazole    Tablet 40 milliGRAM(s) Oral before breakfast  ticagrelor 90 milliGRAM(s) Oral two times a day    MEDICATIONS  (PRN):  heparin  Injectable 4100 Unit(s) IV Push every 6 hours PRN For aPTT less than 40      Vital Signs Last 24 Hrs  T(C): 36.7 (28 May 2019 06:06), Max: 36.9 (27 May 2019 19:24)  T(F): 98.1 (28 May 2019 06:06), Max: 98.4 (27 May 2019 19:24)  HR: 69 (28 May 2019 06:06) (65 - 78)  BP: 150/67 (28 May 2019 06:06) (129/65 - 150/67)  BP(mean): --  RR: 16 (28 May 2019 06:06) (16 - 22)  SpO2: 97% (28 May 2019 06:06) (95% - 98%)  CAPILLARY BLOOD GLUCOSE        I&O's Summary    27 May 2019 07:01  -  28 May 2019 07:00  --------------------------------------------------------  IN: 190.5 mL / OUT: 0 mL / NET: 190.5 mL        PHYSICAL EXAM:  GENERAL: NAD, well-developed  HEAD:  Atraumatic, Normocephalic  EYES: EOMI, PERRLA, conjunctiva and sclera clear  CHEST/LUNG: Clear to auscultation bilaterally; No wheeze  HEART: Regular rate and rhythm; No murmurs, rubs, or gallops  ABDOMEN: Soft, Nontender, Nondistended; Bowel sounds present  EXTREMITIES:  2+ Peripheral Pulses, No clubbing, cyanosis, or edema  NEUROLOGY: AAOx3, non-focal  PSYCH: calm  SKIN: No rashes or lesions    LABS:                        12.0   8.0   )-----------( 172      ( 28 May 2019 05:37 )             34.4     05-28    142  |  98  |  42<H>  ----------------------------<  95  4.0   |  28  |  6.34<H>    Ca    9.9      28 May 2019 05:36  Phos  3.1     05-28  Mg     2.3     05-28    TPro  6.8  /  Alb  4.0  /  TBili  0.5  /  DBili  x   /  AST  14  /  ALT  15  /  AlkPhos  90  05-28    PT/INR - ( 27 May 2019 21:30 )   PT: 11.0 sec;   INR: 0.97 ratio         PTT - ( 28 May 2019 08:33 )  PTT:79.0 sec  CARDIAC MARKERS ( 28 May 2019 05:36 )  x     / x     / 50 U/L / x     / 1.8 ng/mL          RADIOLOGY & ADDITIONAL TESTS:    Consultant(s) Notes Reviewed:  cardiology rec noted     Care Discussed with Consultants/Other Providers: medicine NP     Case Discussed with dtr-in-law at bedside.

## 2019-05-29 ENCOUNTER — TRANSCRIPTION ENCOUNTER (OUTPATIENT)
Age: 84
End: 2019-05-29

## 2019-05-29 LAB
ANION GAP SERPL CALC-SCNC: 19 MMOL/L — HIGH (ref 5–17)
APTT BLD: 55.6 SEC — HIGH (ref 27.5–36.3)
BUN SERPL-MCNC: 51 MG/DL — HIGH (ref 7–23)
CALCIUM SERPL-MCNC: 10.1 MG/DL — SIGNIFICANT CHANGE UP (ref 8.4–10.5)
CHLORIDE SERPL-SCNC: 98 MMOL/L — SIGNIFICANT CHANGE UP (ref 96–108)
CO2 SERPL-SCNC: 24 MMOL/L — SIGNIFICANT CHANGE UP (ref 22–31)
CREAT SERPL-MCNC: 7.92 MG/DL — HIGH (ref 0.5–1.3)
GLUCOSE SERPL-MCNC: 88 MG/DL — SIGNIFICANT CHANGE UP (ref 70–99)
HCT VFR BLD CALC: 36.8 % — LOW (ref 39–50)
HGB BLD-MCNC: 13.1 G/DL — SIGNIFICANT CHANGE UP (ref 13–17)
MCHC RBC-ENTMCNC: 35.6 GM/DL — SIGNIFICANT CHANGE UP (ref 32–36)
MCHC RBC-ENTMCNC: 36.2 PG — HIGH (ref 27–34)
MCV RBC AUTO: 102 FL — HIGH (ref 80–100)
PLATELET # BLD AUTO: 176 K/UL — SIGNIFICANT CHANGE UP (ref 150–400)
POTASSIUM SERPL-MCNC: 4.4 MMOL/L — SIGNIFICANT CHANGE UP (ref 3.5–5.3)
POTASSIUM SERPL-SCNC: 4.4 MMOL/L — SIGNIFICANT CHANGE UP (ref 3.5–5.3)
RBC # BLD: 3.61 M/UL — LOW (ref 4.2–5.8)
RBC # FLD: 13.4 % — SIGNIFICANT CHANGE UP (ref 10.3–14.5)
SODIUM SERPL-SCNC: 141 MMOL/L — SIGNIFICANT CHANGE UP (ref 135–145)
WBC # BLD: 6.9 K/UL — SIGNIFICANT CHANGE UP (ref 3.8–10.5)
WBC # FLD AUTO: 6.9 K/UL — SIGNIFICANT CHANGE UP (ref 3.8–10.5)

## 2019-05-29 PROCEDURE — 99232 SBSQ HOSP IP/OBS MODERATE 35: CPT | Mod: GC

## 2019-05-29 PROCEDURE — 93010 ELECTROCARDIOGRAM REPORT: CPT

## 2019-05-29 PROCEDURE — 99152 MOD SED SAME PHYS/QHP 5/>YRS: CPT | Mod: GC

## 2019-05-29 PROCEDURE — 92920 PRQ TRLUML C ANGIOP 1ART&/BR: CPT | Mod: RC,GC

## 2019-05-29 PROCEDURE — 99233 SBSQ HOSP IP/OBS HIGH 50: CPT

## 2019-05-29 PROCEDURE — 93458 L HRT ARTERY/VENTRICLE ANGIO: CPT | Mod: 26,59,GC

## 2019-05-29 RX ORDER — METOPROLOL TARTRATE 50 MG
100 TABLET ORAL ONCE
Refills: 0 | Status: COMPLETED | OUTPATIENT
Start: 2019-05-29 | End: 2019-05-29

## 2019-05-29 RX ORDER — METOPROLOL TARTRATE 50 MG
100 TABLET ORAL DAILY
Refills: 0 | Status: DISCONTINUED | OUTPATIENT
Start: 2019-05-29 | End: 2019-05-30

## 2019-05-29 RX ORDER — ZALEPLON 10 MG
5 CAPSULE ORAL AT BEDTIME
Refills: 0 | Status: DISCONTINUED | OUTPATIENT
Start: 2019-05-29 | End: 2019-05-30

## 2019-05-29 RX ADMIN — Medication 1334 MILLIGRAM(S): at 12:14

## 2019-05-29 RX ADMIN — Medication 81 MILLIGRAM(S): at 05:04

## 2019-05-29 RX ADMIN — HEPARIN SODIUM 650 UNIT(S)/HR: 5000 INJECTION INTRAVENOUS; SUBCUTANEOUS at 05:40

## 2019-05-29 RX ADMIN — Medication 100 MILLIGRAM(S): at 10:37

## 2019-05-29 RX ADMIN — Medication 5 MILLIGRAM(S): at 00:07

## 2019-05-29 RX ADMIN — PANTOPRAZOLE SODIUM 40 MILLIGRAM(S): 20 TABLET, DELAYED RELEASE ORAL at 05:05

## 2019-05-29 RX ADMIN — TICAGRELOR 90 MILLIGRAM(S): 90 TABLET ORAL at 05:04

## 2019-05-29 RX ADMIN — Medication 1334 MILLIGRAM(S): at 08:28

## 2019-05-29 RX ADMIN — ATORVASTATIN CALCIUM 80 MILLIGRAM(S): 80 TABLET, FILM COATED ORAL at 21:37

## 2019-05-29 RX ADMIN — TICAGRELOR 90 MILLIGRAM(S): 90 TABLET ORAL at 18:50

## 2019-05-29 RX ADMIN — LATANOPROST 1 DROP(S): 0.05 SOLUTION/ DROPS OPHTHALMIC; TOPICAL at 21:38

## 2019-05-29 NOTE — CHART NOTE - NSCHARTNOTEFT_GEN_A_CORE
Upon removal of radial band by JACK Maldonado RN, she was unable to achieve hemostasis at right radial catheter insertion iste. D-Stat band put in place, right radial pulse palpable, no swelling, bleeding or hematoma at site, positive capillary refill in right hand. Will attempt to remove D-Stat band at 22:00, will continue to monitor.        Castro Borges, [facility]    [phone]

## 2019-05-29 NOTE — CHART NOTE - NSCHARTNOTEFT_GEN_A_CORE
Spoke via telephone with Nephrologist Dr. SACHIN Watson, patient will go for hemodialysis on Thursday 5/30/19, will continue to monitor.        Castro Borges, [facility]    [phone]

## 2019-05-29 NOTE — PROGRESS NOTE ADULT - SUBJECTIVE AND OBJECTIVE BOX
CARDIOLOGY PROGRESS NOTE    Interval Events:   No overnight events. Denies chest pain, palpitations, SOB, lightheadedness, dizziness.    Telemetry: SR, 60s    Review Of Systems:   CONSTITUTIONAL: No fevers, chills, weakness  EYES/ENT: No visual changes;  No dysphagia  NECK: No pain or stiffness  RESPIRATORY: No cough, wheezing, hemoptysis; No shortness of breath  CARDIOVASCULAR: No chest pain or palpitations; No lower extremity edema  GASTROINTESTINAL: No abdominal or epigastric pain. No nausea, vomiting, or hematemesis; No diarrhea or constipation. No melena or hematochezia.  BACK: No back pain  GENITOURINARY: No dysuria, frequency or hematuria  NEUROLOGICAL: No numbness or weakness  SKIN: No itching, burning, rashes, or lesions   All other review of systems is negative unless indicated above.    Medications:  aspirin enteric coated 81 milliGRAM(s) Oral daily  atorvastatin 80 milliGRAM(s) Oral at bedtime  calcium acetate 1334 milliGRAM(s) Oral three times a day with meals  heparin  Infusion.  Unit(s)/Hr IV Continuous <Continuous>  heparin  Injectable 4100 Unit(s) IV Push every 6 hours PRN  latanoprost 0.005% Ophthalmic Solution 1 Drop(s) Both EYES at bedtime  metoprolol tartrate 50 milliGRAM(s) Oral two times a day  pantoprazole    Tablet 40 milliGRAM(s) Oral before breakfast  ticagrelor 90 milliGRAM(s) Oral two times a day  zaleplon 5 milliGRAM(s) Oral at bedtime PRN      PAST MEDICAL & SURGICAL HISTORY:  Bladder obstruction: with UTI in 2018  Stented coronary artery  Hypertension  Malignant melanoma of right upper limb, including shoulder  Glaucoma  Cystitis  NSTEMI (non-ST elevated myocardial infarction)  CAD (coronary artery disease)  ESRD (end stage renal disease) on dialysis  BPH (benign prostatic hyperplasia)  Malignant melanoma of back: removal in 10/2018  CAD (coronary artery disease): x2 cardiac stent, 1st stent placed in 18 and 18 at [facility]  AV fistula: 2016      Vitals:  T(F): 97.9 (05-29), Max: 98.4 (05-28)  HR: 66 (-29) (62 - 76)  BP: 156/63 (05-29) (135/60 - 175/75)  RR: 18 (05-29)  SpO2: 97% ()  I&O's Summary    27 May 2019 07:  -  28 May 2019 07:00  --------------------------------------------------------  IN: 190.5 mL / OUT: 0 mL / NET: 190.5 mL    28 May 2019 07:  -  29 May 2019 06:44  --------------------------------------------------------  IN: 438 mL / OUT: 0 mL / NET: 438 mL    Physical Exam:  Appearance: no acute distress  HEENT: PERRL, EOMI  Cardiovascular: no JVD, RRR, normal S1 S2, no murmurs, rubs, or gallops, no edema  Respiratory: normal effort, clear to auscultation bilaterally  Gastrointestinal: soft, non-tender, non-distended  Neurologic: AAOx3, non-focal  Psychiatry: mood & affect appropriate  Extremities: warm to touch  Vascular: peripheral pulses palpable 2+ bilaterally  Skin: no rashes, ecchymoses, or cyanosis    Labs:                        13.1   6.9   )-----------( 176      ( 29 May 2019 05:07 )             36.8         141  |  98  |  51<H>  ----------------------------<  88  4.4   |  24  |  7.92<H>    Ca    10.1      29 May 2019 05:07  Phos  3.1       Mg     2.3         TPro  6.8  /  Alb  4.0  /  TBili  0.5  /  DBili  x   /  AST  14  /  ALT  15  /  AlkPhos  90      PT/INR - ( 27 May 2019 21:30 )   PT: 11.0 sec;   INR: 0.97 ratio         PTT - ( 29 May 2019 05:07 )  PTT:55.6 sec  CARDIAC MARKERS ( 28 May 2019 05:36 )  x     / x     / 50 U/L / x     / 1.8 ng/mL      Serum Pro-Brain Natriuretic Peptide: 3173 pg/mL ( @ 18:47)    Total Cholesterol: 140  LDL: 85  HDL: 38  T        CARDIOVASCULAR DIAGNOSTIC TESTING:  ECG: Personally Reviewed    [] Echocardiogram:  < from: Transthoracic Echocardiogram (19 @ 15:25) >  ------------------------------------------------------------------------  Conclusions:  1. Mitral annular calcification, otherwise normal mitral  valve.  2. Calcified trileaflet aortic valve with normal opening.  3. Normal left ventricular internal dimensions and wall  thicknesses.  4. Normal left ventricular systolic function. No segmental  wall motion abnormalities (WMA).The endocardium is not well  visualized but overall appears normal. If clinically  indicated, can consider imaging with ultrasound enhancing  agent to definitively evalaute for WMA.  5. Normal diastolic function  6. Normal right ventricular size and function.  *** Compared with echocardiogram of 2018, no  significant changes noted. No significant or noticable wall  motionabnormalities appreciated.  ------------------------------------------------------------------------  Confirmed on  2019 - 03:26:38 by Nic Lucero M.D.      [] Stress Test:  [] Catheterization:    RADIOLOGY:

## 2019-05-29 NOTE — PROGRESS NOTE ADULT - SUBJECTIVE AND OBJECTIVE BOX
[facility]   [phone]  DIALYSIS NOTE  Chief Complaint: ESRD/Ongoing hemodialysis requirement.     24 hour events/subjective:    seen earlier         ALLERGIES & MEDICATIONS  --------------------------------------------------------------------------------  Allergies    No Known Allergies    Intolerances      Standing Inpatient Medications  aspirin enteric coated 81 milliGRAM(s) Oral daily  atorvastatin 80 milliGRAM(s) Oral at bedtime  calcium acetate 1334 milliGRAM(s) Oral three times a day with meals  latanoprost 0.005% Ophthalmic Solution 1 Drop(s) Both EYES at bedtime  metoprolol succinate  milliGRAM(s) Oral daily  pantoprazole    Tablet 40 milliGRAM(s) Oral before breakfast  ticagrelor 90 milliGRAM(s) Oral two times a day    PRN Inpatient Medications  zaleplon 5 milliGRAM(s) Oral at bedtime PRN      REVIEW OF SYSTEMS  --------------------------------------------------------------------------------  no itching or rash  no fever or chill  no cp or palp   no sob or cough   no N/V/D/ no abd pain   ext no edema no pain         VITALS/PHYSICAL EXAM  --------------------------------------------------------------------------------  T(C): 36.7 (05-29-19 @ 21:00), Max: 36.7 (05-29-19 @ 13:52)  HR: 69 (05-29-19 @ 13:52) (66 - 69)  BP: 144/70 (05-29-19 @ 13:52) (144/70 - 156/63)  RR: 18 (05-29-19 @ 13:52) (18 - 18)  SpO2: 96% (05-29-19 @ 13:52) (96% - 97%)  Wt(kg): --        05-28-19 @ 07:01  -  05-29-19 @ 07:00  --------------------------------------------------------  IN: 438 mL / OUT: 0 mL / NET: 438 mL      Physical Exam:  		    	Gen: alert oriented place person and date   	Pulm: Decreased breath sounds b/l bases no rales or ronchi  	CV: RRR, S1/S2  	Abd: +BS, soft, nontender/nondistended  	Extremity: No cyanosis, no edema no clubbing  	Neuro: No focal deficits  	    LABS/STUDIES  --------------------------------------------------------------------------------              13.1   6.9   >-----------<  176      [05-29-19 @ 05:07]              36.8     141  |  98  |  51  ----------------------------<  88      [05-29-19 @ 05:07]  4.4   |  24  |  7.92        Ca     10.1     [05-29-19 @ 05:07]      Mg     2.3     [05-28-19 @ 05:36]      Phos  3.1     [05-28-19 @ 05:36]    TPro  6.8  /  Alb  4.0  /  TBili  0.5  /  DBili  x   /  AST  14  /  ALT  15  /  AlkPhos  90  [05-28-19 @ 05:36]      PTT: 55.6       [05-29-19 @ 05:07]    CK 50      [05-28-19 @ 05:36]            imp/suggest: ESRD      Hemodialysis Prescription:  	Access:  	Dialyzer: revaclear   	Blood Flow (mL/Min): 400  	Dialysate Flow (mL/Min): 600  	Target UF (Liters):  	Treatment Time:  	Potassium:   	Calcium: 2.5  	  CESAR    Vitamin D     continue with hd   see hd flow sheet

## 2019-05-30 ENCOUNTER — TRANSCRIPTION ENCOUNTER (OUTPATIENT)
Age: 84
End: 2019-05-30

## 2019-05-30 VITALS
DIASTOLIC BLOOD PRESSURE: 70 MMHG | TEMPERATURE: 98 F | OXYGEN SATURATION: 100 % | HEART RATE: 68 BPM | SYSTOLIC BLOOD PRESSURE: 130 MMHG | RESPIRATION RATE: 18 BRPM

## 2019-05-30 LAB
ANION GAP SERPL CALC-SCNC: 18 MMOL/L — HIGH (ref 5–17)
APTT BLD: 26.8 SEC — LOW (ref 27.5–36.3)
BUN SERPL-MCNC: 62 MG/DL — HIGH (ref 7–23)
CALCIUM SERPL-MCNC: 9.7 MG/DL — SIGNIFICANT CHANGE UP (ref 8.4–10.5)
CHLORIDE SERPL-SCNC: 97 MMOL/L — SIGNIFICANT CHANGE UP (ref 96–108)
CO2 SERPL-SCNC: 22 MMOL/L — SIGNIFICANT CHANGE UP (ref 22–31)
CREAT SERPL-MCNC: 9.44 MG/DL — HIGH (ref 0.5–1.3)
GLUCOSE SERPL-MCNC: 93 MG/DL — SIGNIFICANT CHANGE UP (ref 70–99)
HCT VFR BLD CALC: 34.5 % — LOW (ref 39–50)
HGB BLD-MCNC: 12.2 G/DL — LOW (ref 13–17)
MAGNESIUM SERPL-MCNC: 2.5 MG/DL — SIGNIFICANT CHANGE UP (ref 1.6–2.6)
MCHC RBC-ENTMCNC: 35.4 GM/DL — SIGNIFICANT CHANGE UP (ref 32–36)
MCHC RBC-ENTMCNC: 36.3 PG — HIGH (ref 27–34)
MCV RBC AUTO: 102 FL — HIGH (ref 80–100)
PLATELET # BLD AUTO: 171 K/UL — SIGNIFICANT CHANGE UP (ref 150–400)
POTASSIUM SERPL-MCNC: 4.2 MMOL/L — SIGNIFICANT CHANGE UP (ref 3.5–5.3)
POTASSIUM SERPL-SCNC: 4.2 MMOL/L — SIGNIFICANT CHANGE UP (ref 3.5–5.3)
RBC # BLD: 3.37 M/UL — LOW (ref 4.2–5.8)
RBC # FLD: 13.3 % — SIGNIFICANT CHANGE UP (ref 10.3–14.5)
SODIUM SERPL-SCNC: 137 MMOL/L — SIGNIFICANT CHANGE UP (ref 135–145)
WBC # BLD: 7.4 K/UL — SIGNIFICANT CHANGE UP (ref 3.8–10.5)
WBC # FLD AUTO: 7.4 K/UL — SIGNIFICANT CHANGE UP (ref 3.8–10.5)

## 2019-05-30 PROCEDURE — 82330 ASSAY OF CALCIUM: CPT

## 2019-05-30 PROCEDURE — 83605 ASSAY OF LACTIC ACID: CPT

## 2019-05-30 PROCEDURE — 71046 X-RAY EXAM CHEST 2 VIEWS: CPT

## 2019-05-30 PROCEDURE — 87486 CHLMYD PNEUM DNA AMP PROBE: CPT

## 2019-05-30 PROCEDURE — 99285 EMERGENCY DEPT VISIT HI MDM: CPT

## 2019-05-30 PROCEDURE — 85027 COMPLETE CBC AUTOMATED: CPT

## 2019-05-30 PROCEDURE — 85610 PROTHROMBIN TIME: CPT

## 2019-05-30 PROCEDURE — 83880 ASSAY OF NATRIURETIC PEPTIDE: CPT

## 2019-05-30 PROCEDURE — 92920 PRQ TRLUML C ANGIOP 1ART&/BR: CPT | Mod: RC

## 2019-05-30 PROCEDURE — 82947 ASSAY GLUCOSE BLOOD QUANT: CPT

## 2019-05-30 PROCEDURE — C1887: CPT

## 2019-05-30 PROCEDURE — 80048 BASIC METABOLIC PNL TOTAL CA: CPT

## 2019-05-30 PROCEDURE — 82435 ASSAY OF BLOOD CHLORIDE: CPT

## 2019-05-30 PROCEDURE — 80053 COMPREHEN METABOLIC PANEL: CPT

## 2019-05-30 PROCEDURE — 84295 ASSAY OF SERUM SODIUM: CPT

## 2019-05-30 PROCEDURE — 93306 TTE W/DOPPLER COMPLETE: CPT

## 2019-05-30 PROCEDURE — C1894: CPT

## 2019-05-30 PROCEDURE — 87581 M.PNEUMON DNA AMP PROBE: CPT

## 2019-05-30 PROCEDURE — 84132 ASSAY OF SERUM POTASSIUM: CPT

## 2019-05-30 PROCEDURE — 99261: CPT

## 2019-05-30 PROCEDURE — 82803 BLOOD GASES ANY COMBINATION: CPT

## 2019-05-30 PROCEDURE — 99152 MOD SED SAME PHYS/QHP 5/>YRS: CPT

## 2019-05-30 PROCEDURE — 85730 THROMBOPLASTIN TIME PARTIAL: CPT

## 2019-05-30 PROCEDURE — 80061 LIPID PANEL: CPT

## 2019-05-30 PROCEDURE — 82553 CREATINE MB FRACTION: CPT

## 2019-05-30 PROCEDURE — 84100 ASSAY OF PHOSPHORUS: CPT

## 2019-05-30 PROCEDURE — C1769: CPT

## 2019-05-30 PROCEDURE — 99153 MOD SED SAME PHYS/QHP EA: CPT

## 2019-05-30 PROCEDURE — 99239 HOSP IP/OBS DSCHRG MGMT >30: CPT

## 2019-05-30 PROCEDURE — 84484 ASSAY OF TROPONIN QUANT: CPT

## 2019-05-30 PROCEDURE — 93005 ELECTROCARDIOGRAM TRACING: CPT

## 2019-05-30 PROCEDURE — 87633 RESP VIRUS 12-25 TARGETS: CPT

## 2019-05-30 PROCEDURE — 85014 HEMATOCRIT: CPT

## 2019-05-30 PROCEDURE — 87798 DETECT AGENT NOS DNA AMP: CPT

## 2019-05-30 PROCEDURE — C1725: CPT

## 2019-05-30 PROCEDURE — 93458 L HRT ARTERY/VENTRICLE ANGIO: CPT | Mod: 59

## 2019-05-30 PROCEDURE — 82550 ASSAY OF CK (CPK): CPT

## 2019-05-30 PROCEDURE — 83735 ASSAY OF MAGNESIUM: CPT

## 2019-05-30 RX ORDER — TICAGRELOR 90 MG/1
1 TABLET ORAL
Qty: 60 | Refills: 0
Start: 2019-05-30 | End: 2019-06-28

## 2019-05-30 RX ADMIN — TICAGRELOR 90 MILLIGRAM(S): 90 TABLET ORAL at 05:27

## 2019-05-30 RX ADMIN — PANTOPRAZOLE SODIUM 40 MILLIGRAM(S): 20 TABLET, DELAYED RELEASE ORAL at 05:26

## 2019-05-30 RX ADMIN — Medication 81 MILLIGRAM(S): at 05:26

## 2019-05-30 RX ADMIN — Medication 100 MILLIGRAM(S): at 05:26

## 2019-05-30 RX ADMIN — Medication 1334 MILLIGRAM(S): at 08:51

## 2019-05-30 NOTE — DISCHARGE NOTE PROVIDER - CARE PROVIDERS DIRECT ADDRESSES
[email or web address]

## 2019-05-30 NOTE — DISCHARGE NOTE PROVIDER - NSDCPNSUBOBJ_GEN_ALL_CORE
Patient is a 83y old  Male who presents with a chief complaint of chest pain (30 May 2019 01:57)                    Allergies        No Known Allergies        Intolerances                Medications:    aspirin enteric coated 81 milliGRAM(s) Oral daily    atorvastatin 80 milliGRAM(s) Oral at bedtime    calcium acetate 1334 milliGRAM(s) Oral three times a day with meals    latanoprost 0.005% Ophthalmic Solution 1 Drop(s) Both EYES at bedtime    metoprolol succinate  milliGRAM(s) Oral daily    pantoprazole    Tablet 40 milliGRAM(s) Oral before breakfast    ticagrelor 90 milliGRAM(s) Oral two times a day    zaleplon 5 milliGRAM(s) Oral at bedtime PRN            Vitals:    T(C): 36.7 (05-29-19 @ 21:00), Max: 36.7 (05-29-19 @ 13:52)    HR: 63 (05-30-19 @ 01:30) (54 - 69)    BP: 159/74 (05-30-19 @ 01:30) (144/70 - 188/68)    BP(mean): --    RR: 18 (05-30-19 @ 01:30) (18 - 18)    SpO2: 98% (05-30-19 @ 01:30) (96% - 100%)    Wt(kg): --    Daily       Daily     I&O's Summary        28 May 2019 07:01  -  29 May 2019 07:00    --------------------------------------------------------    IN: 438 mL / OUT: 0 mL / NET: 438 mL        29 May 2019 07:01  -  30 May 2019 02:45    --------------------------------------------------------    IN: 480 mL / OUT: 0 mL / NET: 480 mL                    Physical Exam:    Appearance: Normal    Eyes: PERRL, EOMI    HENT: Normal oral [place], NC/AT    Cardiovascular: S1S2, RRR, No M/R/G, no JVD, No Lower extremity edema    Procedural Access Site: No hematoma, Non-tender to palpation, 2+ pulse, No bruit, No Ecchymosis    Respiratory: Clear to auscultation bilaterally    Gastrointestinal: Soft, Non tender, Normal Bowel Sounds    Musculoskeletal: No clubbing, No joint deformity     Neurologic: Non-focal    Lymphatic: No lymphadenopathy    Psychiatry: AAOx3, Mood & affect appropriate    Skin: No rashes, No ecchymoses, No cyanosis        05-29        141  |  98  |  51<H>    ----------------------------<  88    4.4   |  24  |  7.92<H>        Ca    10.1      29 May 2019 05:07    Phos  3.1     05-28    Mg     2.3     05-28        TPro  6.8  /  Alb  4.0  /  TBili  0.5  /  DBili  x   /  AST  14  /  ALT  15  /  AlkPhos  90  05-28        PTT - ( 29 May 2019 05:07 )  PTT:55.6 sec    CARDIAC MARKERS ( 28 May 2019 05:36 )    x     / x     / 50 U/L / x     / 1.8 ng/mL            Serum Pro-Brain Natriuretic Peptide: 3173 pg/mL (05-27 @ 18:47)        Lipid panel     Hgb A1c                             13.1     6.9   )-----------( 176      ( 29 May 2019 05:07 )               36.8                 ECG: SR 63 bpm        Cath: POBA to [facility]        nephrology: HD on Thursday 5/30        Interpretation of Telemetry:

## 2019-05-30 NOTE — PROGRESS NOTE ADULT - SUBJECTIVE AND OBJECTIVE BOX
Removal of Right Radial D-Stat Band    Pulses in the right upper extremity are palpable. The patient was placed in the supine position. The insertion site was identified and the D-Stat band loosened per protocol. The D-Stat band was removed slowly. Direct pressure was applied for 15 minutes.      Monitoring of the right wrist and both upper extremities including neuro-vascular checks and vital signs every 15 minutes x 4.    Complications: None    Comments:

## 2019-05-30 NOTE — PROGRESS NOTE ADULT - SUBJECTIVE AND OBJECTIVE BOX
1- Hour Post Removal of Right Radial Band Assessment of Access Site    Vital signs are stable, neuro-vascular status of the upper extremities is intact, stable and there is no evidence of hematoma on the right upper extremity.     Complications: NONE    Comments:

## 2019-05-30 NOTE — DISCHARGE NOTE PROVIDER - PROVIDER TOKENS
PROVIDER:[TOKEN:[[id number]]],FREE:[LAST:[Cardiologist],PHONE:[(   )    -],FAX:[(   )    -],ADDRESS:[Follow up with your cardiologist at home]]

## 2019-05-30 NOTE — DISCHARGE NOTE NURSING/CASE MANAGEMENT/SOCIAL WORK - NSDCDPATPORTLINK_GEN_ALL_CORE
You can access the [facility] Portal, offered by [facility], by registering with the following website: [email or web address]

## 2019-05-30 NOTE — PROGRESS NOTE ADULT - PROBLEM SELECTOR PLAN 2
- cont asa, brilinta, lipitor  - no hx of DM2

## 2019-05-30 NOTE — PROGRESS NOTE ADULT - ATTENDING COMMENTS
Castro Borges, [facility]    [phone]
Castro Borges, [facility]    [phone]
Patient interviewed and examined. I was physically present for the essential portions of the E/M service provided.  Chart reviewed and note edited where appropriate.  Case discussed with fellow.  Agree w/ Assessment and Plan as outlined.    Escobar Hook MD [facility]  Spectra:  [phone]  Office: [phone]
Pt is medically stable for d/c home, with outpt cardiology follow up. Time spent 35 min

## 2019-05-30 NOTE — PROGRESS NOTE ADULT - PROVIDER SPECIALTY LIST ADULT
Cardiology
Hospitalist
Hospitalist
Nephrology
Cardiology
Nephrology
Hospitalist

## 2019-05-30 NOTE — PROGRESS NOTE ADULT - SUBJECTIVE AND OBJECTIVE BOX
Patient is a 83y old  Male who presents with a chief complaint of chest pain (30 May 2019 08:33)      SUBJECTIVE / OVERNIGHT EVENTS: Patient seen and examined at bedside. He denies any CP, SOB, abd pain and n/v. no acute events overnight, tolerated LHC well yesterday.      ROS:  All other review of systems negative    Allergies    No Known Allergies    Intolerances        MEDICATIONS  (STANDING):  aspirin enteric coated 81 milliGRAM(s) Oral daily  atorvastatin 80 milliGRAM(s) Oral at bedtime  calcium acetate 1334 milliGRAM(s) Oral three times a day with meals  latanoprost 0.005% Ophthalmic Solution 1 Drop(s) Both EYES at bedtime  metoprolol succinate  milliGRAM(s) Oral daily  pantoprazole    Tablet 40 milliGRAM(s) Oral before breakfast  ticagrelor 90 milliGRAM(s) Oral two times a day    MEDICATIONS  (PRN):  zaleplon 5 milliGRAM(s) Oral at bedtime PRN Insomnia      Vital Signs Last 24 Hrs  T(C): 36.8 (30 May 2019 09:30), Max: 36.9 (30 May 2019 04:23)  T(F): 98.2 (30 May 2019 09:30), Max: 98.4 (30 May 2019 04:23)  HR: 64 (30 May 2019 09:30) (54 - 69)  BP: 178/72 (30 May 2019 09:30) (144/70 - 188/68)  BP(mean): --  RR: 18 (30 May 2019 09:30) (18 - 18)  SpO2: 98% (30 May 2019 09:30) (96% - 100%)  CAPILLARY BLOOD GLUCOSE        I&O's Summary    29 May 2019 07:01  -  30 May 2019 07:00  --------------------------------------------------------  IN: 600 mL / OUT: 0 mL / NET: 600 mL    30 May 2019 07:01  -  30 May 2019 13:06  --------------------------------------------------------  IN: 50 mL / OUT: 0 mL / NET: 50 mL        PHYSICAL EXAM:  GENERAL: NAD, well-developed  HEAD:  Atraumatic, Normocephalic  EYES: EOMI, PERRLA, conjunctiva and sclera clear  NECK: Supple, No JVD  CHEST/LUNG: Clear to auscultation bilaterally; No wheeze  HEART: Regular rate and rhythm; No murmurs, rubs, or gallops  ABDOMEN: Soft, Nontender, Nondistended; Bowel sounds present  EXTREMITIES:  2+ Peripheral Pulses, No clubbing, cyanosis, or edema  NEUROLOGY: AAOx3, non-focal  PSYCH: calm  SKIN: No rashes or lesions    LABS:                        12.2   7.4   )-----------( 171      ( 30 May 2019 05:03 )             34.5     05-30    137  |  97  |  62<H>  ----------------------------<  93  4.2   |  22  |  9.44<H>    Ca    9.7      30 May 2019 05:03  Mg     2.5     05-30      PTT - ( 30 May 2019 05:03 )  PTT:26.8 sec          RADIOLOGY & ADDITIONAL TESTS:    Imaging Personally Reviewed:    Consultant(s) Notes Reviewed:      Care Discussed with Consultants/Other Providers:    Case Discussed with Family:    Goals of Care: Patient is a 83y old  Male who presents with a chief complaint of chest pain (30 May 2019 08:33)      SUBJECTIVE / OVERNIGHT EVENTS: Patient seen and examined at bedside. He denies any CP, SOB, abd pain and n/v. no acute events overnight, tolerated LHC well yesterday.      ROS:  All other review of systems negative    Allergies    No Known Allergies    Intolerances        MEDICATIONS  (STANDING):  aspirin enteric coated 81 milliGRAM(s) Oral daily  atorvastatin 80 milliGRAM(s) Oral at bedtime  calcium acetate 1334 milliGRAM(s) Oral three times a day with meals  latanoprost 0.005% Ophthalmic Solution 1 Drop(s) Both EYES at bedtime  metoprolol succinate  milliGRAM(s) Oral daily  pantoprazole    Tablet 40 milliGRAM(s) Oral before breakfast  ticagrelor 90 milliGRAM(s) Oral two times a day    MEDICATIONS  (PRN):  zaleplon 5 milliGRAM(s) Oral at bedtime PRN Insomnia      Vital Signs Last 24 Hrs  T(C): 36.8 (30 May 2019 09:30), Max: 36.9 (30 May 2019 04:23)  T(F): 98.2 (30 May 2019 09:30), Max: 98.4 (30 May 2019 04:23)  HR: 64 (30 May 2019 09:30) (54 - 69)  BP: 178/72 (30 May 2019 09:30) (144/70 - 188/68)  BP(mean): --  RR: 18 (30 May 2019 09:30) (18 - 18)  SpO2: 98% (30 May 2019 09:30) (96% - 100%)  CAPILLARY BLOOD GLUCOSE        I&O's Summary    29 May 2019 07:01  -  30 May 2019 07:00  --------------------------------------------------------  IN: 600 mL / OUT: 0 mL / NET: 600 mL    30 May 2019 07:01  -  30 May 2019 13:06  --------------------------------------------------------  IN: 50 mL / OUT: 0 mL / NET: 50 mL        PHYSICAL EXAM:  GENERAL: NAD, well-developed  HEAD:  Atraumatic, Normocephalic  EYES: EOMI, PERRLA, conjunctiva and sclera clear  NECK: Supple, No JVD  CHEST/LUNG: Clear to auscultation bilaterally; No wheeze  HEART: Regular rate and rhythm; No murmurs, rubs, or gallops  ABDOMEN: Soft, Nontender, Nondistended; Bowel sounds present  EXTREMITIES:  2+ Peripheral Pulses, No clubbing, cyanosis, or edema  NEUROLOGY: AAOx3, non-focal  PSYCH: calm  SKIN: No rashes or lesions    LABS:                        12.2   7.4   )-----------( 171      ( 30 May 2019 05:03 )             34.5     05-30    137  |  97  |  62<H>  ----------------------------<  93  4.2   |  22  |  9.44<H>    Ca    9.7      30 May 2019 05:03  Mg     2.5     05-30      PTT - ( 30 May 2019 05:03 )  PTT:26.8 sec          RADIOLOGY & ADDITIONAL TESTS:    Consultant(s) Notes Reviewed:  Cardiology rec noted     Care Discussed with Consultants/Other Providers: medicine NP

## 2019-05-30 NOTE — DISCHARGE NOTE PROVIDER - NSDCCPCAREPLAN_GEN_ALL_CORE_FT
PRINCIPAL DISCHARGE DIAGNOSIS  Diagnosis: CAD (coronary artery disease), native coronary artery  Assessment and Plan of Treatment: Do not stop you Aspirin or Brilinta unless instructed to do so by your cardiologist, they help keep your stented arteries open.   No heavy lifting or pushing/pulling with procedure arm for 2 weeks. No driving for 2 days. You may shower 24 hours following the procedure but avoid baths/swimming for 1 week. Check your wrist site for bleeding and/or swelling daily following procedure and call your doctor immediately if it occurs or if you experience increased pain at the site. Follow up with your cardiologist in 1-2 weeks. You may call [facility] if you have any questions/concerns regarding your procedure [phone].      SECONDARY DISCHARGE DIAGNOSES  Diagnosis: HTN (hypertension)  Assessment and Plan of Treatment: Continue with your blood pressure medications; eat a heart healthy diet with low salt diet; exercise regularly (consult with your physician or cardiologist first); maintain a heart healthy weight; if you smoke - quit (A resource to help you stop smoking is the [facility] [employer] – phone number [phone].); include healthy ways to manage stress. Continue to follow with your primary care physician or cardiologist.    Diagnosis: HLD (hyperlipidemia)  Assessment and Plan of Treatment: Continue with your cholesterol medications. Eat a heart healthy diet that is low in saturated fats and salt, and includes whole grains, fruits, vegetables and lean protein; exercise regularly (consult with your physician or cardiologist first); maintain a heart healthy weight; if you smoke - quit (A resource to help you stop smoking is the [facility] [employer] – phone number [phone].). Continue to follow with your primary physician or cardiologist.

## 2019-05-30 NOTE — PROGRESS NOTE ADULT - PROBLEM SELECTOR PLAN 1
- concerning for unstable angina  - seen by cards poss [facility], tomorrow by cards given that pt is a high risk patient  - c/w  DAPT, heparin, and statin  - cont protonix  - tele  - TTE  - grossly normal CT chest in May 2018
- concerning for unstable angina  - cardiology consult appreciated: [facility] for [facility] today  - c/w  DAPT, heparin, and statin  - started on Toprol- mg   - tele  - TTE: EF 55-60%
- concerning for unstable angina  - cardiology consult appreciated: s/p [facility] 5/29 s/p POBA to RCA  - c/w  DAPT, and statin  - c/w Toprol- mg   - TTE: EF 55-60%  - pt stable for d/c after HD, f/u w/ cardiology outpt.

## 2019-05-30 NOTE — PROGRESS NOTE ADULT - SUBJECTIVE AND OBJECTIVE BOX
CARDIOLOGY PROGRESS NOTE    Interval Events:   s/p POBA to RCA. No chest pain or SOB.    Review Of Systems:   CONSTITUTIONAL: No fevers, chills, weakness  EYES/ENT: No visual changes;  No dysphagia  NECK: No pain or stiffness  RESPIRATORY: No cough, wheezing, hemoptysis; No shortness of breath  CARDIOVASCULAR: No chest pain or palpitations; No lower extremity edema  GASTROINTESTINAL: No abdominal or epigastric pain. No nausea, vomiting, or hematemesis; No diarrhea or constipation. No melena or hematochezia.  BACK: No back pain  GENITOURINARY: No dysuria, frequency or hematuria  NEUROLOGICAL: No numbness or weakness  SKIN: No itching, burning, rashes, or lesions   All other review of systems is negative unless indicated above.    Medications:  aspirin enteric coated 81 milliGRAM(s) Oral daily  atorvastatin 80 milliGRAM(s) Oral at bedtime  calcium acetate 1334 milliGRAM(s) Oral three times a day with meals  latanoprost 0.005% Ophthalmic Solution 1 Drop(s) Both EYES at bedtime  metoprolol succinate  milliGRAM(s) Oral daily  pantoprazole    Tablet 40 milliGRAM(s) Oral before breakfast  ticagrelor 90 milliGRAM(s) Oral two times a day  zaleplon 5 milliGRAM(s) Oral at bedtime PRN      PAST MEDICAL & SURGICAL HISTORY:  Bladder obstruction: with UTI in 6/2018  Stented coronary artery  Hypertension  Malignant melanoma of right upper limb, including shoulder  Glaucoma  Cystitis  NSTEMI (non-ST elevated myocardial infarction)  CAD (coronary artery disease)  ESRD (end stage renal disease) on dialysis  BPH (benign prostatic hyperplasia)  Malignant melanoma of back: removal in 10/2018  CAD (coronary artery disease): x2 cardiac stent, 1st stent placed in 6/7/18 and 6/20/18 at [facility]  AV fistula: 2016      Vitals:  T(F): 98.4 (05-30), Max: 98.4 (05-30)  HR: 65 (05-30) (54 - 69)  BP: 171/61 (05-30) (144/70 - 188/68)  RR: 18 (05-30)  SpO2: 97% (05-30)  I&O's Summary    29 May 2019 07:01  -  30 May 2019 07:00  --------------------------------------------------------  IN: 600 mL / OUT: 0 mL / NET: 600 mL        Physical Exam:  Appearance: no acute distress  HEENT: PERRL, EOMI  Cardiovascular: no JVD, RRR, normal S1 S2, no murmurs, rubs, or gallops, no edema  Respiratory: normal effort, clear to auscultation bilaterally  Gastrointestinal: soft, non-tender, non-distended  Neurologic: AAOx3, non-focal  Psychiatry: mood & affect appropriate  Extremities: warm to touch  Vascular: peripheral pulses palpable 2+ bilaterally  Skin: no rashes, ecchymoses, or cyanosis    Labs:                        12.2   7.4   )-----------( 171      ( 30 May 2019 05:03 )             34.5     05-30    137  |  97  |  62<H>  ----------------------------<  93  4.2   |  22  |  9.44<H>    Ca    9.7      30 May 2019 05:03  Mg     2.5     05-30    PTT - ( 30 May 2019 05:03 )  PTT:26.8 sec    Serum Pro-Brain Natriuretic Peptide: 3173 pg/mL (05-27 @ 18:47)      CARDIOVASCULAR DIAGNOSTIC TESTING:  ECG: Personally Reviewed    [] Echocardiogram:  [] Stress Test:  [] Catheterization:    RADIOLOGY:

## 2019-05-30 NOTE — PROGRESS NOTE ADULT - PROBLEM SELECTOR PROBLEM 2
Coronary artery disease involving native coronary artery of native heart with unstable angina pectoris

## 2019-05-30 NOTE — PROGRESS NOTE ADULT - SUBJECTIVE AND OBJECTIVE BOX
[facility]   [phone]  DIALYSIS NOTE  Chief Complaint: ESRD/Ongoing hemodialysis requirement. seen on hd    24 hour events/subjective:      seen on hd earlier.   states only does hd for 2 hr 15 min ???      ALLERGIES & MEDICATIONS  --------------------------------------------------------------------------------  Allergies    No Known Allergies    Intolerances      Standing Inpatient Medications  aspirin enteric coated 81 milliGRAM(s) Oral daily  atorvastatin 80 milliGRAM(s) Oral at bedtime  calcium acetate 1334 milliGRAM(s) Oral three times a day with meals  latanoprost 0.005% Ophthalmic Solution 1 Drop(s) Both EYES at bedtime  metoprolol succinate  milliGRAM(s) Oral daily  pantoprazole    Tablet 40 milliGRAM(s) Oral before breakfast  ticagrelor 90 milliGRAM(s) Oral two times a day    PRN Inpatient Medications  zaleplon 5 milliGRAM(s) Oral at bedtime PRN      REVIEW OF SYSTEMS  --------------------------------------------------------------------------------  no itching or rash  no fever or chill  no cp or palp   no sob or cough   no N/V/D/ no abd pain   ext no edema no pain         VITALS/PHYSICAL EXAM  --------------------------------------------------------------------------------  T(C): 36.4 (05-30-19 @ 17:15), Max: 36.9 (05-30-19 @ 04:23)  HR: 68 (05-30-19 @ 17:15) (56 - 68)  BP: 130/70 (05-30-19 @ 17:15) (113/54 - 185/75)  RR: 18 (05-30-19 @ 17:15) (18 - 18)  SpO2: 100% (05-30-19 @ 17:15) (97% - 100%)  Wt(kg): --        05-29-19 @ 07:01  -  05-30-19 @ 07:00  --------------------------------------------------------  IN: 600 mL / OUT: 0 mL / NET: 600 mL    05-30-19 @ 07:01  -  05-30-19 @ 21:17  --------------------------------------------------------  IN: 50 mL / OUT: 1400 mL / NET: -1350 mL      Physical Exam:  		    	Gen: alert oriented place person and date   	Pulm: Decreased breath sounds b/l bases no rales or ronchi  	CV: RRR, S1/S2  	Abd: +BS, soft, nontender/nondistended  	Extremity: No cyanosis, no edema no clubbing  	Neuro: No focal deficits  	    LABS/STUDIES  --------------------------------------------------------------------------------              12.2   7.4   >-----------<  171      [05-30-19 @ 05:03]              34.5     137  |  97  |  62  ----------------------------<  93      [05-30-19 @ 05:03]  4.2   |  22  |  9.44        Ca     9.7     [05-30-19 @ 05:03]      Mg     2.5     [05-30-19 @ 05:03]        PTT: 26.8       [05-30-19 @ 05:03]

## 2019-05-30 NOTE — PROGRESS NOTE ADULT - ASSESSMENT
83M c hx CAD s/p multiple stents (Jun '18 & Jan '19), ESRD of unclear etiology on HD MWF (last HD earlier today), BPH/obstructive uropathy s/p daily self-cath, HTN, R shoulder melanoma excision (2018), presents with chest pain.    1- esrd  2- cp  3- shpt  post cath hd revaclear 300 3.5 hr 1.5-2 liter 2 k bath
83M with CAD s/p multiple PCI, HTN, HLD, ESRD on HD p/w UA. Echo with preserved EF, unchanged from 6/2018.    Recommendations  -continue ASA, Brillanta, Atorvastatin, Heparin gtt  -change Metoprolol to Toprol 100mg XL  -Heparin gtt can be d/c after C  -[facility] today 5/28 (Dr. Hodge)    Blair Vickers M.D.  Cardiology Fellow  [phone]  For all Cardiology service contact information, go to [email or web address] and use "[email or web address]" to login.
83M c hx CAD s/p multiple stents (Jun '18 & Jan '19), ESRD of unclear etiol on HD MWF (last HD earlier today), BPH s/p daily self-cath, HTN, R shoulder melanoma excision (2018), presents with chest pain.
83M c hx CAD s/p multiple stents (Jun '18 & Jan '19), ESRD of unclear etiology on HD MWF (last HD earlier today), BPH/obstructive uropathy s/p daily self-cath, HTN, R shoulder melanoma excision (2018), presents with chest pain.    1- esrd  2- cp  3- shpt    hd revaclear 300 3.0 hr 1.5-2 liter 2 k bath  see hd flow sheet.   next hd am outpt scheduled by pt
83M w/ CAD s/p multiple PCI, HTN, HLD, ESRD on HD presents with UA.  Now s/p POBA RCA.   Echo with preserved EF, unchanged from 6/2018.    Recommendations  -continue ASA, Brillanta, Atorvastatin  -continue Toprol 100mg XL  -stable for discharge with outpatient cardiology follow-up    Blair Vickers M.D.  Cardiology Fellow  [phone]  For all Cardiology service contact information, go to [email or web address] and use "[email or web address]" to login.
83M c hx CAD s/p multiple stents (Jun '18 & Jan '19), ESRD of unclear etiol on HD MWF (last HD earlier today), BPH s/p daily self-cath, HTN, R shoulder melanoma excision (2018), presents with chest pain.
83M c hx CAD s/p multiple stents (Jun '18 & Jan '19), ESRD of unclear etiol on HD MWF (last HD earlier today), BPH s/p daily self-cath, HTN, R shoulder melanoma excision (2018), presents with chest pain.

## 2019-05-30 NOTE — DISCHARGE NOTE PROVIDER - HOSPITAL COURSE
HPI:    83M c hx CAD s/p multiple stents (Jun '18 & Jan '19), ESRD of unclear etiol on HD MWF (last HD earlier today), BPH s/p daily self-cath, HTN, R shoulder melanoma excision (2018), presents with chest pain.        Pt states he lives in the [place], [place]. Pt reports intermittent nightly 30 minutes of diffuse chest pain with radiation to b/l arms. These episodes only seem to occur at night and self-resolve. Pt states he was awoken with an episode of chest pain this morning, 8 out of 10 level, associated with nausea, dry heaving, diaphoresis. Denies any palpitations, SOB during these times. Pt called EMS, but refused care. He then went to have full HD at his dialysis center, and afterwards, his son came to pick him to and drove him to this hospital. Pt also reports a dry persistent cough for the past 1 month.        VS: Tm 98.4, P 78, /65, R 22, 95% RA    In the ED, received , Brilinta load, Heparin gtt, Lipitor 80. (27 May 2019 23:28)        5/29 cardiac cath with POBA to the RCA. Right radial site without swelling, bleeding. HPI:    83M c hx CAD s/p multiple stents (Jun '18 & Jan '19), ESRD of unclear etiol on HD MWF (last HD earlier today), BPH s/p daily self-cath, HTN, R shoulder melanoma excision (2018), presents with chest pain.        Pt states he lives in the [place], [place]. Pt reports intermittent nightly 30 minutes of diffuse chest pain with radiation to b/l arms. These episodes only seem to occur at night and self-resolve. Pt states he was awoken with an episode of chest pain this morning, 8 out of 10 level, associated with nausea, dry heaving, diaphoresis. Denies any palpitations, SOB during these times. Pt called EMS, but refused care. He then went to have full HD at his dialysis center, and afterwards, his son came to pick him to and drove him to this hospital. Pt also reports a dry persistent cough for the past 1 month.        VS: Tm 98.4, P 78, /65, R 22, 95% RA    In the ED, received , Brilinta load, Heparin gtt, Lipitor 80. (27 May 2019 23:28)        5/29 cardiac cath with POBA to the RCA. Right radial site without swelling, bleeding.    The patient was seen by Dr. Duncan from nephrology and he underwent dialysis on 5/30/19.  Halley, , has set up dialysis for 5/31/19 at [facility] dialysis at 2pm. HPI:    83M c hx CAD s/p multiple stents (Jun '18 & Jan '19), ESRD of unclear etiol on HD MWF (last HD earlier today), BPH s/p daily self-cath, HTN, R shoulder melanoma excision (2018), presents with chest pain.        Pt states he lives in the [place], [place]. Pt reports intermittent nightly 30 minutes of diffuse chest pain with radiation to b/l arms. These episodes only seem to occur at night and self-resolve. Pt states he was awoken with an episode of chest pain this morning, 8 out of 10 level, associated with nausea, dry heaving, diaphoresis. Denies any palpitations, SOB during these times. Pt called EMS, but refused care. He then went to have full HD at his dialysis center, and afterwards, his son came to pick him to and drove him to this hospital. Pt also reports a dry persistent cough for the past 1 month.        VS: Tm 98.4, P 78, /65, R 22, 95% RA    In the ED, received , Brilinta load, Heparin gtt, Lipitor 80. (27 May 2019 23:28)        5/29 cardiac cath with POBA to the RCA. Right radial site without swelling, bleeding.    The patient was seen by Dr. Duncan from nephrology and he underwent dialysis on 5/30/19.  Halley, , has set up dialysis for 5/31/19 at [facility] dialysis at 2pm    The patient has picked up his brilinta prescription from [facility] in [place]. He lives in [place] and will follow up with his cardiologist Dr. Ruben Meadows for further refills. HPI:    83M c hx CAD s/p multiple stents (Jun '18 & Jan '19), ESRD of unclear etiol on HD MWF (last HD earlier today), BPH s/p daily self-cath, HTN, R shoulder melanoma excision (2018), presents with chest pain.        Pt states he lives in the [place], [place]. Pt reports intermittent nightly 30 minutes of diffuse chest pain with radiation to b/l arms. These episodes only seem to occur at night and self-resolve. Pt states he was awoken with an episode of chest pain this morning, 8 out of 10 level, associated with nausea, dry heaving, diaphoresis. Denies any palpitations, SOB during these times. Pt called EMS, but refused care. He then went to have full HD at his dialysis center, and afterwards, his son came to pick him to and drove him to this hospital. He has a LHC on 5/29 with POBA to the RCA. Right radial site without swelling, bleeding. The patient was seen by Dr. Duncan from nephrology and he underwent dialysis on 5/30/19.  Halley, , has set up dialysis for 5/31/19 at [facility] dialysis at 2pm. The patient has picked up his brilinta prescription from [facility] in [place]. He lives in [place] and will follow up with his cardiologist Dr. Miranda. Pt is medically stable for d/c today.

## 2019-05-30 NOTE — DISCHARGE NOTE PROVIDER - CARE PROVIDER_API CALL
Leanne Hodge)  Cardiology; Internal Medicine; Interventional Cardiology  [facility] Dept of Cardiology, [address] [place] [address]  Phone: [phone]  Fax: [phone]  Follow Up Time:     Cardiologist,   Follow up with your cardiologist at home  Phone: (   )    -  Fax: (   )    -  Follow Up Time:

## 2019-05-31 RX ORDER — CLOPIDOGREL BISULFATE 75 MG/1
75 TABLET, FILM COATED ORAL
Qty: 90 | Refills: 3 | Status: DISCONTINUED | COMMUNITY
Start: 2018-06-19 | End: 2019-05-31

## 2019-06-05 ENCOUNTER — MEDICATION RENEWAL (OUTPATIENT)
Age: 84
End: 2019-06-05

## 2019-06-10 ENCOUNTER — MEDICATION RENEWAL (OUTPATIENT)
Age: 84
End: 2019-06-10

## 2019-06-10 RX ORDER — TICAGRELOR 90 MG/1
90 TABLET ORAL
Qty: 90 | Refills: 1 | Status: DISCONTINUED | COMMUNITY
Start: 2019-05-31 | End: 2019-06-10

## 2019-06-25 ENCOUNTER — APPOINTMENT (OUTPATIENT)
Dept: CARDIOLOGY | Facility: CLINIC | Age: 84
End: 2019-06-25
Payer: MEDICARE

## 2019-06-25 VITALS
BODY MASS INDEX: 24.8 KG/M2 | DIASTOLIC BLOOD PRESSURE: 77 MMHG | OXYGEN SATURATION: 96 % | SYSTOLIC BLOOD PRESSURE: 166 MMHG | WEIGHT: 140 LBS | HEART RATE: 68 BPM | HEIGHT: 63 IN

## 2019-06-25 PROCEDURE — 99215 OFFICE O/P EST HI 40 MIN: CPT

## 2019-06-25 PROCEDURE — 93000 ELECTROCARDIOGRAM COMPLETE: CPT

## 2019-06-25 RX ORDER — CLOPIDOGREL BISULFATE 75 MG/1
75 TABLET, FILM COATED ORAL DAILY
Qty: 90 | Refills: 2 | Status: ACTIVE | COMMUNITY
Start: 2019-06-10 | End: 1900-01-01

## 2019-06-25 RX ORDER — METOPROLOL TARTRATE 25 MG/1
25 TABLET, FILM COATED ORAL TWICE DAILY
Qty: 180 | Refills: 3 | Status: COMPLETED | COMMUNITY
Start: 2018-06-19 | End: 2019-06-25

## 2019-07-10 ENCOUNTER — INPATIENT (INPATIENT)
Facility: HOSPITAL | Age: 84
LOS: 0 days | Discharge: HOME | DRG: 246 | End: 2019-07-11
Attending: INTERNAL MEDICINE | Admitting: INTERNAL MEDICINE
Payer: MEDICARE

## 2019-07-10 ENCOUNTER — TRANSCRIPTION ENCOUNTER (OUTPATIENT)
Age: 84
End: 2019-07-10

## 2019-07-10 VITALS
DIASTOLIC BLOOD PRESSURE: 72 MMHG | TEMPERATURE: 98 F | RESPIRATION RATE: 12 BRPM | WEIGHT: 141.98 LBS | SYSTOLIC BLOOD PRESSURE: 149 MMHG | OXYGEN SATURATION: 98 % | HEART RATE: 63 BPM

## 2019-07-10 DIAGNOSIS — I77.0 ARTERIOVENOUS FISTULA, ACQUIRED: Chronic | ICD-10-CM

## 2019-07-10 DIAGNOSIS — I25.10 ATHEROSCLEROTIC HEART DISEASE OF NATIVE CORONARY ARTERY WITHOUT ANGINA PECTORIS: Chronic | ICD-10-CM

## 2019-07-10 DIAGNOSIS — I25.10 ATHEROSCLEROTIC HEART DISEASE OF NATIVE CORONARY ARTERY WITHOUT ANGINA PECTORIS: ICD-10-CM

## 2019-07-10 DIAGNOSIS — C43.59 MALIGNANT MELANOMA OF OTHER PART OF TRUNK: Chronic | ICD-10-CM

## 2019-07-10 LAB
ALBUMIN SERPL ELPH-MCNC: 4.2 G/DL — SIGNIFICANT CHANGE UP (ref 3.3–5)
ALP SERPL-CCNC: 92 U/L — SIGNIFICANT CHANGE UP (ref 40–120)
ALT FLD-CCNC: 14 U/L — SIGNIFICANT CHANGE UP (ref 10–45)
ANION GAP SERPL CALC-SCNC: 15 MMOL/L — SIGNIFICANT CHANGE UP (ref 5–17)
AST SERPL-CCNC: 13 U/L — SIGNIFICANT CHANGE UP (ref 10–40)
BILIRUB SERPL-MCNC: 0.6 MG/DL — SIGNIFICANT CHANGE UP (ref 0.2–1.2)
BUN SERPL-MCNC: 46 MG/DL — HIGH (ref 7–23)
CALCIUM SERPL-MCNC: 10.4 MG/DL — SIGNIFICANT CHANGE UP (ref 8.4–10.5)
CHLORIDE SERPL-SCNC: 95 MMOL/L — LOW (ref 96–108)
CO2 SERPL-SCNC: 30 MMOL/L — SIGNIFICANT CHANGE UP (ref 22–31)
CREAT SERPL-MCNC: 8.68 MG/DL — HIGH (ref 0.5–1.3)
GLUCOSE SERPL-MCNC: 92 MG/DL — SIGNIFICANT CHANGE UP (ref 70–99)
HCT VFR BLD CALC: 36.6 % — LOW (ref 39–50)
HGB BLD-MCNC: 12.8 G/DL — LOW (ref 13–17)
MCHC RBC-ENTMCNC: 34.8 GM/DL — SIGNIFICANT CHANGE UP (ref 32–36)
MCHC RBC-ENTMCNC: 35.3 PG — HIGH (ref 27–34)
MCV RBC AUTO: 101 FL — HIGH (ref 80–100)
PLATELET # BLD AUTO: 176 K/UL — SIGNIFICANT CHANGE UP (ref 150–400)
POTASSIUM SERPL-MCNC: 5.1 MMOL/L — SIGNIFICANT CHANGE UP (ref 3.5–5.3)
POTASSIUM SERPL-SCNC: 5.1 MMOL/L — SIGNIFICANT CHANGE UP (ref 3.5–5.3)
PROT SERPL-MCNC: 7.4 G/DL — SIGNIFICANT CHANGE UP (ref 6–8.3)
RBC # BLD: 3.61 M/UL — LOW (ref 4.2–5.8)
RBC # FLD: 13.5 % — SIGNIFICANT CHANGE UP (ref 10.3–14.5)
SODIUM SERPL-SCNC: 140 MMOL/L — SIGNIFICANT CHANGE UP (ref 135–145)
WBC # BLD: 7.9 K/UL — SIGNIFICANT CHANGE UP (ref 3.8–10.5)
WBC # FLD AUTO: 7.9 K/UL — SIGNIFICANT CHANGE UP (ref 3.8–10.5)

## 2019-07-10 PROCEDURE — 92928 PRQ TCAT PLMT NTRAC ST 1 LES: CPT | Mod: RC,GC

## 2019-07-10 PROCEDURE — 77263 THER RADIOLOGY TX PLNG CPLX: CPT

## 2019-07-10 PROCEDURE — 99152 MOD SED SAME PHYS/QHP 5/>YRS: CPT | Mod: GC

## 2019-07-10 PROCEDURE — 92974 CATH PLACE CARDIO BRACHYTX: CPT | Mod: GC

## 2019-07-10 PROCEDURE — 99221 1ST HOSP IP/OBS SF/LOW 40: CPT | Mod: 25

## 2019-07-10 PROCEDURE — 77290 THER RAD SIMULAJ FIELD CPLX: CPT | Mod: 26

## 2019-07-10 PROCEDURE — 93010 ELECTROCARDIOGRAM REPORT: CPT | Mod: 77

## 2019-07-10 PROCEDURE — 77318 BRACHYTX ISODOSE COMPLEX: CPT | Mod: 26

## 2019-07-10 PROCEDURE — 93010 ELECTROCARDIOGRAM REPORT: CPT

## 2019-07-10 PROCEDURE — 77470 SPECIAL RADIATION TREATMENT: CPT | Mod: 26

## 2019-07-10 PROCEDURE — 77770 HDR RDNCL NTRSTL/ICAV BRCHTX: CPT | Mod: 26

## 2019-07-10 RX ORDER — CLOPIDOGREL BISULFATE 75 MG/1
1 TABLET, FILM COATED ORAL
Qty: 90 | Refills: 3
Start: 2019-07-10 | End: 2020-07-03

## 2019-07-10 RX ORDER — PANTOPRAZOLE SODIUM 20 MG/1
1 TABLET, DELAYED RELEASE ORAL
Qty: 0 | Refills: 0 | DISCHARGE

## 2019-07-10 RX ORDER — METOPROLOL TARTRATE 50 MG
1 TABLET ORAL
Qty: 0 | Refills: 0 | DISCHARGE

## 2019-07-10 RX ORDER — ASPIRIN/CALCIUM CARB/MAGNESIUM 324 MG
81 TABLET ORAL DAILY
Refills: 0 | Status: DISCONTINUED | OUTPATIENT
Start: 2019-07-10 | End: 2019-07-11

## 2019-07-10 RX ORDER — CALCIUM ACETATE 667 MG
2 TABLET ORAL
Qty: 0 | Refills: 0 | DISCHARGE

## 2019-07-10 RX ORDER — MULTIVIT-MIN/FERROUS GLUCONATE 9 MG/15 ML
1 LIQUID (ML) ORAL
Qty: 0 | Refills: 0 | DISCHARGE

## 2019-07-10 RX ORDER — CLOPIDOGREL BISULFATE 75 MG/1
75 TABLET, FILM COATED ORAL DAILY
Refills: 0 | Status: DISCONTINUED | OUTPATIENT
Start: 2019-07-10 | End: 2019-07-11

## 2019-07-10 RX ORDER — FERRIC CITRATE 210 MG/1
1 TABLET, COATED ORAL
Qty: 0 | Refills: 0 | DISCHARGE

## 2019-07-10 RX ORDER — PANTOPRAZOLE SODIUM 20 MG/1
1 TABLET, DELAYED RELEASE ORAL
Qty: 0 | Refills: 0 | DISCHARGE
Start: 2019-07-10

## 2019-07-10 RX ORDER — SIMVASTATIN 20 MG/1
1 TABLET, FILM COATED ORAL
Qty: 0 | Refills: 0 | DISCHARGE

## 2019-07-10 RX ORDER — PANTOPRAZOLE SODIUM 20 MG/1
40 TABLET, DELAYED RELEASE ORAL
Refills: 0 | Status: DISCONTINUED | OUTPATIENT
Start: 2019-07-10 | End: 2019-07-11

## 2019-07-10 RX ORDER — METOPROLOL TARTRATE 50 MG
50 TABLET ORAL
Refills: 0 | Status: DISCONTINUED | OUTPATIENT
Start: 2019-07-10 | End: 2019-07-11

## 2019-07-10 RX ORDER — CARVEDILOL PHOSPHATE 80 MG/1
1 CAPSULE, EXTENDED RELEASE ORAL
Qty: 0 | Refills: 0 | DISCHARGE

## 2019-07-10 RX ORDER — CLOPIDOGREL BISULFATE 75 MG/1
1 TABLET, FILM COATED ORAL
Qty: 0 | Refills: 0 | DISCHARGE

## 2019-07-10 RX ORDER — LATANOPROST 0.05 MG/ML
1 SOLUTION/ DROPS OPHTHALMIC; TOPICAL AT BEDTIME
Refills: 0 | Status: DISCONTINUED | OUTPATIENT
Start: 2019-07-10 | End: 2019-07-11

## 2019-07-10 RX ORDER — ATORVASTATIN CALCIUM 80 MG/1
80 TABLET, FILM COATED ORAL AT BEDTIME
Refills: 0 | Status: DISCONTINUED | OUTPATIENT
Start: 2019-07-10 | End: 2019-07-11

## 2019-07-10 RX ORDER — SODIUM CHLORIDE 9 MG/ML
3 INJECTION INTRAMUSCULAR; INTRAVENOUS; SUBCUTANEOUS EVERY 8 HOURS
Refills: 0 | Status: DISCONTINUED | OUTPATIENT
Start: 2019-07-10 | End: 2019-07-11

## 2019-07-10 RX ORDER — CALCIUM ACETATE 667 MG
667 TABLET ORAL
Refills: 0 | Status: DISCONTINUED | OUTPATIENT
Start: 2019-07-10 | End: 2019-07-11

## 2019-07-10 RX ORDER — LATANOPROST 0.05 MG/ML
1 SOLUTION/ DROPS OPHTHALMIC; TOPICAL
Qty: 0 | Refills: 0 | DISCHARGE

## 2019-07-10 RX ORDER — MULTIVIT-MIN/FERROUS GLUCONATE 9 MG/15 ML
1 LIQUID (ML) ORAL DAILY
Refills: 0 | Status: DISCONTINUED | OUTPATIENT
Start: 2019-07-10 | End: 2019-07-10

## 2019-07-10 RX ORDER — ATORVASTATIN CALCIUM 80 MG/1
1 TABLET, FILM COATED ORAL
Qty: 0 | Refills: 0 | DISCHARGE

## 2019-07-10 RX ORDER — CLOPIDOGREL BISULFATE 75 MG/1
600 TABLET, FILM COATED ORAL ONCE
Refills: 0 | Status: COMPLETED | OUTPATIENT
Start: 2019-07-10 | End: 2019-07-10

## 2019-07-10 RX ORDER — ASPIRIN/CALCIUM CARB/MAGNESIUM 324 MG
1 TABLET ORAL
Qty: 0 | Refills: 0 | DISCHARGE

## 2019-07-10 RX ADMIN — Medication 50 MILLIGRAM(S): at 17:58

## 2019-07-10 RX ADMIN — SODIUM CHLORIDE 3 MILLILITER(S): 9 INJECTION INTRAMUSCULAR; INTRAVENOUS; SUBCUTANEOUS at 21:26

## 2019-07-10 RX ADMIN — CLOPIDOGREL BISULFATE 600 MILLIGRAM(S): 75 TABLET, FILM COATED ORAL at 10:35

## 2019-07-10 RX ADMIN — Medication 667 MILLIGRAM(S): at 17:58

## 2019-07-10 RX ADMIN — LATANOPROST 1 DROP(S): 0.05 SOLUTION/ DROPS OPHTHALMIC; TOPICAL at 21:27

## 2019-07-10 RX ADMIN — ATORVASTATIN CALCIUM 80 MILLIGRAM(S): 80 TABLET, FILM COATED ORAL at 21:27

## 2019-07-10 RX ADMIN — SODIUM CHLORIDE 3 MILLILITER(S): 9 INJECTION INTRAMUSCULAR; INTRAVENOUS; SUBCUTANEOUS at 14:19

## 2019-07-10 NOTE — DISCHARGE NOTE PROVIDER - NSDCCPTREATMENT_GEN_ALL_CORE_FT
PRINCIPAL PROCEDURE  Procedure: Left heart catheterization  Findings and Treatment: s/p LHC ALBERT x 1pRCA & POBA mRCA via RRA

## 2019-07-10 NOTE — DISCHARGE NOTE PROVIDER - CARE PROVIDER_API CALL
Kvng Hernandez)  Cardiovascular Disease; Interventional Cardiology  [address] [place] [address]  Phone: [phone]  Fax: [phone]  Follow Up Time:

## 2019-07-10 NOTE — DISCHARGE NOTE PROVIDER - HOSPITAL COURSE
82 yo  male iwth no implantable devices (he lives in the [place], [place]) with PMH HTN, CAD s/p multiple stents (Jun '18 & Jan '19), ESRD of unclear etiol on HD MWF (last HD Monday on MWF schedule at [facility] in [place]), BPH he does daily self-catheterization presents today for brachytherapy to RCA. Patient's last intervention was on 5/29/19 POBA to pRCA with Dr. Hodge. He currently denies chest pain, palpitations, SOB, edema, PND or orthopnea. Pt is now s/p C ALBERT x 1 pRCA and POBA to mRCA via RRA. Pt tolerated the procedure well, site benign. Post-procedure discharge instructions discussed and qiestions addressed

## 2019-07-10 NOTE — PROGRESS NOTE ADULT - ASSESSMENT
84 yo  male iwth no implantable devices (he lives in the [place], [place]) with PMH HTN, CAD s/p multiple stents (Jun '18 & Jan '19), ESRD underwent coronary angiogram for RCA stent       1- esrd  2- cad  3- shpt       hd due today   hd consent obtained witnessed and placed in chart  cont asa/plavix  cont metropolol   phoslo 2 tab with meals

## 2019-07-10 NOTE — DISCHARGE NOTE PROVIDER - NSDCCPCAREPLAN_GEN_ALL_CORE_FT
PRINCIPAL DISCHARGE DIAGNOSIS  Diagnosis: CAD (coronary artery disease)  Assessment and Plan of Treatment: Pt remains chest pain free and understands post cath discharge instructions   No heavy lifting or pushing/pulling with procedure arm for 2 weeks. No driving for 2 days. You may shower 24 hours following the procedure but avoid baths/swimming for 1 week. Check your wrist site for bleeding and/or swelling daily following procedure and call your doctor immediately if it occurs or if you experience increased pain at the site. Follow up with your cardiologist in 1-2 weeks. You may call [facility] if you have any questions/concerns regarding your procedure [phone].

## 2019-07-10 NOTE — H&P CARDIOLOGY - BP NONINVASIVE MEAN (MM HG)
97
Review of the patient's medical chart, Transfer sheets from [facility], RN/other (specify)

## 2019-07-10 NOTE — PROGRESS NOTE ADULT - SUBJECTIVE AND OBJECTIVE BOX
[facility]  [phone]  NEPHROLOGY      INITIAL CONSULT NOTE  --------------------------------------------------------------------------------  HPI:      84 yo  male iwth no implantable devices (he lives in the [place], [place]) with PMH HTN, CAD s/p multiple stents (Jun '18 & Jan '19), ESRD of unclear etiol on HD MWF (last HD Monday on MWF schedule at [facility] in [place]), BPH he does daily self-catheterization presents today for brachytherapy to RCA. Patient's last intervention was on 5/29/19 POBA to pRCA with Dr. Hodge. He currently denies chest pain, palpitations, SOB, edema, PND or orthopnea s/p coronary angio renal consult called.             PAST HISTORY  --------------------------------------------------------------------------------  PAST MEDICAL & SURGICAL HISTORY:  Bladder obstruction: with UTI in 6/2018  Stented coronary artery  Hypertension  Malignant melanoma of right upper limb, including shoulder  Glaucoma  Cystitis  NSTEMI (non-ST elevated myocardial infarction)  CAD (coronary artery disease)  ESRD (end stage renal disease) on dialysis  BPH (benign prostatic hyperplasia)  Malignant melanoma of back: removal in 10/2018  CAD (coronary artery disease): x2 cardiac stent, 1st stent placed in 6/7/18 and 6/20/18 at [facility]  AV fistula: 2016    FAMILY HISTORY:  No pertinent family history in first degree relatives    PAST SOCIAL HISTORY:   denies tobacco use   ALLERGIES & MEDICATIONS  --------------------------------------------------------------------------------  Allergies    No Known Allergies    Intolerances      Standing Inpatient Medications  aspirin  chewable 81 milliGRAM(s) Oral daily  atorvastatin 80 milliGRAM(s) Oral at bedtime  calcium acetate 667 milliGRAM(s) Oral three times a day with meals  clopidogrel Tablet 75 milliGRAM(s) Oral daily  latanoprost 0.005% Ophthalmic Solution 1 Drop(s) Both EYES at bedtime  metoprolol tartrate 50 milliGRAM(s) Oral two times a day  pantoprazole    Tablet 40 milliGRAM(s) Oral before breakfast  sodium chloride 0.9% lock flush 3 milliLiter(s) IV Push every 8 hours    PRN Inpatient Medications      REVIEW OF SYSTEMS  --------------------------------------------------------------------------------  Gen: No  fevers/chills   Skin: No rashes  Head/Eyes/Ears/Mouth: No headache; Normal hearing;  No sinus pain/discomfort, sore throat  Respiratory: No dyspnea, cough, wheezing   CV: No chest pain, orthopnea  GI: No abdominal pain, diarrhea, nausea, vomiting  : No dysuria  MSK: No joint pain/swelling; no back pain  Neuro: No dizziness/lightheadedness,   also with no edema     All other systems were reviewed and are negative, except as noted.    VITALS/PHYSICAL EXAM  --------------------------------------------------------------------------------  T(C): 36.8 (07-10-19 @ 21:56), Max: 36.8 (07-10-19 @ 20:15)  HR: 65 (07-10-19 @ 21:56) (61 - 69)  BP: 137/63 (07-10-19 @ 21:56) (116/99 - 170/72)  RR: 18 (07-10-19 @ 21:56) (12 - 48)  SpO2: 96% (07-10-19 @ 21:56) (96% - 100%)  Wt(kg): --  Height (cm): 162.56 (07-10-19 @ 12:55)  Weight (kg): 63.8 (07-10-19 @ 12:55)  BMI (kg/m2): 24.1 (07-10-19 @ 12:55)  BSA (m2): 1.68 (07-10-19 @ 12:55)      07-10-19 @ 07:01  -  07-10-19 @ 23:42  --------------------------------------------------------  IN: 280 mL / OUT: 0 mL / NET: 280 mL      Physical Exam:  	Gen: Non toxic comfortable appearing   	no jvd ,  	Pulm: decrease bs  no rales or ronchi or wheezing  	CV: RRR, S1S2; no rub  	Back: No CVA tenderness; no sacral edema  	Abd: +BS, soft, nontender/nondistended  	: No suprapubic tenderness  	UE: Warm, no cyanosis  no clubbing,  no edema  	LE: Warm, no cyanosis  no clubbing, no edema  	Neuro: alert and oriented. speech coherent   		Skin: Warm, no decrease skin turgor   	    LABS/STUDIES  --------------------------------------------------------------------------------              12.8   7.9   >-----------<  176      [07-10-19 @ 08:43]              36.6     140  |  95  |  46  ----------------------------<  92      [07-10-19 @ 08:43]  5.1   |  30  |  8.68        Ca     10.4     [07-10-19 @ 08:43]    TPro  7.4  /  Alb  4.2  /  TBili  0.6  /  DBili  x   /  AST  13  /  ALT  14  /  AlkPhos  92  [07-10-19 @ 08:43]          Creatinine Trend:  SCr 8.68 [07-10 @ 08:43]    Urinalysis - [06-07-18 @ 07:23]      Color Yellow / Appearance Clear / SG 1.012 / pH >9.0      Gluc 100 / Ketone Negative  / Bili Negative / Urobili Negative       Blood Small / Protein 300 / Leuk Est Moderate / Nitrite Negative      RBC 5-10 / WBC >50 / Hyaline  / Gran  / Sq Epi  / Non Sq Epi Few / Bacteria       HbA1c 5.0      [06-07-18 @ 11:06]  Lipid: chol 140, TG 84, HDL 38, LDL 85      [05-28-19 @ 08:44]    HBsAb <3.0      [01-25-19 @ 10:31]  HBsAg Nonreact      [01-25-19 @ 10:31]  HBcAb Nonreact      [01-25-19 @ 10:31]  HCV 0.13, Nonreact      [01-25-19 @ 10:31]

## 2019-07-10 NOTE — H&P CARDIOLOGY - HISTORY OF PRESENT ILLNESS
82 yo  male (he lives in the [place], [place]) with PMH HTN, CAD s/p multiple stents (Jun '18 & Jan '19), ESRD of unclear etiol on HD MWF (last HD Monday on MWF schedule at [facility] in [place]), BPH he does daily self-catheterization presents today for brachytherapy to RCA. Patient's last intervention was on 5/29/19 POBA to pRCA with Dr. Hodge. He currently denies chest pain, palpitations, SOB, edema, PND or orthopnea    5/29/19  LM:   --  LM: Normal.  LAD:   --  D1: There was a diffuse 30 % stenosis at the site of a prior  angioplasty with stent placement.  CX:   --  Circumflex: Angiography showed mild atherosclerosis with no flow  limiting lesions.  RCA:   --  Proximal RCA: There was a diffuse 95 % stenosis at the site of a  prior angioplasty with stent placement. There was KATLYN grade 3 flow  through the vessel (brisk flow). This is a likely culprit for the  patient's clinical presentation. An intervention was performed.  COMPLICATIONS: There were no complications.  DIAGNOSTIC RECOMMENDATIONS:  1. Successful POBA to the pRCA with a 4.5mm NC balloon in the setting of  angina.  2. DAPT.  INTERVENTIONAL RECOMMENDATIONS:  1. Successful POBA to the pRCA with a 4.5mm NC balloon in the setting of  angina.  2. DAPT.  Prepared and signed by  Leanne Hodge M.D.  Signed 05/30/2019 11:34:31 82 yo  male iwth no implantable devices (he lives in the [place], [place]) with PMH HTN, CAD s/p multiple stents (Jun '18 & Jan '19), ESRD of unclear etiol on HD MWF (last HD Monday on MWF schedule at [facility] in [place]), BPH he does daily self-catheterization presents today for brachytherapy to RCA. Patient's last intervention was on 5/29/19 POBA to pRCA with Dr. Hodge. He currently denies chest pain, palpitations, SOB, edema, PND or orthopnea    5/29/19  LM:   --  LM: Normal.  LAD:   --  D1: There was a diffuse 30 % stenosis at the site of a prior  angioplasty with stent placement.  CX:   --  Circumflex: Angiography showed mild atherosclerosis with no flow  limiting lesions.  RCA:   --  Proximal RCA: There was a diffuse 95 % stenosis at the site of a  prior angioplasty with stent placement. There was KATLYN grade 3 flow  through the vessel (brisk flow). This is a likely culprit for the  patient's clinical presentation. An intervention was performed.  COMPLICATIONS: There were no complications.  DIAGNOSTIC RECOMMENDATIONS:  1. Successful POBA to the pRCA with a 4.5mm NC balloon in the setting of  angina.  2. DAPT.  INTERVENTIONAL RECOMMENDATIONS:  1. Successful POBA to the pRCA with a 4.5mm NC balloon in the setting of  angina.  2. DAPT.  Prepared and signed by  Leanne Hodge M.D.  Signed 05/30/2019 11:34:31

## 2019-07-10 NOTE — H&P CARDIOLOGY - PSH
AV fistula  2016  CAD (coronary artery disease)  x2 cardiac stent, 1st stent placed in 6/7/18 and 6/20/18 at [facility]  Malignant melanoma of back  removal in 10/2018

## 2019-07-11 ENCOUNTER — TRANSCRIPTION ENCOUNTER (OUTPATIENT)
Age: 84
End: 2019-07-11

## 2019-07-11 VITALS
RESPIRATION RATE: 16 BRPM | TEMPERATURE: 98 F | SYSTOLIC BLOOD PRESSURE: 131 MMHG | HEART RATE: 63 BPM | OXYGEN SATURATION: 100 % | DIASTOLIC BLOOD PRESSURE: 68 MMHG

## 2019-07-11 LAB
ANION GAP SERPL CALC-SCNC: 15 MMOL/L — SIGNIFICANT CHANGE UP (ref 5–17)
BUN SERPL-MCNC: 18 MG/DL — SIGNIFICANT CHANGE UP (ref 7–23)
CALCIUM SERPL-MCNC: 9.9 MG/DL — SIGNIFICANT CHANGE UP (ref 8.4–10.5)
CHLORIDE SERPL-SCNC: 93 MMOL/L — LOW (ref 96–108)
CO2 SERPL-SCNC: 29 MMOL/L — SIGNIFICANT CHANGE UP (ref 22–31)
CREAT SERPL-MCNC: 4.17 MG/DL — HIGH (ref 0.5–1.3)
GLUCOSE SERPL-MCNC: 106 MG/DL — HIGH (ref 70–99)
HBV CORE IGM SER-ACNC: SIGNIFICANT CHANGE UP
HBV SURFACE AB SER-ACNC: <3 MIU/ML — LOW
HBV SURFACE AG SER-ACNC: SIGNIFICANT CHANGE UP
HCT VFR BLD CALC: 36.4 % — LOW (ref 39–50)
HCV AB S/CO SERPL IA: 0.14 S/CO — SIGNIFICANT CHANGE UP (ref 0–0.99)
HCV AB SERPL-IMP: SIGNIFICANT CHANGE UP
HGB BLD-MCNC: 13.2 G/DL — SIGNIFICANT CHANGE UP (ref 13–17)
MAGNESIUM SERPL-MCNC: 2.1 MG/DL — SIGNIFICANT CHANGE UP (ref 1.6–2.6)
MCHC RBC-ENTMCNC: 36.3 GM/DL — HIGH (ref 32–36)
MCHC RBC-ENTMCNC: 36.3 PG — HIGH (ref 27–34)
MCV RBC AUTO: 100 FL — SIGNIFICANT CHANGE UP (ref 80–100)
PLATELET # BLD AUTO: 167 K/UL — SIGNIFICANT CHANGE UP (ref 150–400)
POTASSIUM SERPL-MCNC: 3.7 MMOL/L — SIGNIFICANT CHANGE UP (ref 3.5–5.3)
POTASSIUM SERPL-SCNC: 3.7 MMOL/L — SIGNIFICANT CHANGE UP (ref 3.5–5.3)
RBC # BLD: 3.64 M/UL — LOW (ref 4.2–5.8)
RBC # FLD: 13.3 % — SIGNIFICANT CHANGE UP (ref 10.3–14.5)
SODIUM SERPL-SCNC: 137 MMOL/L — SIGNIFICANT CHANGE UP (ref 135–145)
WBC # BLD: 9.1 K/UL — SIGNIFICANT CHANGE UP (ref 3.8–10.5)
WBC # FLD AUTO: 9.1 K/UL — SIGNIFICANT CHANGE UP (ref 3.8–10.5)

## 2019-07-11 PROCEDURE — 77470 SPECIAL RADIATION TREATMENT: CPT

## 2019-07-11 PROCEDURE — 86803 HEPATITIS C AB TEST: CPT

## 2019-07-11 PROCEDURE — C1717: CPT

## 2019-07-11 PROCEDURE — C1728: CPT

## 2019-07-11 PROCEDURE — 92974 CATH PLACE CARDIO BRACHYTX: CPT

## 2019-07-11 PROCEDURE — 80053 COMPREHEN METABOLIC PANEL: CPT

## 2019-07-11 PROCEDURE — C9600: CPT | Mod: RC

## 2019-07-11 PROCEDURE — 80048 BASIC METABOLIC PNL TOTAL CA: CPT

## 2019-07-11 PROCEDURE — 87340 HEPATITIS B SURFACE AG IA: CPT

## 2019-07-11 PROCEDURE — 83735 ASSAY OF MAGNESIUM: CPT

## 2019-07-11 PROCEDURE — 99152 MOD SED SAME PHYS/QHP 5/>YRS: CPT

## 2019-07-11 PROCEDURE — 86706 HEP B SURFACE ANTIBODY: CPT

## 2019-07-11 PROCEDURE — 86705 HEP B CORE ANTIBODY IGM: CPT

## 2019-07-11 PROCEDURE — C1894: CPT

## 2019-07-11 PROCEDURE — C1887: CPT

## 2019-07-11 PROCEDURE — 99153 MOD SED SAME PHYS/QHP EA: CPT

## 2019-07-11 PROCEDURE — C1725: CPT

## 2019-07-11 PROCEDURE — 99261: CPT

## 2019-07-11 PROCEDURE — 77770 HDR RDNCL NTRSTL/ICAV BRCHTX: CPT

## 2019-07-11 PROCEDURE — C1874: CPT

## 2019-07-11 PROCEDURE — 77318 BRACHYTX ISODOSE COMPLEX: CPT

## 2019-07-11 PROCEDURE — 77370 RADIATION PHYSICS CONSULT: CPT

## 2019-07-11 PROCEDURE — C1769: CPT

## 2019-07-11 PROCEDURE — 85027 COMPLETE CBC AUTOMATED: CPT

## 2019-07-11 PROCEDURE — 77290 THER RAD SIMULAJ FIELD CPLX: CPT

## 2019-07-11 PROCEDURE — 93005 ELECTROCARDIOGRAM TRACING: CPT

## 2019-07-11 RX ORDER — ZALEPLON 10 MG
5 CAPSULE ORAL AT BEDTIME
Refills: 0 | Status: DISCONTINUED | OUTPATIENT
Start: 2019-07-11 | End: 2019-07-11

## 2019-07-11 RX ADMIN — Medication 81 MILLIGRAM(S): at 05:04

## 2019-07-11 RX ADMIN — PANTOPRAZOLE SODIUM 40 MILLIGRAM(S): 20 TABLET, DELAYED RELEASE ORAL at 05:05

## 2019-07-11 RX ADMIN — Medication 50 MILLIGRAM(S): at 05:06

## 2019-07-11 RX ADMIN — CLOPIDOGREL BISULFATE 75 MILLIGRAM(S): 75 TABLET, FILM COATED ORAL at 05:04

## 2019-07-11 RX ADMIN — Medication 667 MILLIGRAM(S): at 07:19

## 2019-07-11 RX ADMIN — SODIUM CHLORIDE 3 MILLILITER(S): 9 INJECTION INTRAMUSCULAR; INTRAVENOUS; SUBCUTANEOUS at 05:07

## 2019-07-11 NOTE — PROGRESS NOTE ADULT - ASSESSMENT
Patient is a 83y old  Male who presents with a chief complaint of CAD (10 Jul 2019 19:24) now s/p C ALBERT x 1 mRCA  and POBA to mRCA via RRA access. Pt tolerated the procedure well, site stable. Discharge pending

## 2019-07-11 NOTE — HISTORY OF PRESENT ILLNESS
[FreeTextEntry1] : Mr. Connolly is an 83 year-old man with known multivessel CAD who is s/p RCA atherectomy with 2 ALBERT. He has been feeling better since his PCI. Some musculoskeletal discomfort. He has been doing all activities of daily living. Needs melanoma resection.

## 2019-07-11 NOTE — PHYSICAL EXAM
[General Appearance - Well Developed] : well developed [Well Groomed] : well groomed [Normal Appearance] : normal appearance [No Deformities] : no deformities [General Appearance - Well Nourished] : well nourished [General Appearance - In No Acute Distress] : no acute distress [Normal Conjunctiva] : the conjunctiva exhibited no abnormalities [Eyelids - No Xanthelasma] : the eyelids demonstrated no xanthelasmas [Normal Oral Mucosa] : normal oral mucosa [No Oral Pallor] : no oral pallor [No Oral Cyanosis] : no oral cyanosis [Normal Jugular Venous A Waves Present] : normal jugular venous A waves present [Normal Jugular Venous V Waves Present] : normal jugular venous V waves present [No Jugular Venous Loo A Waves] : no jugular venous loo A waves [Respiration, Rhythm And Depth] : normal respiratory rhythm and effort [Auscultation Breath Sounds / Voice Sounds] : lungs were clear to auscultation bilaterally [Exaggerated Use Of Accessory Muscles For Inspiration] : no accessory muscle use [Heart Rate And Rhythm] : heart rate and rhythm were normal [Heart Sounds] : normal S1 and S2 [Murmurs] : no murmurs present [Abdomen Soft] : soft [Abdomen Tenderness] : non-tender [Abdomen Mass (___ Cm)] : no abdominal mass palpated [Abnormal Walk] : normal gait [Gait - Sufficient For Exercise Testing] : the gait was sufficient for exercise testing [Nail Clubbing] : no clubbing of the fingernails [Petechial Hemorrhages (___cm)] : no petechial hemorrhages [Cyanosis, Localized] : no localized cyanosis [] : no rash [Skin Color & Pigmentation] : normal skin color and pigmentation [No Venous Stasis] : no venous stasis [No Skin Ulcers] : no skin ulcer [Skin Lesions] : no skin lesions [No Xanthoma] : no  xanthoma was observed [Oriented To Time, Place, And Person] : oriented to person, place, and time [Affect] : the affect was normal [Mood] : the mood was normal [No Anxiety] : not feeling anxious

## 2019-07-11 NOTE — DISCHARGE NOTE NURSING/CASE MANAGEMENT/SOCIAL WORK - NSDCDPATPORTLINK_GEN_ALL_CORE
You can access the [facility] Patient Portal, offered by [facility], by registering with the following website: [email or web address]

## 2019-07-11 NOTE — PROGRESS NOTE ADULT - SUBJECTIVE AND OBJECTIVE BOX
Patient is a 83y old  Male who presents with a chief complaint of CAD (10 Jul 2019 19:24) now s/p C ALBERT x 1 mRCA  and POBA to mRCA via RRA access.           Allergies    No Known Allergies    Intolerances        Medications:  aspirin  chewable 81 milliGRAM(s) Oral daily  atorvastatin 80 milliGRAM(s) Oral at bedtime  calcium acetate 667 milliGRAM(s) Oral three times a day with meals  clopidogrel Tablet 75 milliGRAM(s) Oral daily  latanoprost 0.005% Ophthalmic Solution 1 Drop(s) Both EYES at bedtime  metoprolol tartrate 50 milliGRAM(s) Oral two times a day  pantoprazole    Tablet 40 milliGRAM(s) Oral before breakfast  sodium chloride 0.9% lock flush 3 milliLiter(s) IV Push every 8 hours  zaleplon 5 milliGRAM(s) Oral at bedtime PRN      Vitals:  T(C): 36.4 (19 @ 00:56), Max: 36.8 (07-10-19 @ 20:15)  HR: 60 (19 @ 01:43) (60 - 69)  BP: 141/52 (19 @ 01:43) (116/99 - 170/72)  BP(mean): 97 (07-10-19 @ 08:24) (97 - 97)  RR: 17 (19 @ 00:56) (12 - 48)  SpO2: 97% (19 @ 00:56) (96% - 100%)  Wt(kg): --  Daily Height in cm: 162.56 (10 Jul 2019 12:55)    Daily Weight in k.5 (2019 00:56)  I&O's Summary    10 Jul 2019 07:01  -  2019 03:36  --------------------------------------------------------  IN: 280 mL / OUT: 1500 mL / NET: -1220 mL          Physical Exam:  Appearance: Normal  Eyes: PERRL, EOMI  HENT: Normal oral mus[place]a, NC/AT  Cardiovascular: S1S2, RRR, No M/R/G, no JVD, No Lower extremity edema  Procedural Access Site: RRA access. No hematoma, Non-tender to palpation, 2+ pulse, No bruit, No Ecchymosis  Respiratory: Clear to auscultation bilaterally  Gastrointestinal: Soft, Non tender, Normal Bowel Sounds  Musculoskeletal: No clubbing, No joint deformity   Neurologic: Non-focal  Lymphatic: No lymphadenopathy  Psychiatry: AAOx3, Mood & affect appropriate  Skin: No rashes, No ecchymoses, No cyanosis        137  |  93<L>  |  18  ----------------------------<  106<H>  3.7   |  29  |  4.17<H>    Ca    9.9      2019 01:56  Mg     2.1         TPro  7.4  /  Alb  4.2  /  TBili  0.6  /  DBili  x   /  AST  13  /  ALT  14  /  AlkPhos  92  07-10        Interpretation of Telemetry:

## 2019-07-11 NOTE — DISCUSSION/SUMMARY
[FreeTextEntry1] : Mr. Connolly is an 83 year old man with CAD.\par \par Plan:\par 1. Given the CAD c/w DAPT. Will schedule for brachytherapy given the RCA restenosis.\par 2. Secondary prev med. \par 3. Old records requested and reviewed with performing physician.\par 4. Primary and secondary prevention of cardiovascular and related conditions discussed at length, including but not limited to diet and lifestyle modification.\par 5. Patient to return to the office in 2-3 months.\par \par Thank you for allowing me to participate in the care of your patient. If you have any questions, please feel free to contact me at [phone] or via email at [email or web address].\par \par Sincerely,\par \par Leanne Hodge MD FAC

## 2020-02-06 NOTE — ASU PREOP CHECKLIST - DNR CLARIFICATION FORM COMPLETED
PGY1 Note      Patient seen at bedside for cervical change, comfortable with epidural.    VS  T(C): 36.6 (02-05-20 @ 22:01)  HR: 92 (02-05-20 @ 23:56)  BP: 127/69 (02-05-20 @ 23:55)  RR: 18 (02-05-20 @ 18:44)  SpO2: 100% (02-05-20 @ 23:56)    SVE: 5/90/-3  FHR: 150/mod shaheed/-acel/+variable decel: cat 2  [place]: irregular ctx      patient presented in labor  -IUPC/amnio for variable decelerations  -once tracing improves, start pitocin    d/w Dr. Shaheen Kaur PGY1
R1 Progress Note    Pt checked for cervical change.     Vital Signs Last 24 Hrs  T(C): 37.2 (05 Feb 2020 20:29), Max: 37.2 (05 Feb 2020 20:29)  HR: 111 (05 Feb 2020 21:26) (85 - 124)  BP: 131/71 (05 Feb 2020 21:08) (118/62 - 139/72)  RR: 18 (05 Feb 2020 18:44) (16 - 18)  SpO2: 99% (05 Feb 2020 21:21) (91% - 100%)    /mod/+accel/-decel  [place] q6-7min  VE 5/90/-3    4AROM/pit    D/w Dr. Jamison and Dr. Jeffy Borges PGY-1
R4 OB Progress Note    Patient seen and examined at bedside for cat II tracing.  Patient with rigors and warm to the touch but rectal T 37.8.  ISE placed, not working.  Amnioinfusion bolusing.  1L IVF bolus.  ISE placed but not functioning, switched back to external monitor    T(C): 37.8 (20 @ 01:06), Max: 37.8 (20 @ 01:06)  HR: 110 (20 @ 01:31) (85 - 125)  BP: 131/72 (20 @ 01:24) (118/62 - 139/72)  RR: 18 (20 @ 18:44) (16 - 18)  SpO2: 100% (20 @ 01:31) (91% - 100%)    SVE 5-6/80/-2    FHT bl 160, mod variability, accels, variable decels  TOCO ctx q 2-3 min    AP: 31y y/o  at 38w1d in labor with cat II tracing, suspicion for intraamniotic infection but not yet febrile  -continue amnioinfusion, lateral positioning, supp o2, IVF  -Dr Jamison en route for  delivery for cat II tracing remote from delivery    JOSHUA Michel PGY4
n/a

## 2020-03-09 ENCOUNTER — RX RENEWAL (OUTPATIENT)
Age: 85
End: 2020-03-09

## 2020-03-13 RX ORDER — ATORVASTATIN CALCIUM 80 MG/1
80 TABLET, FILM COATED ORAL DAILY
Qty: 90 | Refills: 1 | Status: ACTIVE | COMMUNITY
Start: 2018-06-19 | End: 1900-01-01

## 2020-08-26 ENCOUNTER — RX RENEWAL (OUTPATIENT)
Age: 85
End: 2020-08-26

## 2021-02-08 NOTE — PATIENT PROFILE ADULT. - NS PRO PT RIGHT SUPPORT PERSON
Brief Postoperative Note    Patient: Beth Park  YOB: 1942  MRN: [id number]    Date of Procedure: 2/8/2021     Pre-Op Diagnosis: BLADDER CANCER    Post-Op Diagnosis: Same as preoperative diagnosis.       Procedure(s):  REPEAT TRANSURETHRAL RESECTION OF BLADDER TUMOR (GENERAL/SPINAL)  5 cm large  Surgeon(s):  Deloris Anthony MD    Surgical Assistant: None    Anesthesia: General     Estimated Blood Loss (mL): less than 025     Complications: None    Specimens:   ID Type Source Tests Collected by Time Destination   1 : Repeat transuretheral resection tumor 5 CM Fresh Bladder  Deloris Anthony MD 2/8/2021 1000 Pathology        Implants: * No implants in log *    Drains: * No LDAs found *    Findings: larger area of abnl mucosa r floor    Electronically Signed by Ajay Cool MD on 2/8/2021 at 10:23 AM same name as above

## 2021-03-30 ENCOUNTER — APPOINTMENT (OUTPATIENT)
Dept: CARDIOLOGY | Facility: CLINIC | Age: 86
End: 2021-03-30
Payer: MEDICARE

## 2021-03-30 ENCOUNTER — NON-APPOINTMENT (OUTPATIENT)
Age: 86
End: 2021-03-30

## 2021-03-30 VITALS
SYSTOLIC BLOOD PRESSURE: 196 MMHG | BODY MASS INDEX: 26.22 KG/M2 | WEIGHT: 148 LBS | DIASTOLIC BLOOD PRESSURE: 71 MMHG | HEIGHT: 63 IN | HEART RATE: 61 BPM | OXYGEN SATURATION: 100 %

## 2021-03-30 DIAGNOSIS — N18.6 END STAGE RENAL DISEASE: ICD-10-CM

## 2021-03-30 PROCEDURE — 93000 ELECTROCARDIOGRAM COMPLETE: CPT

## 2021-03-30 PROCEDURE — 99214 OFFICE O/P EST MOD 30 MIN: CPT

## 2021-03-30 RX ORDER — CARVEDILOL 12.5 MG/1
12.5 TABLET, FILM COATED ORAL TWICE DAILY
Qty: 60 | Refills: 0 | Status: ACTIVE | COMMUNITY
Start: 2019-06-25 | End: 1900-01-01

## 2021-04-02 PROBLEM — N18.6 ESRD (END STAGE RENAL DISEASE): Status: ACTIVE | Noted: 2018-06-19

## 2021-04-02 NOTE — HISTORY OF PRESENT ILLNESS
[FreeTextEntry1] : Mr. Connolly is an 85 year-old man with known multivessel CAD who is s/p RCA atherectomy with 2 ALBERT. He has been feeling better since his PCI. Some musculoskeletal discomfort. He has been doing all activities of daily living. Needs melanoma resection. His BP has been significantly elevated for a few days and his meds have not changed.

## 2021-04-02 NOTE — DISCUSSION/SUMMARY
[FreeTextEntry1] : Mr. Connolly is an 85 year old man with CAD.\par \par Plan:\par 1. Given the CAD c/w DAPT. Will need better BP control. Start carvedilol.\par 2. Secondary prev med. \par 3. Old records requested and reviewed with performing physician.\par 4. Primary and secondary prevention of cardiovascular and related conditions discussed at length, including but not limited to diet and lifestyle modification.\par 5. Patient to return to the office in 2-3 months.\par \par Thank you for allowing me to participate in the care of your patient. If you have any questions, please feel free to contact me at [phone] or via email at [email or web address].\par \par Sincerely,\par \par Leanne Hodge MD [facility]

## 2021-04-02 NOTE — PHYSICAL EXAM
[General Appearance - Well Developed] : well developed [Normal Appearance] : normal appearance [Well Groomed] : well groomed [General Appearance - Well Nourished] : well nourished [No Deformities] : no deformities [General Appearance - In No Acute Distress] : no acute distress [Normal Conjunctiva] : the conjunctiva exhibited no abnormalities [Eyelids - No Xanthelasma] : the eyelids demonstrated no xanthelasmas [Normal Oral Mucosa] : normal oral mucosa [No Oral Pallor] : no oral pallor [No Oral Cyanosis] : no oral cyanosis [Normal Jugular Venous A Waves Present] : normal jugular venous A waves present [Normal Jugular Venous V Waves Present] : normal jugular venous V waves present [No Jugular Venous Loo A Waves] : no jugular venous loo A waves [Exaggerated Use Of Accessory Muscles For Inspiration] : no accessory muscle use [Respiration, Rhythm And Depth] : normal respiratory rhythm and effort [Auscultation Breath Sounds / Voice Sounds] : lungs were clear to auscultation bilaterally [Heart Rate And Rhythm] : heart rate and rhythm were normal [Heart Sounds] : normal S1 and S2 [Murmurs] : no murmurs present [Abdomen Soft] : soft [Abdomen Tenderness] : non-tender [Abdomen Mass (___ Cm)] : no abdominal mass palpated [Gait - Sufficient For Exercise Testing] : the gait was sufficient for exercise testing [Abnormal Walk] : normal gait [Cyanosis, Localized] : no localized cyanosis [Nail Clubbing] : no clubbing of the fingernails [Petechial Hemorrhages (___cm)] : no petechial hemorrhages [Skin Color & Pigmentation] : normal skin color and pigmentation [] : no rash [No Venous Stasis] : no venous stasis [Skin Lesions] : no skin lesions [No Skin Ulcers] : no skin ulcer [No Xanthoma] : no  xanthoma was observed [Oriented To Time, Place, And Person] : oriented to person, place, and time [Affect] : the affect was normal [Mood] : the mood was normal [No Anxiety] : not feeling anxious

## 2021-04-13 ENCOUNTER — APPOINTMENT (OUTPATIENT)
Dept: CARDIOLOGY | Facility: CLINIC | Age: 86
End: 2021-04-13
Payer: MEDICARE

## 2021-04-13 VITALS
WEIGHT: 148 LBS | OXYGEN SATURATION: 100 % | DIASTOLIC BLOOD PRESSURE: 68 MMHG | BODY MASS INDEX: 26.22 KG/M2 | HEIGHT: 63 IN | HEART RATE: 66 BPM | SYSTOLIC BLOOD PRESSURE: 193 MMHG

## 2021-04-13 DIAGNOSIS — I10 ESSENTIAL (PRIMARY) HYPERTENSION: ICD-10-CM

## 2021-04-13 DIAGNOSIS — I25.810 ATHEROSCLEROSIS OF CORONARY ARTERY BYPASS GRAFT(S) W/OUT ANGINA PECTORIS: ICD-10-CM

## 2021-04-13 PROCEDURE — 93000 ELECTROCARDIOGRAM COMPLETE: CPT

## 2021-04-13 PROCEDURE — 99214 OFFICE O/P EST MOD 30 MIN: CPT

## 2021-04-13 RX ORDER — LISINOPRIL 20 MG/1
20 TABLET ORAL DAILY
Qty: 3 | Refills: 3 | Status: ACTIVE | COMMUNITY
Start: 2021-04-13 | End: 1900-01-01

## 2021-04-22 PROBLEM — I25.810 ATHEROSCLEROSIS OF OTHER CORONARY ARTERY BYPASS GRAFT: Status: ACTIVE | Noted: 2018-06-19

## 2021-04-22 NOTE — DISCUSSION/SUMMARY
[FreeTextEntry1] : Mr. Connolly is an 85 year old man with CAD.\par \par Plan:\par 1. Given the CAD c/w DAPT. Will need better BP control. Started carvedilol and addded lisinopril - renal team to elias HARVEY on next dialysis session\par 2. Secondary prev med. \par 3. Old records requested and reviewed with performing physician.\par 4. Primary and secondary prevention of cardiovascular and related conditions discussed at length, including but not limited to diet and lifestyle modification.\par 5. Patient to return to the office in 2-3 months.\par \par Thank you for allowing me to participate in the care of your patient. If you have any questions, please feel free to contact me at [phone] or via email at [email or web address].\par \par Sincerely,\par \par Leanne Hodge MD [facility]

## 2021-04-22 NOTE — PHYSICAL EXAM

## 2021-07-06 NOTE — H&P PST ADULT - NEGATIVE PSYCHIATRIC SYMPTOMS
Initiate Treatment: Ammonium lactate Render In Strict Bullet Format?: No Detail Level: Zone no suicidal ideation/no depression/no anxiety/no insomnia

## 2021-07-20 NOTE — DISCHARGE NOTE PROVIDER - NSDCQMSTATINC_CARD_A_CORE
Quality 111:Pneumonia Vaccination Status For Older Adults: Pneumococcal Vaccination not Administered or Previously Received, Reason not Otherwise Specified
Detail Level: Detailed
Quality 130: Documentation Of Current Medications In The Medical Record: Current Medications Documented
Quality 110: Preventive Care And Screening: Influenza Immunization: Influenza Immunization Ordered or Recommended, but not Administered due to system reason
Yes

## 2021-10-06 PROBLEM — I10 ESSENTIAL HYPERTENSION: Status: ACTIVE | Noted: 2018-06-19

## 2022-06-06 NOTE — DISCHARGE NOTE ADULT - NSCORESITESY/N_GEN_A_CORE_RD
Quality 110: Preventive Care And Screening: Influenza Immunization: Influenza Immunization not Administered for Documented Reasons. Detail Level: Detailed No

## 2022-11-30 NOTE — ASU PREOP CHECKLIST - LATEX ALLERGY
Thelma Haley  80 y.o. female    1. Essential hypertension    2. Atherosclerosis of native coronary artery of native heart without angina pectoris    3. Mixed hyperlipidemia        History of Present Illness:  Mrs. Haley is a 80-year-old  lady with hypertension, hyperlipidemia, coronary atherosclerosis and has history of fibromyalgia. She has been evaluated for chest pain in the past by both nuclear stress testing and CT angiogram of the coronary arteries (2015).  She is known to have less than 50% stenosis in the Left Anterior Descending Coronary Artery.    She denies any chest pain or shortness of breath but did complain of occasional dizziness but no adri syncopal episodes.  She has been informed about mild stenosis of carotid artery in the remote past by .     Echocardiogram on 11/2/2020 showed normal LV systolic function with an EF of 68% with grade 1A diastolic dysfunction.  Right ventricle was borderline dilated.  Mild mitral valve regurgitation and mild tricuspid valve regurgitation was present.    EKG today showed sinus rhythm with heart rate 70 bpm.  Left anterior fascicular block.  Poor R wave progression anterior leads.  Most likely due to lead location.    She indicates that the swelling in the lower extremities are much improved.       Allergies   Allergen Reactions   • Ciprofloxacin Nausea And Vomiting   • Lortab [Hydrocodone-Acetaminophen] Nausea And Vomiting   • Metronidazole Nausea And Vomiting   • Oxycodone Nausea And Vomiting     Dizziness and doesn't decrease pain   • Ultram [Tramadol Hcl] Nausea And Vomiting         Past Medical History:   Diagnosis Date   • Allergic rhinitis due to pollen    • Arthritis    • Asthma      uncomplicated      • Cataract    • Chronic rhinitis    • Diabetes mellitus (HCC)    • Disease of thyroid gland    • Essential hypertension    • Extrinsic asthma with status asthmaticus    • Glaucoma    • Hyperlipidemia    • Impingement syndrome of right  shoulder    • Injury of back     L4 L5 bulging disc   • Neuropathy    • Nuclear senile cataract    • Overweight with body mass index (BMI) 25.0-29.9    • Primary fibromyalgia syndrome    • Primary open angle glaucoma    • Rosacea    • Temporomandibular joint disorder          Past Surgical History:   Procedure Laterality Date   • BACK SURGERY     • CARPAL TUNNEL RELEASE      Bilateral carpal tunnel release.)   03/27/2000    • CARPAL TUNNEL RELEASE Bilateral    • CATARACT EXTRACTION      Bilateral   • CERVICAL FUSION  1980   • COLONOSCOPY     • D & C HYSTEROSCOPY     • OOPHORECTOMY     • RIGHT OOPHORECTOMY     • ROTATOR CUFF REPAIR      Right   • SHOULDER ARTHROSCOPY Right 02/06/2017    Procedure: RIGHT SHOULDER ARTHROSCOPY SUBACROMIAL DECOMPRESSION, ROTATOR CUFF REPAIR   ;  Surgeon: Terrence Haines MD;  Location: [facility];  Service:    • SHOULDER SURGERY      Excision of 4.8 cm mass with primary intermediate closure.)   03/30/2012    • SPINAL FUSION      L4 L5   • TOTAL KNEE ARTHROPLASTY Right 11/13/2019    Procedure: RIGHT TOTAL KNEE ARTHROPLASTY  with adductor canal block;  Surgeon: Terrence Haines MD;  Location: [facility];  Service: Orthopedics         Family History   Problem Relation Age of Onset   • Hypertension Mother    • COPD Mother    • Hypertension Sister    • Diabetes Maternal Aunt    • Heart disease Other    • Hypertension Other    • Diabetes Half-Brother          Social History     Socioeconomic History   • Marital status:    Tobacco Use   • Smoking status: Never   • Smokeless tobacco: Never   Substance and Sexual Activity   • Alcohol use: No   • Drug use: No   • Sexual activity: Defer         Current Outpatient Medications   Medication Sig Dispense Refill   • amLODIPine (NORVASC) 5 MG tablet Take 1 tablet by mouth Daily. 30 tablet 11   • B-D ULTRA-FINE 33 LANCETS misc Use 1 x daily , one touch ultra please 90 each 3   • Biotin 60147 MCG tablet Take 1 tablet by mouth Daily.     •  "cephalexin (Keflex) 500 MG capsule Take 1 capsule by mouth 2 (Two) Times a Day. 14 capsule 0   • cetirizine (zyrTEC) 10 MG tablet Take 10 mg by mouth Daily.     • cholecalciferol (Cholecalciferol) 25 MCG (1000 UT) tablet Take 1,000 Units by mouth Daily.     • dorzolamide-timolol (COSOPT) 22.3-6.8 MG/ML ophthalmic solution Administer 1 drop to both eyes 2 (Two) Times a Day.     • empagliflozin (JARDIANCE) 25 MG tablet tablet Take 12.5 mg by mouth Daily.     • Ferrous Sulfate (IRON) 325 (65 Fe) MG tablet Take 1 tablet by mouth Daily.     • gabapentin (Neurontin) 300 MG capsule Take 1 capsule by mouth 3 (Three) Times a Day. 90 capsule 5   • lisinopril (PRINIVIL,ZESTRIL) 30 MG tablet TAKE 1 TABLET BY MOUTH EVERY DAY 90 tablet 3   • lovastatin (MEVACOR) 20 MG tablet One tablet po qhs 90 tablet 3   • metFORMIN ER (GLUCOPHAGE-XR) 500 MG 24 hr tablet TAKE 1 TABLET BY MOUTH TWICE A DAY WITH MEALS 180 tablet 3   • omeprazole (priLOSEC) 20 MG capsule TAKE 1 CAPSULE BY MOUTH EVERY DAY 90 capsule 3   • OneTouch Ultra test strip USE TO TEST BLOOD SUGAR TWICE A DAY. ICD E11.9 100 each 5   • pentoxifylline (TRENtal) 400 MG CR tablet TAKE 1 TABLET BY MOUTH TWICE A DAY WITH MEALS 180 tablet 2     No current facility-administered medications for this visit.         OBJECTIVE    /72 (BP Location: Left arm, Patient Position: Sitting, Cuff Size: Adult)   Pulse 70   Temp 97 °F (36.1 °C)   Ht 162.6 cm (64\")   Wt 67.1 kg (148 lb)   LMP 02/06/1980 (Approximate)   SpO2 99%   BMI 25.40 kg/m²         Review of Systems : The following systems were reviewed and changes noted as indicated below    Constitutional:  Denies recent weight loss, weight gain, fever or chills, no change in exercise tolerance     HENT:  Denies any hearing loss, epistaxis, hoarseness, or difficulty speaking.     Eyes: Wears eyeglasses or contact lenses     Respiratory: No dyspnea with exertion,no cough, wheezing, or hemoptysis.     Cardiovascular: Negative for " palpations, chest pain, orthopnea, PND.  Has occasional dizziness.  No syncopal episodes    Gastrointestinal:  Denies change in bowel habits, dyspepsia, ulcer disease, hematochezia, or melena.     Endocrine: Negative for cold intolerance, heat intolerance, polydipsia, polyphagia and polyuria. Diabetes Mellitus     Genitourinary: Negative.      Musculoskeletal: DJD, Fibromyalgia.  Has bilateral pitting leg edema    Neurological:  Denies any history of recurrent headaches, strokes, TIA, or seizure disorder.     Hematological: Denies any food allergies, seasonal allergies, bleeding disorders, or lymphadenopathy.     Psychiatric/Behavioral: Denies any history of depression, substance abuse, or change in cognitive function.     Physical Exam : The following systems were reassessed and changes noted as indicated below    Constitutional: Cooperative, alert and oriented,  in no acute distress.     HENT:   Head: Normocephalic, normal hair patterns, no masses or tenderness.  Ears, Nose, and Throat: No gross abnormalities. No pallor or cyanosis.   Eyes: EOMS intact, PERRL, conjunctivae and lids unremarkable. Fundoscopic exam and visual fields not performed.   Neck: No palpable masses or adenopathy, no thyromegaly, no JVD, carotid pulses are full. ?  Faint left-sided carotid bruit    Cardiovascular: Regular rhythm, S1 and S2 normal, no S3 or S4.  1-2/6 systolic murmur, no gallops, or rubs detected.     Pulmonary/Chest: Chest: normal symmetry,  normal respiratory excursion, no intercostal retraction, no use of accessory muscles.            Pulmonary: Normal breath sounds. No rales or ronchi.    Abdominal: Abdomen soft, bowel sounds normoactive, no masses, no hepatosplenomegaly, non-tender, no bruits.     Musculoskeletal: No deformities, clubbing, cyanosis, erythema, or edema observed.  Reticular veins noted in upper part of the legs on both sides.     Neurological: No gross motor or sensory deficits noted, affect appropriate,  oriented to time, person, place.     Skin: Warm and dry to the touch, no apparent skin lesions or masses noted.     Psychiatric: She has a normal mood and affect. Her behavior is normal. Judgment and thought content normal.         Procedures      Lab Results   Component Value Date    WBC 8.39 07/13/2022    HGB 11.7 (L) 07/13/2022    HCT 34.6 07/13/2022    MCV 87.4 07/13/2022     07/13/2022     Lab Results   Component Value Date    GLUCOSE 109 (H) 07/13/2022    BUN 34 (H) 07/13/2022    CREATININE 1.20 (H) 07/13/2022    EGFRIFNONA 46 (L) 12/22/2021    BCR 28.3 (H) 07/13/2022    CO2 21.0 (L) 07/13/2022    CALCIUM 9.7 07/13/2022    ALBUMIN 4.70 07/13/2022    AST 15 07/13/2022    ALT 9 07/13/2022     Lab Results   Component Value Date    CHOL 164 07/13/2022    CHOL 138 12/22/2021    CHOL 158 06/29/2021     Lab Results   Component Value Date    TRIG 141 07/13/2022    TRIG 140 12/22/2021    TRIG 162 (H) 06/29/2021     Lab Results   Component Value Date    HDL 46 07/13/2022    HDL 42 12/22/2021    HDL 46 06/29/2021     No components found for: LDLCALC  Lab Results   Component Value Date    LDL 93 07/13/2022    LDL 71 12/22/2021    LDL 84 06/29/2021     No results found for: HDLLDLRATIO  No components found for: CHOLHDL  Lab Results   Component Value Date    HGBA1C 6.00 (H) 07/13/2022     Lab Results   Component Value Date    TSH 1.890 07/13/2022    THYROIDAB <6 02/18/2019           ASSESSMENT AND PLAN  Mrs. Haley is stable with regards to her heart with no clinical evidence of angina or congestive heart failure.  The exact etiology for dizziness is unclear and make sure that she does not have any carotid artery disease, carotid ultrasound has been ordered.  A faint left-sided carotid bruit was suspected.    I have continued her current antihypertensive therapy with amlodipine, lisinopril and lipid-lowering therapy with lovastatin has been continued.  She has been advised to maintain a high fluid intake.  She follows  up with Dr. Romeo Duncan on a regular basis.    Diagnoses and all orders for this visit:    1. Essential hypertension (Primary)  -     ECG 12 Lead    2. Atherosclerosis of native coronary artery of native heart without angina pectoris    3. Mixed hyperlipidemia        Patient's Body mass index is 25.4 kg/m².  BMI within normal limits  Patient is a non-smoker.    Manju Ramirez MD  11/30/2022  10:36 CST   no

## 2023-01-25 NOTE — PROGRESS NOTE ADULT - SUBJECTIVE AND OBJECTIVE BOX
Patient is a 83y old  Male who presents with a chief complaint of chest pain (29 May 2019 06:43)      SUBJECTIVE / OVERNIGHT EVENTS: Patient seen and examined at bedside. He denies any further episodes of CP. Denies SOB, abd pain and n/v. He is aware that he is bernardino for C today, and HD post cath.     ROS:  All other review of systems negative    Allergies    No Known Allergies    Intolerances        MEDICATIONS  (STANDING):  aspirin enteric coated 81 milliGRAM(s) Oral daily  atorvastatin 80 milliGRAM(s) Oral at bedtime  calcium acetate 1334 milliGRAM(s) Oral three times a day with meals  heparin  Infusion.  Unit(s)/Hr (8 mL/Hr) IV Continuous <Continuous>  latanoprost 0.005% Ophthalmic Solution 1 Drop(s) Both EYES at bedtime  metoprolol succinate  milliGRAM(s) Oral daily  pantoprazole    Tablet 40 milliGRAM(s) Oral before breakfast  ticagrelor 90 milliGRAM(s) Oral two times a day    MEDICATIONS  (PRN):  heparin  Injectable 4100 Unit(s) IV Push every 6 hours PRN For aPTT less than 40  zaleplon 5 milliGRAM(s) Oral at bedtime PRN Insomnia      Vital Signs Last 24 Hrs  T(C): 36.6 (29 May 2019 04:50), Max: 36.9 (28 May 2019 19:31)  T(F): 97.9 (29 May 2019 04:50), Max: 98.4 (28 May 2019 19:31)  HR: 69 (29 May 2019 10:36) (62 - 76)  BP: 155/76 (29 May 2019 10:36) (135/60 - 175/75)  BP(mean): --  RR: 18 (29 May 2019 04:50) (18 - 18)  SpO2: 97% (29 May 2019 04:50) (96% - 99%)  CAPILLARY BLOOD GLUCOSE        I&O's Summary    28 May 2019 07:01  -  29 May 2019 07:00  --------------------------------------------------------  IN: 438 mL / OUT: 0 mL / NET: 438 mL        PHYSICAL EXAM:  GENERAL: NAD, well-developed, elderly   HEAD:  Atraumatic, Normocephalic  EYES: EOMI, PERRLA, conjunctiva and sclera clear  NECK: Supple, No JVD  CHEST/LUNG: Clear to auscultation bilaterally; No wheeze  HEART: Regular rate and rhythm; No murmurs, rubs, or gallops  ABDOMEN: Soft, Nontender, Nondistended; Bowel sounds present  EXTREMITIES:  2+ Peripheral Pulses, No clubbing, cyanosis, or edema  NEUROLOGY: AAOx3, non-focal  SKIN: No rashes or lesions    LABS:                        13.1   6.9   )-----------( 176      ( 29 May 2019 05:07 )             36.8     05-29    141  |  98  |  51<H>  ----------------------------<  88  4.4   |  24  |  7.92<H>    Ca    10.1      29 May 2019 05:07  Phos  3.1     05-28  Mg     2.3     05-28    TPro  6.8  /  Alb  4.0  /  TBili  0.5  /  DBili  x   /  AST  14  /  ALT  15  /  AlkPhos  90  05-28    PT/INR - ( 27 May 2019 21:30 )   PT: 11.0 sec;   INR: 0.97 ratio         PTT - ( 29 May 2019 05:07 )  PTT:55.6 sec  CARDIAC MARKERS ( 28 May 2019 05:36 )  x     / x     / 50 U/L / x     / 1.8 ng/mL          RADIOLOGY & ADDITIONAL TESTS:    Consultant(s) Notes Reviewed:  cardiology rec noted     Care Discussed with Consultants/Other Providers: medicine NP     Case Discussed with son at bedside no

## 2023-07-05 NOTE — PATIENT PROFILE ADULT. - TEACHING/LEARNING LEARNING PREFERENCES
verbal instruction Rifampin Counseling: I discussed with the patient the risks of rifampin including but not limited to liver damage, kidney damage, red-orange body fluids, nausea/vomiting and severe allergy.

## 2023-09-14 NOTE — PROGRESS NOTE ADULT - PROBLEM/PLAN-4
DISPLAY PLAN FREE TEXT
Partial Purse String (Simple) Text: Given the location of the defect and the characteristics of the surrounding skin a simple purse string closure was deemed most appropriate.  Undermining was performed circumferentially around the surgical defect.  A purse string suture was then placed and tightened. Wound tension only allowed a partial closure of the circular defect.

## 2024-03-12 NOTE — H&P ADULT - PROBLEM SELECTOR PLAN 2
I have personally seen and examined the patient. I have collaborated with and supervised the HD  Nephrology consult

## 2024-07-01 ENCOUNTER — TRANSCRIPTION ENCOUNTER (OUTPATIENT)
Age: 89
End: 2024-07-01

## 2024-07-03 ENCOUNTER — NON-APPOINTMENT (OUTPATIENT)
Age: 89
End: 2024-07-03

## 2024-07-03 ENCOUNTER — APPOINTMENT (OUTPATIENT)
Dept: CARDIOLOGY | Facility: CLINIC | Age: 89
End: 2024-07-03

## 2024-07-03 VITALS
WEIGHT: 137 LBS | BODY MASS INDEX: 24.27 KG/M2 | OXYGEN SATURATION: 100 % | DIASTOLIC BLOOD PRESSURE: 70 MMHG | HEIGHT: 63 IN | HEART RATE: 65 BPM | SYSTOLIC BLOOD PRESSURE: 183 MMHG

## 2024-07-03 PROCEDURE — 99205 OFFICE O/P NEW HI 60 MIN: CPT

## 2024-07-03 PROCEDURE — G2211 COMPLEX E/M VISIT ADD ON: CPT

## 2024-07-03 PROCEDURE — 93000 ELECTROCARDIOGRAM COMPLETE: CPT

## 2024-07-03 RX ORDER — FERRIC CITRATE 210 MG/1
1 GM TABLET, COATED ORAL
Refills: 0 | Status: ACTIVE | COMMUNITY
Start: 2024-07-03

## 2024-07-03 RX ORDER — METOPROLOL TARTRATE 25 MG/1
25 TABLET, FILM COATED ORAL
Qty: 20 | Refills: 0 | Status: COMPLETED | COMMUNITY
Start: 2024-07-03 | End: 2024-07-03

## 2024-07-03 RX ORDER — PANTOPRAZOLE 40 MG/1
40 TABLET, DELAYED RELEASE ORAL
Qty: 15 | Refills: 0 | Status: ACTIVE | COMMUNITY
Start: 2024-07-03

## 2024-07-03 RX ORDER — ATORVASTATIN CALCIUM 80 MG/1
80 TABLET, FILM COATED ORAL
Qty: 90 | Refills: 0 | Status: ACTIVE | COMMUNITY
Start: 2024-07-03

## 2024-07-03 RX ORDER — CARVEDILOL 6.25 MG/1
6.25 TABLET, FILM COATED ORAL TWICE DAILY
Qty: 180 | Refills: 3 | Status: ACTIVE | COMMUNITY
Start: 2024-07-03 | End: 1900-01-01

## 2025-05-28 NOTE — H&P CARDIOLOGY - HEIGHT IN FEET
Have you been to the ER, urgent care clinic since your last visit?  Hospitalized since your last visit?   N/A    Have you seen or consulted any other health care providers outside our system since your last visit?   N/A    Have you had a mammogram?”   N/A    No breast cancer screening on file       “Have you had a colorectal cancer screening such as a colonoscopy/FIT/Cologuard?    N/A    No colonoscopy on file  No cologuard on file  No FIT/FOBT on file   No flexible sigmoidoscopy on file           experienced.     Please note that this dictation was completed with [employer], the computer voice recognition software. Quite often unanticipated grammatical, syntax, homophones, and other interpretive errors are inadvertently transcribed by the computer software. Please disregard these errors. Please excuse any errors that have escaped final proofreading.    An electronic signature was used to authenticate this note.          --Ondina Rush, APRN - NP on 5/28/2025 at 10:55 AM            Medicare Annual Wellness Visit    Ashley PELAEZ Vicky is here for Medicare AWV    Assessment & Plan      Osteoporosis without current pathological fracture, unspecified osteoporosis type  -     DEXA BONE DENSITY AXIAL SKELETON; Future  Current smoker  -     DEXA BONE DENSITY AXIAL SKELETON; Future  Gastroesophageal reflux disease without esophagitis  -     pantoprazole (PROTONIX) 40 MG tablet; Take 1 tablet by mouth daily, Disp-90 tablet, R-1Normal  Initial Medicare annual wellness visit  Allergic rhinitis, unspecified seasonality, unspecified trigger  -     fluticasone (FLONASE) 50 MCG/ACT nasal spray; 2 sprays by Nasal route daily as needed for Rhinitis or Allergies, Disp-16 g, R-3Normal    Results         Return in 6 months (on 11/28/2025) for [id number], HPL.     Subjective     History of Present Illness        Patient's complete Health Risk Assessment and screening values have been reviewed and are found in Flowsheets. The following problems were reviewed today and where indicated follow up appointments were made and/or referrals ordered.    Positive Risk Factor Screenings with Interventions:              Inactivity:  On average, how many days per week do you engage in moderate to strenuous exercise (like a brisk walk)?: 0 days (!) Abnormal  On average, how many minutes do you engage in exercise at this level?: 0 min  Interventions:  See AVS for additional education material    Poor Eating Habits/Diet:  Do you eat  5